# Patient Record
Sex: FEMALE | Race: WHITE | Employment: PART TIME | URBAN - METROPOLITAN AREA
[De-identification: names, ages, dates, MRNs, and addresses within clinical notes are randomized per-mention and may not be internally consistent; named-entity substitution may affect disease eponyms.]

---
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
## 2018-01-03 ENCOUNTER — OFFICE VISIT (OUTPATIENT)
Dept: PERINATAL CARE | Facility: HOSPITAL | Age: 18
End: 2018-01-03
Attending: OBSTETRICS & GYNECOLOGY
Payer: COMMERCIAL

## 2018-01-03 VITALS
WEIGHT: 287 LBS | SYSTOLIC BLOOD PRESSURE: 105 MMHG | HEART RATE: 79 BPM | BODY MASS INDEX: 42 KG/M2 | DIASTOLIC BLOOD PRESSURE: 76 MMHG

## 2018-01-03 DIAGNOSIS — O99.119 THROMBOCYTOPENIA AFFECTING PREGNANCY (HCC): ICD-10-CM

## 2018-01-03 DIAGNOSIS — D69.6 THROMBOCYTOPENIA AFFECTING PREGNANCY (HCC): ICD-10-CM

## 2018-01-03 DIAGNOSIS — Z86.14 HISTORY OF MRSA INFECTION: ICD-10-CM

## 2018-01-03 DIAGNOSIS — E66.9 BMI (BODY MASS INDEX), PEDIATRIC 95-99% FOR AGE, OBESE CHILD STRUCTURED WEIGHT MANAGEMENT/MULTIDISCIPLINARY INTERVENTION CATEGORY: ICD-10-CM

## 2018-01-03 DIAGNOSIS — O09.891 MEDICATION EXPOSURE DURING FIRST TRIMESTER OF PREGNANCY: ICD-10-CM

## 2018-01-03 PROCEDURE — 99214 OFFICE O/P EST MOD 30 MIN: CPT | Performed by: OBSTETRICS & GYNECOLOGY

## 2018-01-03 PROCEDURE — 76805 OB US >/= 14 WKS SNGL FETUS: CPT | Performed by: OBSTETRICS & GYNECOLOGY

## 2018-01-03 NOTE — PROGRESS NOTES
Indication: follow-up for fetal growth.   ____________________________________________________________________________  History: Age: 21 years. : 1 Para: 0.  Last menstrual period: 2017.  ______________________________________________________ range of 150,000 to 450,000/microL in women during normal pregnancies. Idiopathic thrombocytopenia(ITP) is the more likely diagnosis if thrombocytopenia occurs early during pregnancy or if the platelet count is very low (ie, <50,000/microL).      Thromboc single daily dose. Most adults with ITP respond to prednisone treatment within two weeks, with the majority responding within the first week. The duration of initial prednisone treatment is determined by the platelet count response.  If the platelet count r series suggests that there is an approximately 10 and 5 percent risk that the  infant will have a platelet count of <52,890 or <20,000/microL, respectively.      It is important to emphasize that platelet counts of infants born to mothers with ITP ma

## 2018-01-29 NOTE — PROGRESS NOTES
Car Ortega    Dear Dr. Isi Lovell    Thank you for requesting ultrasound evaluation and maternal fetal medicine consultation on your patient Angella Cochran.   As you are aware she is a 21year old female  with a Anatomy:  Visualized with normal appearance: head, chest, kidneys, bladder. Spine: appears normal but suboptimal  Heart: Visualized and normal appearance: four-chamber view. Gastrointestinal Tract: stomach visible.     Summary of Ultrasound Findings:  I

## 2018-01-30 ENCOUNTER — OFFICE VISIT (OUTPATIENT)
Dept: PERINATAL CARE | Facility: HOSPITAL | Age: 18
End: 2018-01-30
Attending: OBSTETRICS & GYNECOLOGY
Payer: COMMERCIAL

## 2018-01-30 VITALS
HEART RATE: 94 BPM | WEIGHT: 281 LBS | BODY MASS INDEX: 42 KG/M2 | SYSTOLIC BLOOD PRESSURE: 116 MMHG | DIASTOLIC BLOOD PRESSURE: 66 MMHG

## 2018-01-30 DIAGNOSIS — O99.213 OBESITY AFFECTING PREGNANCY IN THIRD TRIMESTER: ICD-10-CM

## 2018-01-30 DIAGNOSIS — O99.119 THROMBOCYTOPENIA AFFECTING PREGNANCY (HCC): ICD-10-CM

## 2018-01-30 DIAGNOSIS — D69.6 THROMBOCYTOPENIA AFFECTING PREGNANCY (HCC): ICD-10-CM

## 2018-01-30 PROCEDURE — 76819 FETAL BIOPHYS PROFIL W/O NST: CPT | Performed by: OBSTETRICS & GYNECOLOGY

## 2018-01-30 PROCEDURE — 99214 OFFICE O/P EST MOD 30 MIN: CPT | Performed by: OBSTETRICS & GYNECOLOGY

## 2018-01-30 PROCEDURE — 76805 OB US >/= 14 WKS SNGL FETUS: CPT | Performed by: OBSTETRICS & GYNECOLOGY

## 2018-02-05 ENCOUNTER — HOSPITAL ENCOUNTER (OUTPATIENT)
Facility: HOSPITAL | Age: 18
Setting detail: OBSERVATION
Discharge: HOME | End: 2018-02-05
Attending: OBSTETRICS & GYNECOLOGY | Admitting: OBSTETRICS & GYNECOLOGY
Payer: COMMERCIAL

## 2018-02-05 VITALS
HEART RATE: 88 BPM | TEMPERATURE: 98 F | RESPIRATION RATE: 20 BRPM | DIASTOLIC BLOOD PRESSURE: 77 MMHG | SYSTOLIC BLOOD PRESSURE: 136 MMHG

## 2018-02-05 LAB
BLOOD URINE: NEGATIVE
CONTROL RUN WITHIN 24 HOURS?: YES
GLUCOSE URINE: NEGATIVE
NITRITE URINE: NEGATIVE
PH URINE: 7 (ref 5–8)
PROTEIN URINE: 100
SPEC GRAVITY: 1.03 (ref 1–1.03)
URINE CLARITY: CLEAR
UROBILINOGEN URINE: 8

## 2018-02-05 PROCEDURE — 59025 FETAL NON-STRESS TEST: CPT

## 2018-02-05 PROCEDURE — 81002 URINALYSIS NONAUTO W/O SCOPE: CPT

## 2018-02-05 PROCEDURE — 84112 EVAL AMNIOTIC FLUID PROTEIN: CPT

## 2018-02-05 NOTE — PROGRESS NOTES
Pt is a 21year old female admitted to [facility]. Patient presents with:  Rupture Of Membranes     Pt is  35w0d intra-uterine pregnancy. History obtained, consents signed. Oriented to room, staff, and plan of care.     Pt states that approx 0930

## 2018-02-06 NOTE — NST
Nonstress Test   Patient: Clotilde Ribeiro    Gestation: 35w0d    NST:       Variability: Moderate           Accelerations: Yes           Decelerations: None            Baseline: 140 BPM           Uterine Irritability: No           Contractions: Not pr

## 2018-02-14 PROCEDURE — 87653 STREP B DNA AMP PROBE: CPT | Performed by: OBSTETRICS & GYNECOLOGY

## 2018-02-14 PROCEDURE — 87081 CULTURE SCREEN ONLY: CPT | Performed by: OBSTETRICS & GYNECOLOGY

## 2018-02-27 ENCOUNTER — HOSPITAL ENCOUNTER (INPATIENT)
Facility: HOSPITAL | Age: 18
LOS: 4 days | Discharge: HOME | End: 2018-03-03
Attending: OBSTETRICS & GYNECOLOGY | Admitting: OBSTETRICS & GYNECOLOGY
Payer: COMMERCIAL

## 2018-02-27 LAB
ANTIBODY SCREEN: NEGATIVE
BILIRUBIN URINE: NEGATIVE
BLOOD URINE: NEGATIVE
CONTROL RUN WITHIN 24 HOURS?: YES
ERYTHROCYTE [DISTWIDTH] IN BLOOD BY AUTOMATED COUNT: 12.8 % (ref 11.5–16)
GLUCOSE URINE: NEGATIVE
HCT VFR BLD AUTO: 37.8 % (ref 34–50)
HGB BLD-MCNC: 13.1 G/DL (ref 12–16)
LEUKOCYTE ESTERASE URINE: NEGATIVE
MCH RBC QN AUTO: 31.8 PG (ref 27–33.2)
MCHC RBC AUTO-ENTMCNC: 34.7 G/DL (ref 31–37)
MCV RBC AUTO: 91.7 FL (ref 81–100)
NITRITE URINE: NEGATIVE
PH URINE: 7 (ref 5–8)
PLATELET # BLD AUTO: 119 10(3)UL (ref 150–450)
PROTEIN URINE: NEGATIVE
RBC # BLD AUTO: 4.12 X10(6)UL (ref 3.8–5.1)
RED CELL DISTRIBUTION WIDTH-SD: 42.2 FL (ref 35.1–46.3)
RH BLOOD TYPE: POSITIVE
SPEC GRAVITY: 1.02 (ref 1–1.03)
T PALLIDUM AB SER QL IA: NONREACTIVE
URINE CLARITY: CLEAR
URINE COLOR: YELLOW
UROBILINOGEN URINE: 0.2
WBC # BLD AUTO: 9.5 X10(3) UL (ref 4–13)

## 2018-02-27 PROCEDURE — 84112 EVAL AMNIOTIC FLUID PROTEIN: CPT

## 2018-02-27 PROCEDURE — 86901 BLOOD TYPING SEROLOGIC RH(D): CPT | Performed by: OBSTETRICS & GYNECOLOGY

## 2018-02-27 PROCEDURE — 86850 RBC ANTIBODY SCREEN: CPT | Performed by: OBSTETRICS & GYNECOLOGY

## 2018-02-27 PROCEDURE — 86780 TREPONEMA PALLIDUM: CPT | Performed by: OBSTETRICS & GYNECOLOGY

## 2018-02-27 PROCEDURE — 81002 URINALYSIS NONAUTO W/O SCOPE: CPT

## 2018-02-27 PROCEDURE — 85027 COMPLETE CBC AUTOMATED: CPT | Performed by: OBSTETRICS & GYNECOLOGY

## 2018-02-27 PROCEDURE — 86900 BLOOD TYPING SEROLOGIC ABO: CPT | Performed by: OBSTETRICS & GYNECOLOGY

## 2018-02-27 RX ORDER — DEXTROSE, SODIUM CHLORIDE, SODIUM LACTATE, POTASSIUM CHLORIDE, AND CALCIUM CHLORIDE 5; .6; .31; .03; .02 G/100ML; G/100ML; G/100ML; G/100ML; G/100ML
INJECTION, SOLUTION INTRAVENOUS AS NEEDED
Status: DISCONTINUED | OUTPATIENT
Start: 2018-02-27 | End: 2018-02-28 | Stop reason: HOSPADM

## 2018-02-27 RX ORDER — IBUPROFEN 600 MG/1
600 TABLET ORAL ONCE AS NEEDED
Status: DISCONTINUED | OUTPATIENT
Start: 2018-02-27 | End: 2018-02-28 | Stop reason: HOSPADM

## 2018-02-27 RX ORDER — TRISODIUM CITRATE DIHYDRATE AND CITRIC ACID MONOHYDRATE 500; 334 MG/5ML; MG/5ML
30 SOLUTION ORAL AS NEEDED
Status: COMPLETED | OUTPATIENT
Start: 2018-02-27 | End: 2018-02-28

## 2018-02-27 RX ORDER — TERBUTALINE SULFATE 1 MG/ML
0.25 INJECTION, SOLUTION SUBCUTANEOUS AS NEEDED
Status: DISCONTINUED | OUTPATIENT
Start: 2018-02-27 | End: 2018-02-28 | Stop reason: HOSPADM

## 2018-02-27 RX ORDER — SODIUM CHLORIDE, SODIUM LACTATE, POTASSIUM CHLORIDE, CALCIUM CHLORIDE 600; 310; 30; 20 MG/100ML; MG/100ML; MG/100ML; MG/100ML
INJECTION, SOLUTION INTRAVENOUS CONTINUOUS
Status: DISCONTINUED | OUTPATIENT
Start: 2018-02-27 | End: 2018-02-28 | Stop reason: HOSPADM

## 2018-02-28 ENCOUNTER — ANESTHESIA (OUTPATIENT)
Dept: OBGYN UNIT | Facility: HOSPITAL | Age: 18
End: 2018-02-28
Payer: COMMERCIAL

## 2018-02-28 ENCOUNTER — ANESTHESIA EVENT (OUTPATIENT)
Dept: OBGYN UNIT | Facility: HOSPITAL | Age: 18
End: 2018-02-28
Payer: COMMERCIAL

## 2018-02-28 ENCOUNTER — SURGERY (OUTPATIENT)
Age: 18
End: 2018-02-28

## 2018-02-28 RX ORDER — METHYLERGONOVINE MALEATE 0.2 MG/ML
INJECTION INTRAVENOUS
Status: DISCONTINUED
Start: 2018-02-28 | End: 2018-02-28 | Stop reason: WASHOUT

## 2018-02-28 RX ORDER — CARBOPROST TROMETHAMINE 250 UG/ML
INJECTION, SOLUTION INTRAMUSCULAR
Status: DISCONTINUED
Start: 2018-02-28 | End: 2018-02-28 | Stop reason: WASHOUT

## 2018-02-28 RX ORDER — ONDANSETRON 2 MG/ML
4 INJECTION INTRAMUSCULAR; INTRAVENOUS EVERY 6 HOURS PRN
Status: DISCONTINUED | OUTPATIENT
Start: 2018-02-28 | End: 2018-03-01 | Stop reason: HOSPADM

## 2018-02-28 RX ORDER — EPHEDRINE SULFATE 50 MG/ML
5 INJECTION, SOLUTION INTRAVENOUS AS NEEDED
Status: DISCONTINUED | OUTPATIENT
Start: 2018-02-28 | End: 2018-03-01 | Stop reason: HOSPADM

## 2018-02-28 RX ORDER — DIPHENHYDRAMINE HYDROCHLORIDE 50 MG/ML
25 INJECTION INTRAMUSCULAR; INTRAVENOUS ONCE AS NEEDED
Status: DISCONTINUED | OUTPATIENT
Start: 2018-02-28 | End: 2018-02-28 | Stop reason: HOSPADM

## 2018-02-28 RX ORDER — NALBUPHINE HCL 10 MG/ML
2.5 AMPUL (ML) INJECTION
Status: DISCONTINUED | OUTPATIENT
Start: 2018-02-28 | End: 2018-02-28 | Stop reason: HOSPADM

## 2018-02-28 RX ORDER — NALBUPHINE HCL 10 MG/ML
2.5 AMPUL (ML) INJECTION
Status: DISCONTINUED | OUTPATIENT
Start: 2018-02-28 | End: 2018-03-03

## 2018-02-28 RX ORDER — KETOROLAC TROMETHAMINE 30 MG/ML
30 INJECTION, SOLUTION INTRAMUSCULAR; INTRAVENOUS ONCE AS NEEDED
Status: COMPLETED | OUTPATIENT
Start: 2018-02-28 | End: 2018-02-28

## 2018-02-28 RX ORDER — ONDANSETRON 2 MG/ML
4 INJECTION INTRAMUSCULAR; INTRAVENOUS ONCE AS NEEDED
Status: DISCONTINUED | OUTPATIENT
Start: 2018-02-28 | End: 2018-02-28 | Stop reason: HOSPADM

## 2018-02-28 RX ORDER — KETOROLAC TROMETHAMINE 30 MG/ML
INJECTION, SOLUTION INTRAMUSCULAR; INTRAVENOUS
Status: DISPENSED
Start: 2018-02-28 | End: 2018-03-01

## 2018-02-28 NOTE — PROGRESS NOTES
S: pt states she is feeling contractions but not severe  O:  Temp:  [97.6 °F (36.4 °C)-98.9 °F (37.2 °C)] 97.8 °F (36.6 °C)  Pulse:  [] 77  Resp:  [18-22] 18  BP: (101-119)/(56-84) 116/84   SVE- 3/80%/-2 IUPC placed  FHT-140, mod aby, +accells, no de

## 2018-02-28 NOTE — PROGRESS NOTES
Report on pt received from [address] [place] [address], pt care taken over at this time.

## 2018-02-28 NOTE — PLAN OF CARE
Problem: Patient/Family Goals  Goal: Patient/Family Long Term Goal  Patient's Long Term Goal: UNCOMPLICATED VAGINAL DELIVERY    Interventions:  -   - See additional Care Plan goals for specific interventions   Outcome: Progressing    Goal: Patient/Family S

## 2018-02-28 NOTE — PROGRESS NOTES
[facility]    Labor Progress Note    Naheed Figueroa Patient Status:  Inpatient    2000 MRN [id number]   Location [facility] Attending Maranda Good MD   Hosp Day # 1 PCP Rohit Massey MD       Subjective

## 2018-02-28 NOTE — H&P
[address] and Delivery Prenatal History and Physical Interval Addendum  Please see full Prenatal Record for this pregnancy      SUBJECTIVE:    Interval History:      This is a pregnancy at 38 weeks admitted for SROM at 2:30 PM today  Pregnancy

## 2018-02-28 NOTE — PROGRESS NOTES
WITH EDC 2018 ( 38 1/7 WEEKS) PRESENTS TO L&D WITH C/O SROM  PM. PT. DENIES PROBLEMS WITH PREGNANCY, GROSS VAGINAL BLEEDING OR REGULAR CTX'S. EFM AND TOCO PLACED. FHT'S REACTIVE. CTX'S IRREGULAR.  WILL UPDATE DR. Libby Rojo OF THE ABOVE ASSESSM

## 2018-03-01 LAB
BASOPHILS # BLD AUTO: 0.01 X10(3) UL (ref 0–0.1)
BASOPHILS NFR BLD AUTO: 0.1 %
EOSINOPHIL # BLD AUTO: 0.01 X10(3) UL (ref 0–0.3)
EOSINOPHIL NFR BLD AUTO: 0.1 %
ERYTHROCYTE [DISTWIDTH] IN BLOOD BY AUTOMATED COUNT: 12.8 % (ref 11.5–16)
HCT VFR BLD AUTO: 29.3 % (ref 34–50)
HGB BLD-MCNC: 10 G/DL (ref 12–16)
IMMATURE GRANULOCYTE COUNT: 0.05 X10(3) UL (ref 0–1)
IMMATURE GRANULOCYTE RATIO %: 0.4 %
LYMPHOCYTES # BLD AUTO: 1.34 X10(3) UL (ref 0.9–4)
LYMPHOCYTES NFR BLD AUTO: 11.7 %
MCH RBC QN AUTO: 31.3 PG (ref 27–33.2)
MCHC RBC AUTO-ENTMCNC: 34.1 G/DL (ref 31–37)
MCV RBC AUTO: 91.6 FL (ref 81–100)
MONOCYTES # BLD AUTO: 1.07 X10(3) UL (ref 0.1–1)
MONOCYTES NFR BLD AUTO: 9.4 %
NEUTROPHIL ABS PRELIM: 8.93 X10 (3) UL (ref 1.3–6.7)
NEUTROPHILS # BLD AUTO: 8.93 X10(3) UL (ref 1.3–6.7)
NEUTROPHILS NFR BLD AUTO: 78.3 %
PLATELET # BLD AUTO: 103 10(3)UL (ref 150–450)
RBC # BLD AUTO: 3.2 X10(6)UL (ref 3.8–5.1)
RED CELL DISTRIBUTION WIDTH-SD: 42.2 FL (ref 35.1–46.3)
WBC # BLD AUTO: 11.4 X10(3) UL (ref 4–13)

## 2018-03-01 PROCEDURE — 85025 COMPLETE CBC W/AUTO DIFF WBC: CPT | Performed by: OBSTETRICS & GYNECOLOGY

## 2018-03-01 RX ORDER — DEXTROSE, SODIUM CHLORIDE, SODIUM LACTATE, POTASSIUM CHLORIDE, AND CALCIUM CHLORIDE 5; .6; .31; .03; .02 G/100ML; G/100ML; G/100ML; G/100ML; G/100ML
INJECTION, SOLUTION INTRAVENOUS CONTINUOUS
Status: DISCONTINUED | OUTPATIENT
Start: 2018-03-01 | End: 2018-03-03

## 2018-03-01 RX ORDER — DOCUSATE SODIUM 100 MG/1
100 CAPSULE, LIQUID FILLED ORAL
Status: DISCONTINUED | OUTPATIENT
Start: 2018-03-01 | End: 2018-03-03

## 2018-03-01 RX ORDER — BISACODYL 10 MG
10 SUPPOSITORY, RECTAL RECTAL
Status: DISCONTINUED | OUTPATIENT
Start: 2018-03-01 | End: 2018-03-03

## 2018-03-01 RX ORDER — ONDANSETRON 2 MG/ML
4 INJECTION INTRAMUSCULAR; INTRAVENOUS EVERY 6 HOURS PRN
Status: DISCONTINUED | OUTPATIENT
Start: 2018-03-01 | End: 2018-03-03

## 2018-03-01 RX ORDER — SIMETHICONE 80 MG
80 TABLET,CHEWABLE ORAL 3 TIMES DAILY PRN
Status: DISCONTINUED | OUTPATIENT
Start: 2018-03-01 | End: 2018-03-03

## 2018-03-01 RX ORDER — KETOROLAC TROMETHAMINE 30 MG/ML
30 INJECTION, SOLUTION INTRAMUSCULAR; INTRAVENOUS EVERY 6 HOURS PRN
Status: DISPENSED | OUTPATIENT
Start: 2018-03-01 | End: 2018-03-03

## 2018-03-01 RX ORDER — IBUPROFEN 600 MG/1
600 TABLET ORAL EVERY 6 HOURS SCHEDULED
Status: DISCONTINUED | OUTPATIENT
Start: 2018-03-01 | End: 2018-03-03

## 2018-03-01 RX ORDER — DIPHENHYDRAMINE HYDROCHLORIDE 50 MG/ML
12.5 INJECTION INTRAMUSCULAR; INTRAVENOUS EVERY 4 HOURS PRN
Status: DISCONTINUED | OUTPATIENT
Start: 2018-03-01 | End: 2018-03-03

## 2018-03-01 RX ORDER — METOCLOPRAMIDE HYDROCHLORIDE 5 MG/ML
10 INJECTION INTRAMUSCULAR; INTRAVENOUS EVERY 6 HOURS PRN
Status: DISCONTINUED | OUTPATIENT
Start: 2018-03-01 | End: 2018-03-03

## 2018-03-01 RX ORDER — DIPHENHYDRAMINE HCL 25 MG
25 CAPSULE ORAL EVERY 4 HOURS PRN
Status: DISCONTINUED | OUTPATIENT
Start: 2018-03-01 | End: 2018-03-03

## 2018-03-01 RX ORDER — HYDROCODONE BITARTRATE AND ACETAMINOPHEN 10; 325 MG/1; MG/1
1 TABLET ORAL EVERY 4 HOURS PRN
Status: DISCONTINUED | OUTPATIENT
Start: 2018-03-01 | End: 2018-03-03

## 2018-03-01 RX ORDER — SODIUM CHLORIDE, SODIUM LACTATE, POTASSIUM CHLORIDE, CALCIUM CHLORIDE 600; 310; 30; 20 MG/100ML; MG/100ML; MG/100ML; MG/100ML
INJECTION, SOLUTION INTRAVENOUS CONTINUOUS
Status: DISCONTINUED | OUTPATIENT
Start: 2018-03-01 | End: 2018-03-03

## 2018-03-01 RX ORDER — HYDROCODONE BITARTRATE AND ACETAMINOPHEN 5; 325 MG/1; MG/1
1 TABLET ORAL EVERY 4 HOURS PRN
Status: DISCONTINUED | OUTPATIENT
Start: 2018-03-01 | End: 2018-03-03

## 2018-03-01 RX ORDER — NALOXONE HYDROCHLORIDE 0.4 MG/ML
0.08 INJECTION, SOLUTION INTRAMUSCULAR; INTRAVENOUS; SUBCUTANEOUS
Status: ACTIVE | OUTPATIENT
Start: 2018-03-01 | End: 2018-03-02

## 2018-03-01 RX ORDER — NALBUPHINE HCL 10 MG/ML
2.5 AMPUL (ML) INJECTION EVERY 4 HOURS PRN
Status: DISCONTINUED | OUTPATIENT
Start: 2018-03-01 | End: 2018-03-03

## 2018-03-01 RX ORDER — ZOLPIDEM TARTRATE 5 MG/1
5 TABLET ORAL NIGHTLY PRN
Status: DISCONTINUED | OUTPATIENT
Start: 2018-03-01 | End: 2018-03-03

## 2018-03-01 NOTE — BRIEF OP NOTE
Pre-Operative Diagnosis: term pregnancy, arrest of dilation     Post-Operative Diagnosis:same + persistent occiput posterior     Procedure Performed: primary low transverse  section  Procedure(s):      Surgeon(s) and Role:     Rajani Villarreal,

## 2018-03-01 NOTE — PROGRESS NOTES
Patient transferred to mother/baby room 2208 per cart in stable condition with baby and personal belongings. Accompanied by family member and staff. Report given to mother/baby RN.

## 2018-03-01 NOTE — ANESTHESIA POSTPROCEDURE EVALUATION
[address]  Patient Status:  Inpatient   Age/Gender 21year old female MRN [id number]   Location [facility] Attending Herrera Clark MD   Hosp Day # 1 PCP Unknown MD Harjinder       Anesthesia Post-op No

## 2018-03-01 NOTE — CM/SW NOTE
CM received order that 21 yr old pt would like infant added to medicaid. CM called [facility] and ask that they follow up with pt to do medicaid for infant. CM will follow up with pt to seeif she has [facility] resources and PCP for infant.

## 2018-03-01 NOTE — CM/SW NOTE
CM met with pt and reviewed insurance and PCP for infant. [facility] met with pt and infant will be added to medicaid. PCP will be Dr. Anton Ambrose. Pt has [facility] and already has breast pump. CM asked if pt had any other needs? Pt stated no.  CM left name and

## 2018-03-01 NOTE — ANESTHESIA PREPROCEDURE EVALUATION
PRE-OP EVALUATION    Patient Name: Katty Adams    Pre-op Diagnosis: * No pre-op diagnosis entered *    Procedure(s):      Surgeon(s) and Role:     * Simeon Reid, MD - Primary    Pre-op vitals reviewed.   Temp: 98.5 °F (36.9 °C)  Pulse: 74  Resp: Neuro/Psych        (+) anxiety                            Past Surgical History:  No date: OTHER SURGICAL HISTORY      Comment: myringtomy tubes  No date: OTHER SURGICAL HISTORY      Comment: flat feet  No date: OTHER SURGICAL HISTORY

## 2018-03-01 NOTE — PROGRESS NOTES
Postop Day 1    Pt without complaints. Pain well-controlled. Passing flatus. No NV.     Temp:  [97.8 °F (36.6 °C)-98.6 °F (37 °C)] 98.1 °F (36.7 °C)  Pulse:  [] 82  Resp:  [11-36] 20  BP: ()/() 135/74    Intake/Output Summary (Last 24 hour

## 2018-03-01 NOTE — PROGRESS NOTES
[facility]    Patients Name: Yuval Collado  Attending Physician: Kassidy Borges MD  CSN: [id number]    Location:  5728/1701-J  MRN: [id number]    YOB: 2000  Admission Date: 2/27/2018     Obstetric Anesthesia Pain Management Progrbaudilio

## 2018-03-01 NOTE — PROGRESS NOTES
SUBJECTIVE:   pt without complaints. Comfortable s/p epidural    OBJECTIVE:  VS:  height is 5' 9\" (1.753 m) and weight is 280 lb (127 kg). Her oral temperature is 98.5 °F (36.9 °C). Her blood pressure is 114/72 and her pulse is 74.  Her respiration is 20

## 2018-03-01 NOTE — OPERATIVE REPORT
[facility]    PATIENT'S NAME: Gregory Saucedo   ATTENDING PHYSICIAN: Brady Velasco M.D. OPERATING PHYSICIAN: Jennifer Powell M.D.    PATIENT ACCOUNT#:   [de-identified]    LOCATION:  [address] [place] [address]  MEDICAL RECORD #:   [id number]       DATE OF BIRTH: Metzenbaum scissors and extended bilaterally to create the bladder flap. The uterus was partway incised in the midline with a scalpel and entered fully with the tips of a Shabana clamp and extended bilaterally with cephalocaudal manual traction.   The infant

## 2018-03-02 NOTE — RESPIRATORY THERAPY NOTE
Talked with patient about prior use of CPAP. Patient stated she has not used it in a few years due to \"turning a lot\" while sleeping. Patient advised that hospital will provide CPAP unit while staying here should she change her mind.  Will monitor overnig

## 2018-03-02 NOTE — PROGRESS NOTES
Postop Day 2    Pt without complaints. Has history of sleep apnea but not using CPAP per pt preference. Pt has declined sleep apnea workup because she prefers to not be bothered with using the CPAP.   Currently on telemetry to monitor O2sat, which has bee

## 2018-03-03 VITALS
RESPIRATION RATE: 18 BRPM | DIASTOLIC BLOOD PRESSURE: 83 MMHG | BODY MASS INDEX: 41.47 KG/M2 | SYSTOLIC BLOOD PRESSURE: 102 MMHG | HEART RATE: 99 BPM | HEIGHT: 69 IN | TEMPERATURE: 98 F | WEIGHT: 280 LBS | OXYGEN SATURATION: 99 %

## 2018-03-03 RX ORDER — HYDROCODONE BITARTRATE AND ACETAMINOPHEN 5; 325 MG/1; MG/1
1 TABLET ORAL EVERY 4 HOURS PRN
Qty: 15 TABLET | Refills: 0 | Status: SHIPPED | OUTPATIENT
Start: 2018-03-03 | End: 2018-03-19 | Stop reason: ALTCHOICE

## 2018-03-03 NOTE — PROGRESS NOTES
Postop Day 3    Pt without complaints.      Temp:  [98.1 °F (36.7 °C)] 98.1 °F (36.7 °C)  Pulse:  [] 99  Resp:  [18] 18  BP: (100-105)/(62-83) 102/83  abd  soft, NT, ND, fundus firm below umbilicus, incision C/D/I  extr  trace edema, no calf tendernes

## 2018-03-03 NOTE — DISCHARGE SUMMARY
Date of admission:  2018  4:50 PM  Date of discharge:  3/3/2018  Admit diagnosis:  38w2d     SROM      Arrest of dilation  Discharge diagnosis: Same     Status post  section  Hospital course:     Marquez Easton is a 21year old  a

## 2018-03-03 NOTE — PROGRESS NOTES
Instructions completed with no further questions. ID number on mom/baby tag checked for match. Discharged with baby in [facility].

## 2018-03-06 ENCOUNTER — APPOINTMENT (OUTPATIENT)
Dept: ULTRASOUND IMAGING | Facility: HOSPITAL | Age: 18
End: 2018-03-06
Payer: COMMERCIAL

## 2018-03-06 ENCOUNTER — HOSPITAL ENCOUNTER (EMERGENCY)
Facility: HOSPITAL | Age: 18
Discharge: HOME | End: 2018-03-07
Payer: COMMERCIAL

## 2018-03-06 ENCOUNTER — TELEPHONE (OUTPATIENT)
Dept: OBGYN UNIT | Facility: HOSPITAL | Age: 18
End: 2018-03-06

## 2018-03-06 DIAGNOSIS — R60.9 DEPENDENT EDEMA: Primary | ICD-10-CM

## 2018-03-06 PROCEDURE — 99284 EMERGENCY DEPT VISIT MOD MDM: CPT

## 2018-03-06 PROCEDURE — 93971 EXTREMITY STUDY: CPT

## 2018-03-06 PROCEDURE — 36415 COLL VENOUS BLD VENIPUNCTURE: CPT

## 2018-03-06 NOTE — PROGRESS NOTES
Reviewed self and infant care w / mom, she verbalizes understanding of instructions reviewed. Encourage to follow up w/ MDs as directed and w/ questions/concerns. Lives w her mom for support, FOB not involved. Enc to go to ct and call lac prn.

## 2018-03-07 VITALS
HEART RATE: 92 BPM | DIASTOLIC BLOOD PRESSURE: 70 MMHG | OXYGEN SATURATION: 98 % | BODY MASS INDEX: 39.99 KG/M2 | SYSTOLIC BLOOD PRESSURE: 109 MMHG | WEIGHT: 270 LBS | TEMPERATURE: 98 F | HEIGHT: 69 IN | RESPIRATION RATE: 20 BRPM

## 2018-03-07 NOTE — ED INITIAL ASSESSMENT (HPI)
Pt to ED c/o bilateral lower leg swelling - right leg more than left leg, noted since 1730 today. Pt denies pain to both legs, only tingling sensation. Pt denies shortness of breath. Pt had  on 2018.
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
## 2018-03-07 NOTE — ED PROVIDER NOTES
Patient Seen in: [facility] Emergency Department    History   Patient presents with:  Postop/Procedure Problem  Swelling Edema (cardiovascular, metabolic)    Stated Complaint: had , had numbness to legs and now having swelling to RLE    HPI Never Smoker                                                              Smokeless tobacco: Never Used                      Alcohol use:  No                Review of Systems    Positive for stated complaint: had , had numbness to legs and now havi 3/6/2018  PROCEDURE:  US VENOUS DOPPLER LEG RIGHT - DIAG IMG (CPT=93971)  COMPARISON:  None. INDICATIONS:  eval for DVT  TECHNIQUE:  Real time, grey scale, and duplex ultrasound was used to evaluate the lower extremity venous system.  B-mode two-dimensiona return to the emergency department. Reasonable over the counter and prescription treatment options and Physician follow up plan was discussed. The patient is discharged home in good condition.              Disposition and Plan     Clinical Impression:

## 2018-03-09 PROBLEM — O21.0 HYPEREMESIS GRAVIDARUM: Status: RESOLVED | Noted: 2017-08-17 | Resolved: 2018-03-09

## 2018-03-09 PROBLEM — O99.119 THROMBOCYTOPENIA AFFECTING PREGNANCY (HCC): Status: RESOLVED | Noted: 2017-12-19 | Resolved: 2018-03-09

## 2018-03-09 PROBLEM — D69.6 THROMBOCYTOPENIA AFFECTING PREGNANCY (HCC): Status: RESOLVED | Noted: 2017-12-19 | Resolved: 2018-03-09

## 2018-03-09 PROBLEM — O21.0 HYPEREMESIS GRAVIDARUM (HCC): Status: RESOLVED | Noted: 2017-08-17 | Resolved: 2018-03-09

## 2018-03-14 ENCOUNTER — NURSE ONLY (OUTPATIENT)
Dept: LACTATION | Facility: HOSPITAL | Age: 18
End: 2018-03-14
Attending: OBSTETRICS & GYNECOLOGY
Payer: COMMERCIAL

## 2018-03-14 VITALS — TEMPERATURE: 98 F

## 2018-03-14 DIAGNOSIS — O92.5 SUPPRESSED LACTATION, POSTPARTUM CONDITION OR COMPLICATION: Primary | ICD-10-CM

## 2018-03-14 PROCEDURE — 99213 OFFICE O/P EST LOW 20 MIN: CPT

## 2018-03-14 NOTE — PROGRESS NOTES
LACTATION NOTE - MOTHER      Evaluation Type: Outpatient Initial    Problems identified  Problems identified: Knowledge deficit; Flat nipple(s); Unable to acheive sustained latch;Milk supply not WNL  Problems Identified Other:  (Baby is refusing breast, moth

## 2018-03-29 ENCOUNTER — APPOINTMENT (OUTPATIENT)
Dept: LACTATION | Facility: HOSPITAL | Age: 18
End: 2018-03-29
Attending: OBSTETRICS & GYNECOLOGY
Payer: COMMERCIAL

## 2018-06-14 PROBLEM — Z51.81 ENCOUNTER FOR THERAPEUTIC DRUG MONITORING: Status: ACTIVE | Noted: 2018-06-14

## 2018-06-14 PROBLEM — E66.01 MORBID OBESITY WITH BMI OF 40.0-44.9, ADULT (HCC): Status: ACTIVE | Noted: 2018-06-14

## 2018-07-17 PROCEDURE — 82397 CHEMILUMINESCENT ASSAY: CPT | Performed by: NURSE PRACTITIONER

## 2018-07-17 PROCEDURE — 82607 VITAMIN B-12: CPT | Performed by: NURSE PRACTITIONER

## 2018-11-27 ENCOUNTER — HOSPITAL ENCOUNTER (EMERGENCY)
Age: 18
Discharge: HOME | End: 2018-11-27
Attending: EMERGENCY MEDICINE
Payer: OTHER MISCELLANEOUS

## 2018-11-27 VITALS
RESPIRATION RATE: 14 BRPM | SYSTOLIC BLOOD PRESSURE: 119 MMHG | TEMPERATURE: 98 F | WEIGHT: 220 LBS | DIASTOLIC BLOOD PRESSURE: 87 MMHG | HEIGHT: 69 IN | OXYGEN SATURATION: 97 % | BODY MASS INDEX: 32.58 KG/M2 | HEART RATE: 92 BPM

## 2018-11-27 DIAGNOSIS — S91.312A LACERATION OF LEFT FOOT, INITIAL ENCOUNTER: Primary | ICD-10-CM

## 2018-11-27 PROCEDURE — 99283 EMERGENCY DEPT VISIT LOW MDM: CPT | Performed by: EMERGENCY MEDICINE

## 2018-11-27 PROCEDURE — 90471 IMMUNIZATION ADMIN: CPT | Performed by: EMERGENCY MEDICINE

## 2018-11-28 NOTE — ED PROVIDER NOTES
Patient Seen in: THE [facility] Emergency Department In [place]    History   Patient presents with:  Laceration Abrasion (integumentary)    Stated Complaint: Lt foot laceration    HPI    Patient presents to the emergency department with a laceration to the he and family members are at bedside with her son.   Her HEENT exam is within normal limits her focus physical exam centers on her left foot were on the heel of her left foot she has a well adhered semicircular laceration it is not actively bleeding it is sienna

## 2019-05-02 ENCOUNTER — HOSPITAL ENCOUNTER (EMERGENCY)
Age: 19
Discharge: STILL ADMITTED OR ADMINISTRATIVE | End: 2019-05-02
Payer: COMMERCIAL

## 2019-07-08 PROBLEM — Z34.90 PREGNANCY: Status: RESOLVED | Noted: 2017-10-25 | Resolved: 2019-07-08

## 2019-07-08 PROBLEM — O09.891 MEDICATION EXPOSURE DURING FIRST TRIMESTER OF PREGNANCY: Status: RESOLVED | Noted: 2017-08-20 | Resolved: 2019-07-08

## 2019-07-08 PROBLEM — O99.213 OBESITY AFFECTING PREGNANCY IN THIRD TRIMESTER (HCC): Status: RESOLVED | Noted: 2018-01-30 | Resolved: 2019-07-08

## 2019-07-08 PROBLEM — O99.213 OBESITY AFFECTING PREGNANCY IN THIRD TRIMESTER: Status: RESOLVED | Noted: 2018-01-30 | Resolved: 2019-07-08

## 2019-07-08 PROBLEM — O09.891 MEDICATION EXPOSURE DURING FIRST TRIMESTER OF PREGNANCY (HCC): Status: RESOLVED | Noted: 2017-08-20 | Resolved: 2019-07-08

## 2019-07-08 PROBLEM — Z34.90 PREGNANCY (HCC): Status: RESOLVED | Noted: 2017-10-25 | Resolved: 2019-07-08

## 2019-07-19 PROCEDURE — 87389 HIV-1 AG W/HIV-1&-2 AB AG IA: CPT | Performed by: OBSTETRICS & GYNECOLOGY

## 2019-07-19 PROCEDURE — 86803 HEPATITIS C AB TEST: CPT | Performed by: OBSTETRICS & GYNECOLOGY

## 2020-10-04 ENCOUNTER — APPOINTMENT (OUTPATIENT)
Dept: GENERAL RADIOLOGY | Age: 20
End: 2020-10-04
Attending: EMERGENCY MEDICINE
Payer: COMMERCIAL

## 2020-10-04 ENCOUNTER — APPOINTMENT (OUTPATIENT)
Dept: CT IMAGING | Age: 20
End: 2020-10-04
Attending: EMERGENCY MEDICINE
Payer: COMMERCIAL

## 2020-10-04 ENCOUNTER — HOSPITAL ENCOUNTER (EMERGENCY)
Age: 20
Discharge: HOME | End: 2020-10-04
Attending: EMERGENCY MEDICINE
Payer: COMMERCIAL

## 2020-10-04 VITALS
DIASTOLIC BLOOD PRESSURE: 55 MMHG | SYSTOLIC BLOOD PRESSURE: 94 MMHG | WEIGHT: 220 LBS | RESPIRATION RATE: 16 BRPM | BODY MASS INDEX: 32 KG/M2 | OXYGEN SATURATION: 99 % | HEART RATE: 60 BPM

## 2020-10-04 DIAGNOSIS — R07.89 CHEST WALL PAIN: Primary | ICD-10-CM

## 2020-10-04 PROCEDURE — 71045 X-RAY EXAM CHEST 1 VIEW: CPT | Performed by: EMERGENCY MEDICINE

## 2020-10-04 PROCEDURE — 84484 ASSAY OF TROPONIN QUANT: CPT | Performed by: EMERGENCY MEDICINE

## 2020-10-04 PROCEDURE — 80053 COMPREHEN METABOLIC PANEL: CPT | Performed by: EMERGENCY MEDICINE

## 2020-10-04 PROCEDURE — 93005 ELECTROCARDIOGRAM TRACING: CPT

## 2020-10-04 PROCEDURE — 71260 CT THORAX DX C+: CPT | Performed by: EMERGENCY MEDICINE

## 2020-10-04 PROCEDURE — 96374 THER/PROPH/DIAG INJ IV PUSH: CPT

## 2020-10-04 PROCEDURE — 99285 EMERGENCY DEPT VISIT HI MDM: CPT

## 2020-10-04 PROCEDURE — 85025 COMPLETE CBC W/AUTO DIFF WBC: CPT | Performed by: EMERGENCY MEDICINE

## 2020-10-04 PROCEDURE — 93010 ELECTROCARDIOGRAM REPORT: CPT

## 2020-10-04 PROCEDURE — 85379 FIBRIN DEGRADATION QUANT: CPT | Performed by: EMERGENCY MEDICINE

## 2020-10-04 RX ORDER — KETOROLAC TROMETHAMINE 30 MG/ML
15 INJECTION, SOLUTION INTRAMUSCULAR; INTRAVENOUS ONCE
Status: COMPLETED | OUTPATIENT
Start: 2020-10-04 | End: 2020-10-04

## 2020-11-01 NOTE — ED PROVIDER NOTES
Called [facility] for Dr Dominguez, on the phone with them at this time.      Gio Hernandez  11/01/20 1825     Patient Seen in: [facility] Emergency Department In [place]      History   Patient presents with:  Chest Pain Angina    Stated Complaint: Chest pain x 4 days     HPI    25-year-old female comes to the hospital complaint of having difficulty with chest dis trachea midline, No LAD  Heart: S1S2 normal. No murmurs, regular rate and rhythm, reproducible chest wall tenderness is noted  Lungs: Clear to auscultation bilaterally  Abdomen: Soft nontender nondistended normal active bowel sounds without rebound, guardi Technologist)  Patient states middle chest pain that radiates to back since 10/1/2020. FINDINGS: Cardiac silhouette and pulmonary vasculature are unremarkable. No consolidation, pleural effusion or pneumothorax.  IMPRESSION: Unremarkable portable chest r Finalized by (CST): Brittany Olson MD on 10/04/2020 at 4:42 PM         Medications   ketorolac tromethamine (TORADOL) 30 MG/ML injection 15 mg (15 mg Intravenous Given 10/4/20 1527)     The patient was her department.   Is feeling markedly better after cou

## 2022-11-25 ENCOUNTER — HOSPITAL ENCOUNTER (EMERGENCY)
Age: 22
Discharge: HOME | End: 2022-11-25
Attending: EMERGENCY MEDICINE
Payer: COMMERCIAL

## 2022-11-25 VITALS
TEMPERATURE: 97 F | OXYGEN SATURATION: 97 % | RESPIRATION RATE: 16 BRPM | BODY MASS INDEX: 38.51 KG/M2 | DIASTOLIC BLOOD PRESSURE: 53 MMHG | SYSTOLIC BLOOD PRESSURE: 104 MMHG | HEIGHT: 69 IN | HEART RATE: 56 BPM | WEIGHT: 260 LBS

## 2022-11-25 DIAGNOSIS — R11.2 NAUSEA AND VOMITING IN ADULT: ICD-10-CM

## 2022-11-25 DIAGNOSIS — B34.9 VIRAL SYNDROME: Primary | ICD-10-CM

## 2022-11-25 LAB
ALBUMIN SERPL-MCNC: 3.7 G/DL (ref 3.4–5)
ALBUMIN/GLOB SERPL: 1.1 {RATIO} (ref 1–2)
ALP LIVER SERPL-CCNC: 68 U/L
ALT SERPL-CCNC: 24 U/L
ANION GAP SERPL CALC-SCNC: 7 MMOL/L (ref 0–18)
AST SERPL-CCNC: 17 U/L (ref 15–37)
BASOPHILS # BLD AUTO: 0.01 X10(3) UL (ref 0–0.2)
BASOPHILS NFR BLD AUTO: 0.3 %
BILIRUB SERPL-MCNC: 0.4 MG/DL (ref 0.1–2)
BILIRUB UR QL CFM: NEGATIVE
BUN BLD-MCNC: 6 MG/DL (ref 7–18)
CALCIUM BLD-MCNC: 9 MG/DL (ref 8.5–10.1)
CHLORIDE SERPL-SCNC: 104 MMOL/L (ref 98–112)
CO2 SERPL-SCNC: 29 MMOL/L (ref 21–32)
COLOR UR AUTO: YELLOW
CREAT BLD-MCNC: 0.48 MG/DL
EOSINOPHIL # BLD AUTO: 0.02 X10(3) UL (ref 0–0.7)
EOSINOPHIL NFR BLD AUTO: 0.6 %
ERYTHROCYTE [DISTWIDTH] IN BLOOD BY AUTOMATED COUNT: 12.9 %
GFR SERPLBLD BASED ON 1.73 SQ M-ARVRAT: 136 ML/MIN/1.73M2 (ref 60–?)
GLOBULIN PLAS-MCNC: 3.4 G/DL (ref 2.8–4.4)
GLUCOSE BLD-MCNC: 108 MG/DL (ref 70–99)
GLUCOSE UR STRIP.AUTO-MCNC: NEGATIVE MG/DL
HCT VFR BLD AUTO: 43.6 %
HGB BLD-MCNC: 15.1 G/DL
IMM GRANULOCYTES # BLD AUTO: 0.01 X10(3) UL (ref 0–1)
IMM GRANULOCYTES NFR BLD: 0.3 %
KETONES UR STRIP.AUTO-MCNC: >=160 MG/DL
LYMPHOCYTES # BLD AUTO: 1.21 X10(3) UL (ref 1–4)
LYMPHOCYTES NFR BLD AUTO: 33.7 %
MCH RBC QN AUTO: 30.8 PG (ref 26–34)
MCHC RBC AUTO-ENTMCNC: 34.6 G/DL (ref 31–37)
MCV RBC AUTO: 88.8 FL
MONOCYTES # BLD AUTO: 0.53 X10(3) UL (ref 0.1–1)
MONOCYTES NFR BLD AUTO: 14.8 %
NEUTROPHILS # BLD AUTO: 1.81 X10 (3) UL (ref 1.5–7.7)
NEUTROPHILS # BLD AUTO: 1.81 X10(3) UL (ref 1.5–7.7)
NEUTROPHILS NFR BLD AUTO: 50.3 %
NITRITE UR QL STRIP.AUTO: NEGATIVE
OSMOLALITY SERPL CALC.SUM OF ELEC: 288 MOSM/KG (ref 275–295)
PH UR STRIP.AUTO: 7.5 [PH] (ref 5–8)
PLATELET # BLD AUTO: 87 10(3)UL (ref 150–450)
POTASSIUM SERPL-SCNC: 3.8 MMOL/L (ref 3.5–5.1)
PROT SERPL-MCNC: 7.1 G/DL (ref 6.4–8.2)
RBC # BLD AUTO: 4.91 X10(6)UL
RBC UR QL AUTO: NEGATIVE
SODIUM SERPL-SCNC: 140 MMOL/L (ref 136–145)
SP GR UR STRIP.AUTO: 1.02 (ref 1–1.03)
UROBILINOGEN UR STRIP.AUTO-MCNC: >=8 MG/DL
WBC # BLD AUTO: 3.6 X10(3) UL (ref 4–11)

## 2022-11-25 PROCEDURE — 99284 EMERGENCY DEPT VISIT MOD MDM: CPT

## 2022-11-25 PROCEDURE — 87086 URINE CULTURE/COLONY COUNT: CPT | Performed by: NURSE PRACTITIONER

## 2022-11-25 PROCEDURE — 87186 SC STD MICRODIL/AGAR DIL: CPT | Performed by: NURSE PRACTITIONER

## 2022-11-25 PROCEDURE — 80053 COMPREHEN METABOLIC PANEL: CPT | Performed by: NURSE PRACTITIONER

## 2022-11-25 PROCEDURE — 96375 TX/PRO/DX INJ NEW DRUG ADDON: CPT

## 2022-11-25 PROCEDURE — 87077 CULTURE AEROBIC IDENTIFY: CPT | Performed by: NURSE PRACTITIONER

## 2022-11-25 PROCEDURE — 81001 URINALYSIS AUTO W/SCOPE: CPT | Performed by: NURSE PRACTITIONER

## 2022-11-25 PROCEDURE — 96374 THER/PROPH/DIAG INJ IV PUSH: CPT

## 2022-11-25 PROCEDURE — 85025 COMPLETE CBC W/AUTO DIFF WBC: CPT | Performed by: NURSE PRACTITIONER

## 2022-11-25 PROCEDURE — 81015 MICROSCOPIC EXAM OF URINE: CPT | Performed by: NURSE PRACTITIONER

## 2022-11-25 PROCEDURE — 96361 HYDRATE IV INFUSION ADD-ON: CPT

## 2022-11-25 RX ORDER — METOCLOPRAMIDE HYDROCHLORIDE 5 MG/ML
5 INJECTION INTRAMUSCULAR; INTRAVENOUS ONCE
Status: COMPLETED | OUTPATIENT
Start: 2022-11-25 | End: 2022-11-25

## 2022-11-25 RX ORDER — DIPHENHYDRAMINE HYDROCHLORIDE 50 MG/ML
50 INJECTION INTRAMUSCULAR; INTRAVENOUS ONCE
Status: COMPLETED | OUTPATIENT
Start: 2022-11-25 | End: 2022-11-25

## 2022-11-25 RX ORDER — METOCLOPRAMIDE 10 MG/1
10 TABLET ORAL 3 TIMES DAILY PRN
Qty: 20 TABLET | Refills: 0 | Status: SHIPPED | OUTPATIENT
Start: 2022-11-25 | End: 2022-12-25

## 2022-11-25 NOTE — ED PROVIDER NOTES
Patient diagnosed with influenza yesterday at urgent care. She was having stuffy nose, congestion, fever, chills, body ache, and cough for about 6 days now. She began having nausea and vomiting this last Sunday. No diarrhea. On examination, this alert young woman   eye sclera white  Lungs are clear to auscultation  Abdomen is soft, nondistended, completely nontender, even with fairly deep palpation. Patient treated with IV fluid, Reglan, and Benadryl. Metabolic panel shows normal electrolytes and creatinine. Glucose normal.  LFTs normal  CBC shows mildly low white count likely related to viral illness. Thrombocytopenia also likely related to viral illness and requires close follow-up. Hemoglobin normal.    I recommend rest, fluids, Tylenol for fever/chills/body ache, Reglan around-the-clock for the first day, then as needed, light bland diet, and close follow-up with her primary care provider. I provided a substantive portion of care for this patient. I personally performed the medical decision making for this encounter.

## 2022-11-25 NOTE — ED INITIAL ASSESSMENT (HPI)
Vomiting since Sunday - influenza A+ - states she is 5 weeks preg and cant keep food or liquids down

## 2022-11-25 NOTE — DISCHARGE INSTRUCTIONS
Clear liquid diet for the next 24 hours. Thereafter bland diet until symptoms resolve. My suspicion is that your nausea vomiting is due to the flu. Take the Reglan as needed for nausea. Call your gynecologist as soon as possible for a follow-up visit.   Return for new or worsening symptoms

## 2022-11-27 RX ORDER — CEPHALEXIN 500 MG/1
500 CAPSULE ORAL 2 TIMES DAILY
Qty: 20 CAPSULE | Refills: 0 | Status: SHIPPED | OUTPATIENT
Start: 2022-11-27 | End: 2022-12-07

## 2022-11-27 NOTE — ED NOTES
Left message to call back re culture results and treatment plan - rx needs to be called into patient preferred pharmacy

## 2023-01-03 RX ORDER — PROGESTERONE 200 MG/1
CAPSULE ORAL
COMMUNITY
Start: 2022-11-14 | End: 2023-01-06

## 2023-01-04 ENCOUNTER — LAB ENCOUNTER (OUTPATIENT)
Dept: LAB | Age: 23
End: 2023-01-04
Attending: OBSTETRICS & GYNECOLOGY
Payer: COMMERCIAL

## 2023-01-04 DIAGNOSIS — Z01.818 PRE-OP TESTING: ICD-10-CM

## 2023-01-04 LAB
ANTIBODY SCREEN: NEGATIVE
ERYTHROCYTE [DISTWIDTH] IN BLOOD BY AUTOMATED COUNT: 13.5 %
HCT VFR BLD AUTO: 37.5 %
HGB BLD-MCNC: 12.7 G/DL
MCH RBC QN AUTO: 31.1 PG (ref 26–34)
MCHC RBC AUTO-ENTMCNC: 33.9 G/DL (ref 31–37)
MCV RBC AUTO: 91.7 FL
PLATELET # BLD AUTO: 133 10(3)UL (ref 150–450)
RBC # BLD AUTO: 4.09 X10(6)UL
RH BLOOD TYPE: POSITIVE
WBC # BLD AUTO: 5.9 X10(3) UL (ref 4–11)

## 2023-01-04 PROCEDURE — 86901 BLOOD TYPING SEROLOGIC RH(D): CPT

## 2023-01-04 PROCEDURE — 85027 COMPLETE CBC AUTOMATED: CPT

## 2023-01-04 PROCEDURE — 36415 COLL VENOUS BLD VENIPUNCTURE: CPT

## 2023-01-04 PROCEDURE — 86900 BLOOD TYPING SEROLOGIC ABO: CPT

## 2023-01-04 PROCEDURE — 86850 RBC ANTIBODY SCREEN: CPT

## 2023-01-05 ENCOUNTER — ANESTHESIA EVENT (OUTPATIENT)
Dept: SURGERY | Facility: HOSPITAL | Age: 23
End: 2023-01-05
Payer: COMMERCIAL

## 2023-01-05 LAB — SARS-COV-2 RNA RESP QL NAA+PROBE: NOT DETECTED

## 2023-01-06 ENCOUNTER — HOSPITAL ENCOUNTER (OUTPATIENT)
Facility: HOSPITAL | Age: 23
Setting detail: HOSPITAL OUTPATIENT SURGERY
Discharge: HOME | End: 2023-01-06
Attending: OBSTETRICS & GYNECOLOGY | Admitting: OBSTETRICS & GYNECOLOGY
Payer: COMMERCIAL

## 2023-01-06 ENCOUNTER — ANESTHESIA (OUTPATIENT)
Dept: SURGERY | Facility: HOSPITAL | Age: 23
End: 2023-01-06
Payer: COMMERCIAL

## 2023-01-06 VITALS
TEMPERATURE: 98 F | DIASTOLIC BLOOD PRESSURE: 75 MMHG | RESPIRATION RATE: 16 BRPM | HEART RATE: 53 BPM | OXYGEN SATURATION: 99 % | WEIGHT: 260 LBS | SYSTOLIC BLOOD PRESSURE: 118 MMHG | BODY MASS INDEX: 38.51 KG/M2 | HEIGHT: 69 IN

## 2023-01-06 DIAGNOSIS — Z01.818 PRE-OP TESTING: Primary | ICD-10-CM

## 2023-01-06 PROCEDURE — 10D17ZZ EXTRACTION OF PRODUCTS OF CONCEPTION, RETAINED, VIA NATURAL OR ARTIFICIAL OPENING: ICD-10-PCS | Performed by: OBSTETRICS & GYNECOLOGY

## 2023-01-06 PROCEDURE — 88305 TISSUE EXAM BY PATHOLOGIST: CPT | Performed by: OBSTETRICS & GYNECOLOGY

## 2023-01-06 RX ORDER — ACETAMINOPHEN 500 MG
1000 TABLET ORAL ONCE
Status: DISCONTINUED | OUTPATIENT
Start: 2023-01-06 | End: 2023-01-06 | Stop reason: HOSPADM

## 2023-01-06 RX ORDER — ONDANSETRON 2 MG/ML
4 INJECTION INTRAMUSCULAR; INTRAVENOUS EVERY 6 HOURS PRN
Status: DISCONTINUED | OUTPATIENT
Start: 2023-01-06 | End: 2023-01-06

## 2023-01-06 RX ORDER — HYDROMORPHONE HYDROCHLORIDE 1 MG/ML
0.2 INJECTION, SOLUTION INTRAMUSCULAR; INTRAVENOUS; SUBCUTANEOUS EVERY 5 MIN PRN
Status: DISCONTINUED | OUTPATIENT
Start: 2023-01-06 | End: 2023-01-06

## 2023-01-06 RX ORDER — ONDANSETRON 2 MG/ML
INJECTION INTRAMUSCULAR; INTRAVENOUS AS NEEDED
Status: DISCONTINUED | OUTPATIENT
Start: 2023-01-06 | End: 2023-01-06 | Stop reason: SURG

## 2023-01-06 RX ORDER — NALOXONE HYDROCHLORIDE 0.4 MG/ML
80 INJECTION, SOLUTION INTRAMUSCULAR; INTRAVENOUS; SUBCUTANEOUS AS NEEDED
Status: DISCONTINUED | OUTPATIENT
Start: 2023-01-06 | End: 2023-01-06

## 2023-01-06 RX ORDER — SODIUM CHLORIDE, SODIUM LACTATE, POTASSIUM CHLORIDE, CALCIUM CHLORIDE 600; 310; 30; 20 MG/100ML; MG/100ML; MG/100ML; MG/100ML
INJECTION, SOLUTION INTRAVENOUS CONTINUOUS
Status: DISCONTINUED | OUTPATIENT
Start: 2023-01-06 | End: 2023-01-06

## 2023-01-06 RX ORDER — ACETAMINOPHEN AND CODEINE PHOSPHATE 300; 30 MG/1; MG/1
2 TABLET ORAL ONCE AS NEEDED
Status: DISCONTINUED | OUTPATIENT
Start: 2023-01-06 | End: 2023-01-06

## 2023-01-06 RX ORDER — LIDOCAINE HYDROCHLORIDE 10 MG/ML
INJECTION, SOLUTION EPIDURAL; INFILTRATION; INTRACAUDAL; PERINEURAL AS NEEDED
Status: DISCONTINUED | OUTPATIENT
Start: 2023-01-06 | End: 2023-01-06 | Stop reason: SURG

## 2023-01-06 RX ORDER — MIDAZOLAM HYDROCHLORIDE 1 MG/ML
INJECTION INTRAMUSCULAR; INTRAVENOUS AS NEEDED
Status: DISCONTINUED | OUTPATIENT
Start: 2023-01-06 | End: 2023-01-06 | Stop reason: SURG

## 2023-01-06 RX ORDER — ACETAMINOPHEN 500 MG
1000 TABLET ORAL ONCE AS NEEDED
Status: DISCONTINUED | OUTPATIENT
Start: 2023-01-06 | End: 2023-01-06

## 2023-01-06 RX ORDER — PROCHLORPERAZINE EDISYLATE 5 MG/ML
5 INJECTION INTRAMUSCULAR; INTRAVENOUS EVERY 8 HOURS PRN
Status: DISCONTINUED | OUTPATIENT
Start: 2023-01-06 | End: 2023-01-06

## 2023-01-06 RX ORDER — HYDROMORPHONE HYDROCHLORIDE 1 MG/ML
0.4 INJECTION, SOLUTION INTRAMUSCULAR; INTRAVENOUS; SUBCUTANEOUS EVERY 5 MIN PRN
Status: DISCONTINUED | OUTPATIENT
Start: 2023-01-06 | End: 2023-01-06

## 2023-01-06 RX ORDER — ACETAMINOPHEN AND CODEINE PHOSPHATE 300; 30 MG/1; MG/1
1 TABLET ORAL ONCE AS NEEDED
Status: DISCONTINUED | OUTPATIENT
Start: 2023-01-06 | End: 2023-01-06

## 2023-01-06 RX ORDER — HYDROMORPHONE HYDROCHLORIDE 1 MG/ML
0.6 INJECTION, SOLUTION INTRAMUSCULAR; INTRAVENOUS; SUBCUTANEOUS EVERY 5 MIN PRN
Status: DISCONTINUED | OUTPATIENT
Start: 2023-01-06 | End: 2023-01-06

## 2023-01-06 RX ORDER — METOCLOPRAMIDE HYDROCHLORIDE 5 MG/ML
INJECTION INTRAMUSCULAR; INTRAVENOUS AS NEEDED
Status: DISCONTINUED | OUTPATIENT
Start: 2023-01-06 | End: 2023-01-06 | Stop reason: SURG

## 2023-01-06 RX ORDER — DEXAMETHASONE SODIUM PHOSPHATE 4 MG/ML
VIAL (ML) INJECTION AS NEEDED
Status: DISCONTINUED | OUTPATIENT
Start: 2023-01-06 | End: 2023-01-06 | Stop reason: SURG

## 2023-01-06 RX ORDER — KETAMINE HYDROCHLORIDE 50 MG/ML
INJECTION, SOLUTION, CONCENTRATE INTRAMUSCULAR; INTRAVENOUS AS NEEDED
Status: DISCONTINUED | OUTPATIENT
Start: 2023-01-06 | End: 2023-01-06 | Stop reason: SURG

## 2023-01-06 RX ORDER — SCOLOPAMINE TRANSDERMAL SYSTEM 1 MG/1
1 PATCH, EXTENDED RELEASE TRANSDERMAL ONCE
Status: DISCONTINUED | OUTPATIENT
Start: 2023-01-06 | End: 2023-01-06 | Stop reason: HOSPADM

## 2023-01-06 RX ADMIN — METOCLOPRAMIDE HYDROCHLORIDE 10 MG: 5 INJECTION INTRAMUSCULAR; INTRAVENOUS at 08:17:00

## 2023-01-06 RX ADMIN — LIDOCAINE HYDROCHLORIDE 50 MG: 10 INJECTION, SOLUTION EPIDURAL; INFILTRATION; INTRACAUDAL; PERINEURAL at 08:10:00

## 2023-01-06 RX ADMIN — MIDAZOLAM HYDROCHLORIDE 2 MG: 1 INJECTION INTRAMUSCULAR; INTRAVENOUS at 08:10:00

## 2023-01-06 RX ADMIN — SODIUM CHLORIDE, SODIUM LACTATE, POTASSIUM CHLORIDE, CALCIUM CHLORIDE: 600; 310; 30; 20 INJECTION, SOLUTION INTRAVENOUS at 09:01:00

## 2023-01-06 RX ADMIN — ONDANSETRON 4 MG: 2 INJECTION INTRAMUSCULAR; INTRAVENOUS at 08:17:00

## 2023-01-06 RX ADMIN — SODIUM CHLORIDE, SODIUM LACTATE, POTASSIUM CHLORIDE, CALCIUM CHLORIDE: 600; 310; 30; 20 INJECTION, SOLUTION INTRAVENOUS at 08:10:00

## 2023-01-06 RX ADMIN — DEXAMETHASONE SODIUM PHOSPHATE 4 MG: 4 MG/ML VIAL (ML) INJECTION at 08:17:00

## 2023-01-06 RX ADMIN — KETAMINE HYDROCHLORIDE 15 MG: 50 INJECTION, SOLUTION, CONCENTRATE INTRAMUSCULAR; INTRAVENOUS at 08:25:00

## 2023-01-06 NOTE — PROGRESS NOTES
Operative Note    Preop diagnosis: Missed   Postop diagnosis: Same  Procedure:  Suction D&C  Surgeon:  Ana Flores  Anesthesia:  mac  Findings:  Uterus AV, cervix closed, adnexa WNL  Specimens:  moderate amount of products of conception  EBL:   899 cc  Complications:  None  Disposition:  Recovery room in stable condition. Procedure:   After assuring informed consent, the patient was taken to the operating room where anesthesia was administered and found to be adequate. She was placed in the dorsal lithotomy position with Keith stirrups. The perineum and vagina were prepped and draped in the usual sterile fashion. The bladder was emptied with a straight catheter. A sterile weighted speculum was placed in the vagina and the cervix was visualized. The anterior lip of the cervix was grasped with a single tooth tenaculum. The cervix was gently dilated to 8 mm. The 7 mm curved suction curette was advanced to the uterine fundus. Suction curettage was performed with a moderate amount of products of conception retrieved. A gentle sharp curettage followed with additional tissue retrieved. All instruments were removed from the vagina. The uterus was noted to be firm on exam. Good hemostasis was confirmed at the tenaculum site. The patient tolerated the procedure well and was taken to the recovery room in stable condition.    Patient blood type: O pos

## 2023-01-06 NOTE — DISCHARGE INSTRUCTIONS
Hysteroscopy and Dilatation and Curettage  Endometrial Ablation    Nicholas Farias, Michelle Simmons, Cameron, Polanco, and Weeks      After surgery you may have some light vaginal bleeding. This may last up to a week and is not a concern unless it is heavier than a menstrual period. You should avoid tampons for the first week after surgery. Also no intercourse for one week. You may experience cramping the day/evening of surgery. This is usually relieved with over the counter Acetaminophen (Tylenol), Ibuprofen (Motrin or Advil) or Naprosyn (Aleve). A prescription pain medicine may be ordered by your physician. You should rest the day of surgery. No driving for 24 hours after general anesthesia or sedation or if you are taking prescription pain medications. You may resume normal activities the next day. Showering is fine the day after surgery. Exercise is fine the next day if you are comfortable doing it. You may resume your normal diet once your appetite returns after surgery. Some patients will experience nausea from anesthesia or narcotics: we recommend you start with a light diet. Do not drink alcohol or use other sedating medications (sleep aids, sedating cold medications) if taking prescription pain medications. Resume your normal medications as instructed. Please call our office if you experience any of the following symptoms:  Temperature over 100.5 degrees  Vaginal bleeding heavier than a moderate period  Severe abdominal/pelvic pain  Shortness of breath or chest pain  New onset of leg pain and/or swelling    If a specimen was sent to pathology, you can expect a call from your doctor within 10 days with the report. If your doctor requested a post op appointment, please call to schedule an appointment for 2 weeks post op. Please call with any other questions or concerns.     Office Number: [phone]

## 2023-04-29 ENCOUNTER — HOSPITAL ENCOUNTER (EMERGENCY)
Facility: HOSPITAL | Age: 23
Discharge: HOME | End: 2023-04-29
Attending: EMERGENCY MEDICINE
Payer: COMMERCIAL

## 2023-04-29 ENCOUNTER — APPOINTMENT (OUTPATIENT)
Dept: CT IMAGING | Facility: HOSPITAL | Age: 23
End: 2023-04-29
Attending: EMERGENCY MEDICINE
Payer: COMMERCIAL

## 2023-04-29 VITALS
OXYGEN SATURATION: 100 % | SYSTOLIC BLOOD PRESSURE: 94 MMHG | RESPIRATION RATE: 16 BRPM | DIASTOLIC BLOOD PRESSURE: 63 MMHG | HEIGHT: 69 IN | HEART RATE: 50 BPM | WEIGHT: 230 LBS | BODY MASS INDEX: 34.07 KG/M2 | TEMPERATURE: 98 F

## 2023-04-29 DIAGNOSIS — I88.0 MESENTERIC ADENITIS: ICD-10-CM

## 2023-04-29 DIAGNOSIS — R10.9 ABDOMINAL PAIN OF UNKNOWN ETIOLOGY: Primary | ICD-10-CM

## 2023-04-29 PROBLEM — R17 JAUNDICE: Status: ACTIVE | Noted: 2023-04-29

## 2023-04-29 PROBLEM — D69.6 THROMBOCYTOPENIA (HCC): Status: ACTIVE | Noted: 2023-04-29

## 2023-04-29 PROBLEM — D69.6 THROMBOCYTOPENIA: Status: ACTIVE | Noted: 2023-04-29

## 2023-04-29 LAB
ALBUMIN SERPL-MCNC: 3.3 G/DL (ref 3.4–5)
ALBUMIN/GLOB SERPL: 1.1 {RATIO} (ref 1–2)
ALP LIVER SERPL-CCNC: 84 U/L
ALT SERPL-CCNC: 29 U/L
ANION GAP SERPL CALC-SCNC: 4 MMOL/L (ref 0–18)
AST SERPL-CCNC: 26 U/L (ref 15–37)
B-HCG UR QL: NEGATIVE
BASOPHILS # BLD AUTO: 0.02 X10(3) UL (ref 0–0.2)
BASOPHILS NFR BLD AUTO: 0.3 %
BILIRUB SERPL-MCNC: 0.2 MG/DL (ref 0.1–2)
BILIRUB UR QL STRIP.AUTO: NEGATIVE
BUN BLD-MCNC: 6 MG/DL (ref 7–18)
CALCIUM BLD-MCNC: 8.4 MG/DL (ref 8.5–10.1)
CHLORIDE SERPL-SCNC: 107 MMOL/L (ref 98–112)
CO2 SERPL-SCNC: 31 MMOL/L (ref 21–32)
COLOR UR AUTO: YELLOW
CREAT BLD-MCNC: 0.69 MG/DL
EOSINOPHIL # BLD AUTO: 0.1 X10(3) UL (ref 0–0.7)
EOSINOPHIL NFR BLD AUTO: 1.7 %
ERYTHROCYTE [DISTWIDTH] IN BLOOD BY AUTOMATED COUNT: 12.2 %
GFR SERPLBLD BASED ON 1.73 SQ M-ARVRAT: 124 ML/MIN/1.73M2 (ref 60–?)
GLOBULIN PLAS-MCNC: 3.1 G/DL (ref 2.8–4.4)
GLUCOSE BLD-MCNC: 76 MG/DL (ref 70–99)
GLUCOSE UR STRIP.AUTO-MCNC: NEGATIVE MG/DL
HCT VFR BLD AUTO: 40.2 %
HGB BLD-MCNC: 13.1 G/DL
IMM GRANULOCYTES # BLD AUTO: 0.02 X10(3) UL (ref 0–1)
IMM GRANULOCYTES NFR BLD: 0.3 %
KETONES UR STRIP.AUTO-MCNC: NEGATIVE MG/DL
LEUKOCYTE ESTERASE UR QL STRIP.AUTO: NEGATIVE
LYMPHOCYTES # BLD AUTO: 2.04 X10(3) UL (ref 1–4)
LYMPHOCYTES NFR BLD AUTO: 35.4 %
MCH RBC QN AUTO: 29.8 PG (ref 26–34)
MCHC RBC AUTO-ENTMCNC: 32.6 G/DL (ref 31–37)
MCV RBC AUTO: 91.4 FL
MONOCYTES # BLD AUTO: 0.62 X10(3) UL (ref 0.1–1)
MONOCYTES NFR BLD AUTO: 10.7 %
NEUTROPHILS # BLD AUTO: 2.97 X10 (3) UL (ref 1.5–7.7)
NEUTROPHILS # BLD AUTO: 2.97 X10(3) UL (ref 1.5–7.7)
NEUTROPHILS NFR BLD AUTO: 51.6 %
NITRITE UR QL STRIP.AUTO: NEGATIVE
OSMOLALITY SERPL CALC.SUM OF ELEC: 290 MOSM/KG (ref 275–295)
PH UR STRIP.AUTO: 7 [PH] (ref 5–8)
PLATELET # BLD AUTO: 142 10(3)UL (ref 150–450)
POTASSIUM SERPL-SCNC: 3.6 MMOL/L (ref 3.5–5.1)
PROT SERPL-MCNC: 6.4 G/DL (ref 6.4–8.2)
PROT UR STRIP.AUTO-MCNC: NEGATIVE MG/DL
RBC # BLD AUTO: 4.4 X10(6)UL
SODIUM SERPL-SCNC: 142 MMOL/L (ref 136–145)
SP GR UR STRIP.AUTO: 1.01 (ref 1–1.03)
UROBILINOGEN UR STRIP.AUTO-MCNC: 2 MG/DL
WBC # BLD AUTO: 5.8 X10(3) UL (ref 4–11)

## 2023-04-29 PROCEDURE — 96374 THER/PROPH/DIAG INJ IV PUSH: CPT

## 2023-04-29 PROCEDURE — 96361 HYDRATE IV INFUSION ADD-ON: CPT

## 2023-04-29 PROCEDURE — 85025 COMPLETE CBC W/AUTO DIFF WBC: CPT | Performed by: EMERGENCY MEDICINE

## 2023-04-29 PROCEDURE — 74177 CT ABD & PELVIS W/CONTRAST: CPT | Performed by: EMERGENCY MEDICINE

## 2023-04-29 PROCEDURE — 81025 URINE PREGNANCY TEST: CPT

## 2023-04-29 PROCEDURE — 99284 EMERGENCY DEPT VISIT MOD MDM: CPT

## 2023-04-29 PROCEDURE — 80053 COMPREHEN METABOLIC PANEL: CPT | Performed by: EMERGENCY MEDICINE

## 2023-04-29 PROCEDURE — 81001 URINALYSIS AUTO W/SCOPE: CPT | Performed by: EMERGENCY MEDICINE

## 2023-04-29 RX ORDER — KETOROLAC TROMETHAMINE 15 MG/ML
15 INJECTION, SOLUTION INTRAMUSCULAR; INTRAVENOUS ONCE
Status: COMPLETED | OUTPATIENT
Start: 2023-04-29 | End: 2023-04-29

## 2023-04-29 NOTE — ED INITIAL ASSESSMENT (HPI)
Pt presents with LLQ pain. Was seen at [facility] Wednesday, diagnosed with UTI, and prescribed Macrobid. Per pt, UTI symptoms still present.

## 2023-04-30 NOTE — ED QUICK NOTES
Report received from [facility] [place]. Pt resting on cart, fluid infusing. Waiting for dispo.

## 2023-11-02 ENCOUNTER — APPOINTMENT (OUTPATIENT)
Dept: ULTRASOUND IMAGING | Age: 23
End: 2023-11-02
Attending: EMERGENCY MEDICINE
Payer: COMMERCIAL

## 2023-11-02 ENCOUNTER — HOSPITAL ENCOUNTER (EMERGENCY)
Age: 23
Discharge: HOME | End: 2023-11-02
Attending: EMERGENCY MEDICINE
Payer: COMMERCIAL

## 2023-11-02 VITALS
OXYGEN SATURATION: 99 % | TEMPERATURE: 98 F | HEIGHT: 69 IN | HEART RATE: 62 BPM | DIASTOLIC BLOOD PRESSURE: 53 MMHG | RESPIRATION RATE: 16 BRPM | BODY MASS INDEX: 34.07 KG/M2 | SYSTOLIC BLOOD PRESSURE: 94 MMHG | WEIGHT: 230 LBS

## 2023-11-02 DIAGNOSIS — N92.1 MENORRHAGIA WITH IRREGULAR CYCLE: Primary | ICD-10-CM

## 2023-11-02 DIAGNOSIS — R10.9 ABDOMINAL CRAMPING: ICD-10-CM

## 2023-11-02 DIAGNOSIS — N39.0 URINARY TRACT INFECTION WITHOUT HEMATURIA, SITE UNSPECIFIED: ICD-10-CM

## 2023-11-02 LAB
ALBUMIN SERPL-MCNC: 3.4 G/DL (ref 3.4–5)
ALBUMIN/GLOB SERPL: 1 {RATIO} (ref 1–2)
ALP LIVER SERPL-CCNC: 64 U/L
ALT SERPL-CCNC: 20 U/L
ANION GAP SERPL CALC-SCNC: 4 MMOL/L (ref 0–18)
AST SERPL-CCNC: 15 U/L (ref 15–37)
B-HCG UR QL: NEGATIVE
BASOPHILS # BLD AUTO: 0.02 X10(3) UL (ref 0–0.2)
BASOPHILS NFR BLD AUTO: 0.3 %
BILIRUB SERPL-MCNC: 0.3 MG/DL (ref 0.1–2)
BILIRUB UR QL STRIP.AUTO: NEGATIVE
BUN BLD-MCNC: 7 MG/DL (ref 9–23)
CALCIUM BLD-MCNC: 8.3 MG/DL (ref 8.5–10.1)
CHLORIDE SERPL-SCNC: 107 MMOL/L (ref 98–112)
CO2 SERPL-SCNC: 29 MMOL/L (ref 21–32)
COLOR UR AUTO: YELLOW
CREAT BLD-MCNC: 0.59 MG/DL
EGFRCR SERPLBLD CKD-EPI 2021: 128 ML/MIN/1.73M2 (ref 60–?)
EOSINOPHIL # BLD AUTO: 0.11 X10(3) UL (ref 0–0.7)
EOSINOPHIL NFR BLD AUTO: 1.9 %
ERYTHROCYTE [DISTWIDTH] IN BLOOD BY AUTOMATED COUNT: 12.7 %
GLOBULIN PLAS-MCNC: 3.3 G/DL (ref 2.8–4.4)
GLUCOSE BLD-MCNC: 98 MG/DL (ref 70–99)
GLUCOSE UR STRIP.AUTO-MCNC: NEGATIVE MG/DL
HCT VFR BLD AUTO: 37.3 %
HGB BLD-MCNC: 12.7 G/DL
IMM GRANULOCYTES # BLD AUTO: 0.01 X10(3) UL (ref 0–1)
IMM GRANULOCYTES NFR BLD: 0.2 %
LEUKOCYTE ESTERASE UR QL STRIP.AUTO: NEGATIVE
LYMPHOCYTES # BLD AUTO: 1.84 X10(3) UL (ref 1–4)
LYMPHOCYTES NFR BLD AUTO: 31.9 %
MCH RBC QN AUTO: 31.2 PG (ref 26–34)
MCHC RBC AUTO-ENTMCNC: 34 G/DL (ref 31–37)
MCV RBC AUTO: 91.6 FL
MONOCYTES # BLD AUTO: 0.79 X10(3) UL (ref 0.1–1)
MONOCYTES NFR BLD AUTO: 13.7 %
NEUTROPHILS # BLD AUTO: 3 X10 (3) UL (ref 1.5–7.7)
NEUTROPHILS # BLD AUTO: 3 X10(3) UL (ref 1.5–7.7)
NEUTROPHILS NFR BLD AUTO: 52 %
NITRITE UR QL STRIP.AUTO: POSITIVE
OSMOLALITY SERPL CALC.SUM OF ELEC: 288 MOSM/KG (ref 275–295)
PH UR STRIP.AUTO: 6 [PH] (ref 5–8)
PLATELET # BLD AUTO: 131 10(3)UL (ref 150–450)
POTASSIUM SERPL-SCNC: 3.4 MMOL/L (ref 3.5–5.1)
PROT SERPL-MCNC: 6.7 G/DL (ref 6.4–8.2)
RBC # BLD AUTO: 4.07 X10(6)UL
RBC #/AREA URNS AUTO: >10 /HPF
SODIUM SERPL-SCNC: 140 MMOL/L (ref 136–145)
SP GR UR STRIP.AUTO: 1.02 (ref 1–1.03)
UROBILINOGEN UR STRIP.AUTO-MCNC: 2 MG/DL
WBC # BLD AUTO: 5.8 X10(3) UL (ref 4–11)

## 2023-11-02 PROCEDURE — 85025 COMPLETE CBC W/AUTO DIFF WBC: CPT

## 2023-11-02 PROCEDURE — 99284 EMERGENCY DEPT VISIT MOD MDM: CPT

## 2023-11-02 PROCEDURE — 85025 COMPLETE CBC W/AUTO DIFF WBC: CPT | Performed by: EMERGENCY MEDICINE

## 2023-11-02 PROCEDURE — 80053 COMPREHEN METABOLIC PANEL: CPT | Performed by: EMERGENCY MEDICINE

## 2023-11-02 PROCEDURE — 96360 HYDRATION IV INFUSION INIT: CPT

## 2023-11-02 PROCEDURE — 80053 COMPREHEN METABOLIC PANEL: CPT

## 2023-11-02 PROCEDURE — 81015 MICROSCOPIC EXAM OF URINE: CPT

## 2023-11-02 PROCEDURE — 81001 URINALYSIS AUTO W/SCOPE: CPT | Performed by: EMERGENCY MEDICINE

## 2023-11-02 PROCEDURE — 81001 URINALYSIS AUTO W/SCOPE: CPT

## 2023-11-02 PROCEDURE — 81025 URINE PREGNANCY TEST: CPT

## 2023-11-02 RX ORDER — SULFAMETHOXAZOLE AND TRIMETHOPRIM 800; 160 MG/1; MG/1
1 TABLET ORAL ONCE
Status: COMPLETED | OUTPATIENT
Start: 2023-11-02 | End: 2023-11-02

## 2023-11-02 RX ORDER — SULFAMETHOXAZOLE AND TRIMETHOPRIM 800; 160 MG/1; MG/1
1 TABLET ORAL 2 TIMES DAILY
Qty: 14 TABLET | Refills: 0 | Status: SHIPPED | OUTPATIENT
Start: 2023-11-02 | End: 2023-11-09

## 2023-11-03 NOTE — DISCHARGE INSTRUCTIONS
Please call [phone] talk to central scheduling to schedule your vaginal ultrasound for your heavy bleeding. Call your ob/gyn 1. Discuss being started on birth control. 2. Discuss getting outpatient Ultrasound for further evaluation of your bleeding with your Gyne's office. DRINK Lots of fluids. Take motrin or tylenol for cramping. Monitor your bleeding, if your soaking through more than 2 pads an hour return to the ER for further evaluation. Start your antibiotics tomorrow, first dose was given here. Finish the course.

## 2023-11-03 NOTE — ED INITIAL ASSESSMENT (HPI)
Vaginal bleeding started this morning. Accompanied by headache, and dizziness, and abdominal cramping. Sent over by . Patient states she has gone through approximately 6 pads in 12 hours.   Denies N/V/D

## 2023-12-26 DIAGNOSIS — G89.18 POST-OP PAIN: Primary | ICD-10-CM

## 2023-12-26 RX ORDER — DIAZEPAM 5 MG/1
5 TABLET ORAL EVERY 8 HOURS PRN
Qty: 20 TABLET | Refills: 0 | Status: ON HOLD | OUTPATIENT
Start: 2023-12-26

## 2023-12-26 RX ORDER — OXYCODONE HYDROCHLORIDE 5 MG/1
5 TABLET ORAL EVERY 6 HOURS PRN
Qty: 20 TABLET | Refills: 0 | Status: ON HOLD | OUTPATIENT
Start: 2023-12-26

## 2023-12-27 ENCOUNTER — HOSPITAL ENCOUNTER (INPATIENT)
Age: 23
Discharge: HOME | DRG: 862 | End: 2023-12-27
Attending: STUDENT IN AN ORGANIZED HEALTH CARE EDUCATION/TRAINING PROGRAM | Admitting: INTERNAL MEDICINE

## 2023-12-27 ENCOUNTER — APPOINTMENT (OUTPATIENT)
Dept: GENERAL RADIOLOGY | Age: 23
DRG: 862 | End: 2023-12-27
Attending: STUDENT IN AN ORGANIZED HEALTH CARE EDUCATION/TRAINING PROGRAM

## 2023-12-27 DIAGNOSIS — G89.18 POSTOPERATIVE PAIN: ICD-10-CM

## 2023-12-27 DIAGNOSIS — L76.82 OTHER POSTOPERATIVE COMPLICATION OF SKIN: Primary | ICD-10-CM

## 2023-12-27 LAB
ALBUMIN SERPL-MCNC: 2.6 G/DL (ref 3.6–5.1)
ALBUMIN/GLOB SERPL: 0.7 {RATIO} (ref 1–2.4)
ALP SERPL-CCNC: 113 UNITS/L (ref 45–117)
ALT SERPL-CCNC: 64 UNITS/L
ANION GAP SERPL CALC-SCNC: 10 MMOL/L (ref 7–19)
AST SERPL-CCNC: 58 UNITS/L
BASOPHILS # BLD: 0 K/MCL (ref 0–0.3)
BASOPHILS NFR BLD: 1 %
BILIRUB SERPL-MCNC: 0.4 MG/DL (ref 0.2–1)
BUN SERPL-MCNC: 10 MG/DL (ref 6–20)
BUN/CREAT SERPL: 14 (ref 7–25)
CALCIUM SERPL-MCNC: 8.2 MG/DL (ref 8.4–10.2)
CHLORIDE SERPL-SCNC: 102 MMOL/L (ref 97–110)
CO2 SERPL-SCNC: 27 MMOL/L (ref 21–32)
CREAT SERPL-MCNC: 0.72 MG/DL (ref 0.51–0.95)
DEPRECATED RDW RBC: 45.6 FL (ref 39–50)
EGFRCR SERPLBLD CKD-EPI 2021: >90 ML/MIN/{1.73_M2}
EOSINOPHIL # BLD: 0 K/MCL (ref 0–0.5)
EOSINOPHIL NFR BLD: 0 %
ERYTHROCYTE [DISTWIDTH] IN BLOOD: 13.4 % (ref 11–15)
FASTING DURATION TIME PATIENT: ABNORMAL H
GLOBULIN SER-MCNC: 3.8 G/DL (ref 2–4)
GLUCOSE SERPL-MCNC: 123 MG/DL (ref 70–99)
HCT VFR BLD CALC: 30.5 % (ref 36–46.5)
HGB BLD-MCNC: 9.8 G/DL (ref 12–15.5)
IMM GRANULOCYTES # BLD AUTO: 0 K/MCL (ref 0–0.2)
IMM GRANULOCYTES # BLD: 0 %
LACTATE BLDV-SCNC: 1.9 MMOL/L (ref 0–2)
LIPASE SERPL-CCNC: 21 UNITS/L (ref 15–77)
LYMPHOCYTES # BLD: 1.9 K/MCL (ref 1–4.8)
LYMPHOCYTES NFR BLD: 28 %
MCH RBC QN AUTO: 29.6 PG (ref 26–34)
MCHC RBC AUTO-ENTMCNC: 32.1 G/DL (ref 32–36.5)
MCV RBC AUTO: 92.1 FL (ref 78–100)
MONOCYTES # BLD: 0.7 K/MCL (ref 0.3–0.9)
MONOCYTES NFR BLD: 10 %
NEUTROPHILS # BLD: 4 K/MCL (ref 1.8–7.7)
NEUTROPHILS NFR BLD: 61 %
NRBC BLD MANUAL-RTO: 0 /100 WBC
PLATELET # BLD AUTO: 184 K/MCL (ref 140–450)
POTASSIUM SERPL-SCNC: 3.4 MMOL/L (ref 3.4–5.1)
PROCALCITONIN SERPL IA-MCNC: 0.16 NG/ML
PROT SERPL-MCNC: 6.4 G/DL (ref 6.4–8.2)
RBC # BLD: 3.31 MIL/MCL (ref 4–5.2)
SODIUM SERPL-SCNC: 136 MMOL/L (ref 135–145)
WBC # BLD: 6.7 K/MCL (ref 4.2–11)

## 2023-12-27 PROCEDURE — 83690 ASSAY OF LIPASE: CPT | Performed by: STUDENT IN AN ORGANIZED HEALTH CARE EDUCATION/TRAINING PROGRAM

## 2023-12-27 PROCEDURE — 85025 COMPLETE CBC W/AUTO DIFF WBC: CPT | Performed by: STUDENT IN AN ORGANIZED HEALTH CARE EDUCATION/TRAINING PROGRAM

## 2023-12-27 PROCEDURE — 93005 ELECTROCARDIOGRAM TRACING: CPT | Performed by: STUDENT IN AN ORGANIZED HEALTH CARE EDUCATION/TRAINING PROGRAM

## 2023-12-27 PROCEDURE — 71045 X-RAY EXAM CHEST 1 VIEW: CPT

## 2023-12-27 PROCEDURE — 10002800 HB RX 250 W HCPCS: Performed by: STUDENT IN AN ORGANIZED HEALTH CARE EDUCATION/TRAINING PROGRAM

## 2023-12-27 PROCEDURE — 99284 EMERGENCY DEPT VISIT MOD MDM: CPT

## 2023-12-27 PROCEDURE — 87040 BLOOD CULTURE FOR BACTERIA: CPT | Performed by: STUDENT IN AN ORGANIZED HEALTH CARE EDUCATION/TRAINING PROGRAM

## 2023-12-27 PROCEDURE — 80053 COMPREHEN METABOLIC PANEL: CPT | Performed by: STUDENT IN AN ORGANIZED HEALTH CARE EDUCATION/TRAINING PROGRAM

## 2023-12-27 PROCEDURE — C9803 HOPD COVID-19 SPEC COLLECT: HCPCS

## 2023-12-27 PROCEDURE — 83605 ASSAY OF LACTIC ACID: CPT | Performed by: STUDENT IN AN ORGANIZED HEALTH CARE EDUCATION/TRAINING PROGRAM

## 2023-12-27 PROCEDURE — 0241U COVID/FLU/RSV PANEL: CPT | Performed by: STUDENT IN AN ORGANIZED HEALTH CARE EDUCATION/TRAINING PROGRAM

## 2023-12-27 PROCEDURE — 96375 TX/PRO/DX INJ NEW DRUG ADDON: CPT

## 2023-12-27 PROCEDURE — 96365 THER/PROPH/DIAG IV INF INIT: CPT

## 2023-12-27 PROCEDURE — 96367 TX/PROPH/DG ADDL SEQ IV INF: CPT

## 2023-12-27 PROCEDURE — 84145 PROCALCITONIN (PCT): CPT | Performed by: STUDENT IN AN ORGANIZED HEALTH CARE EDUCATION/TRAINING PROGRAM

## 2023-12-27 PROCEDURE — 10002807 HB RX 258: Performed by: STUDENT IN AN ORGANIZED HEALTH CARE EDUCATION/TRAINING PROGRAM

## 2023-12-27 RX ORDER — CLINDAMYCIN HYDROCHLORIDE 300 MG/1
300 CAPSULE ORAL 3 TIMES DAILY
Qty: 21 CAPSULE | Refills: 0 | Status: ON HOLD | OUTPATIENT
Start: 2023-12-27 | End: 2024-01-03

## 2023-12-27 RX ADMIN — SODIUM CHLORIDE 1000 ML: 9 INJECTION, SOLUTION INTRAVENOUS at 23:01

## 2023-12-27 RX ADMIN — PIPERACILLIN SODIUM AND TAZOBACTAM SODIUM 4.5 G: 4; .5 INJECTION, POWDER, FOR SOLUTION INTRAVENOUS at 23:05

## 2023-12-28 ENCOUNTER — APPOINTMENT (OUTPATIENT)
Dept: ULTRASOUND IMAGING | Age: 23
DRG: 862 | End: 2023-12-28
Attending: INTERNAL MEDICINE

## 2023-12-28 ENCOUNTER — APPOINTMENT (OUTPATIENT)
Dept: CT IMAGING | Age: 23
DRG: 862 | End: 2023-12-28
Attending: INTERNAL MEDICINE

## 2023-12-28 PROBLEM — L76.82: Status: ACTIVE | Noted: 2023-12-28

## 2023-12-28 LAB
ABO + RH BLD: NORMAL
ANION GAP SERPL CALC-SCNC: 9 MMOL/L (ref 7–19)
APPEARANCE UR: CLEAR
APTT PPP: 28 SEC (ref 22–32)
ATRIAL RATE (BPM): 117
B PARAPERT DNA SPEC QL NAA+PROBE: NOT DETECTED
B PERT.PT PRMT NPH QL NAA+NON-PROBE: NOT DETECTED
BILIRUB UR QL STRIP: NEGATIVE
BLD GP AB SCN SERPL QL GEL: NEGATIVE
BUN SERPL-MCNC: 10 MG/DL (ref 6–20)
BUN/CREAT SERPL: 16 (ref 7–25)
C PNEUM DNA NPH QL NAA+NON-PROBE: NOT DETECTED
CALCIUM SERPL-MCNC: 7 MG/DL (ref 8.4–10.2)
CHLORIDE SERPL-SCNC: 110 MMOL/L (ref 97–110)
CO2 SERPL-SCNC: 22 MMOL/L (ref 21–32)
COLOR UR: YELLOW
CREAT SERPL-MCNC: 0.62 MG/DL (ref 0.51–0.95)
DEPRECATED RDW RBC: 46.4 FL (ref 39–50)
EGFRCR SERPLBLD CKD-EPI 2021: >90 ML/MIN/{1.73_M2}
ERYTHROCYTE [DISTWIDTH] IN BLOOD: 13.6 % (ref 11–15)
FASTING DURATION TIME PATIENT: ABNORMAL H
FLUAV RNA NPH QL NAA+NON-PROBE: NOT DETECTED
FLUAV RNA RESP QL NAA+PROBE: NOT DETECTED
FLUBV RNA NPH QL NAA+NON-PROBE: NOT DETECTED
FLUBV RNA RESP QL NAA+PROBE: NOT DETECTED
GLUCOSE SERPL-MCNC: 109 MG/DL (ref 70–99)
GLUCOSE UR STRIP-MCNC: NEGATIVE MG/DL
HADV DNA NPH QL NAA+NON-PROBE: NOT DETECTED
HCOV 229E RNA NPH QL NAA+NON-PROBE: NOT DETECTED
HCOV HKU1 RNA NPH QL NAA+NON-PROBE: NOT DETECTED
HCOV NL63 RNA NPH QL NAA+NON-PROBE: NOT DETECTED
HCOV OC43 RNA NPH QL NAA+NON-PROBE: NOT DETECTED
HCT VFR BLD CALC: 24.8 % (ref 36–46.5)
HGB BLD-MCNC: 7.9 G/DL (ref 12–15.5)
HGB UR QL STRIP: NEGATIVE
HMPV RNA NPH QL NAA+NON-PROBE: NOT DETECTED
HPIV1 RNA NPH QL NAA+NON-PROBE: NOT DETECTED
HPIV2 RNA NPH QL NAA+NON-PROBE: NOT DETECTED
HPIV3 RNA NPH QL NAA+NON-PROBE: NOT DETECTED
HPIV4 RNA NPH QL NAA+NON-PROBE: NOT DETECTED
INR PPP: 1.1
KETONES UR STRIP-MCNC: NEGATIVE MG/DL
LACTATE BLDV-SCNC: 1.5 MMOL/L (ref 0–2)
LEUKOCYTE ESTERASE UR QL STRIP: NEGATIVE
M PNEUMO DNA NPH QL NAA+NON-PROBE: NOT DETECTED
MAGNESIUM SERPL-MCNC: 1.7 MG/DL (ref 1.7–2.4)
MCH RBC QN AUTO: 29.6 PG (ref 26–34)
MCHC RBC AUTO-ENTMCNC: 31.9 G/DL (ref 32–36.5)
MCV RBC AUTO: 92.9 FL (ref 78–100)
MRSA DNA SPEC QL NAA+PROBE: NOT DETECTED
NITRITE UR QL STRIP: NEGATIVE
NRBC BLD MANUAL-RTO: 0 /100 WBC
NT-PROBNP SERPL-MCNC: 109 PG/ML
P AXIS (DEGREES): 50
PH UR STRIP: 6.5 [PH] (ref 5–7)
PLATELET # BLD AUTO: 138 K/MCL (ref 140–450)
POTASSIUM SERPL-SCNC: 3.4 MMOL/L (ref 3.4–5.1)
POTASSIUM SERPL-SCNC: 4.1 MMOL/L (ref 3.4–5.1)
PR-INTERVAL (MSEC): 156
PROT UR STRIP-MCNC: ABNORMAL MG/DL
PROTHROMBIN TIME: 11.7 SEC (ref 9.7–11.8)
QRS-INTERVAL (MSEC): 74
QT-INTERVAL (MSEC): 282
QTC: 393
R AXIS (DEGREES): 33
RAINBOW EXTRA TUBES HOLD SPECIMEN: NORMAL
RAINBOW EXTRA TUBES HOLD SPECIMEN: NORMAL
RBC # BLD: 2.67 MIL/MCL (ref 4–5.2)
REPORT TEXT: NORMAL
RSV AG NPH QL IA.RAPID: NOT DETECTED
RSV RNA NPH QL NAA+NON-PROBE: NOT DETECTED
RV+EV RNA NPH QL NAA+NON-PROBE: NOT DETECTED
SARS-COV-2 RNA RESP QL NAA+PROBE: NOT DETECTED
SARS-COV-2 RNA RESP QL NAA+PROBE: NOT DETECTED
SERVICE CMNT-IMP: NORMAL
SERVICE CMNT-IMP: NORMAL
SODIUM SERPL-SCNC: 138 MMOL/L (ref 135–145)
SP GR UR STRIP: >1.03 (ref 1–1.03)
T AXIS (DEGREES): -27
TROPONIN I SERPL DL<=0.01 NG/ML-MCNC: 7 NG/L
TYPE AND SCREEN EXPIRATION DATE: NORMAL
UROBILINOGEN UR STRIP-MCNC: 4 MG/DL
VENTRICULAR RATE EKG/MIN (BPM): 117
WBC # BLD: 6.6 K/MCL (ref 4.2–11)

## 2023-12-28 PROCEDURE — 10006031 HB ROOM CHARGE TELEMETRY

## 2023-12-28 PROCEDURE — 10002800 HB RX 250 W HCPCS: Performed by: STUDENT IN AN ORGANIZED HEALTH CARE EDUCATION/TRAINING PROGRAM

## 2023-12-28 PROCEDURE — 71275 CT ANGIOGRAPHY CHEST: CPT

## 2023-12-28 PROCEDURE — 10002807 HB RX 258: Performed by: SPECIALIST

## 2023-12-28 PROCEDURE — 10002807 HB RX 258: Performed by: INTERNAL MEDICINE

## 2023-12-28 PROCEDURE — 83605 ASSAY OF LACTIC ACID: CPT | Performed by: INTERNAL MEDICINE

## 2023-12-28 PROCEDURE — 83735 ASSAY OF MAGNESIUM: CPT | Performed by: INTERNAL MEDICINE

## 2023-12-28 PROCEDURE — 99223 1ST HOSP IP/OBS HIGH 75: CPT | Performed by: INTERNAL MEDICINE

## 2023-12-28 PROCEDURE — 83880 ASSAY OF NATRIURETIC PEPTIDE: CPT | Performed by: INTERNAL MEDICINE

## 2023-12-28 PROCEDURE — 36415 COLL VENOUS BLD VENIPUNCTURE: CPT | Performed by: INTERNAL MEDICINE

## 2023-12-28 PROCEDURE — 82728 ASSAY OF FERRITIN: CPT | Performed by: INTERNAL MEDICINE

## 2023-12-28 PROCEDURE — 84484 ASSAY OF TROPONIN QUANT: CPT | Performed by: INTERNAL MEDICINE

## 2023-12-28 PROCEDURE — 0202U NFCT DS 22 TRGT SARS-COV-2: CPT | Performed by: INTERNAL MEDICINE

## 2023-12-28 PROCEDURE — 83540 ASSAY OF IRON: CPT | Performed by: INTERNAL MEDICINE

## 2023-12-28 PROCEDURE — 10002801 HB RX 250 W/O HCPCS: Performed by: STUDENT IN AN ORGANIZED HEALTH CARE EDUCATION/TRAINING PROGRAM

## 2023-12-28 PROCEDURE — 86850 RBC ANTIBODY SCREEN: CPT | Performed by: INTERNAL MEDICINE

## 2023-12-28 PROCEDURE — 96372 THER/PROPH/DIAG INJ SC/IM: CPT | Performed by: INTERNAL MEDICINE

## 2023-12-28 PROCEDURE — 85730 THROMBOPLASTIN TIME PARTIAL: CPT | Performed by: INTERNAL MEDICINE

## 2023-12-28 PROCEDURE — 93005 ELECTROCARDIOGRAM TRACING: CPT | Performed by: INTERNAL MEDICINE

## 2023-12-28 PROCEDURE — 81003 URINALYSIS AUTO W/O SCOPE: CPT | Performed by: STUDENT IN AN ORGANIZED HEALTH CARE EDUCATION/TRAINING PROGRAM

## 2023-12-28 PROCEDURE — 10002807 HB RX 258: Performed by: STUDENT IN AN ORGANIZED HEALTH CARE EDUCATION/TRAINING PROGRAM

## 2023-12-28 PROCEDURE — 84132 ASSAY OF SERUM POTASSIUM: CPT | Performed by: INTERNAL MEDICINE

## 2023-12-28 PROCEDURE — 10002803 HB RX 637: Performed by: SPECIALIST

## 2023-12-28 PROCEDURE — 10002800 HB RX 250 W HCPCS: Performed by: INTERNAL MEDICINE

## 2023-12-28 PROCEDURE — 87641 MR-STAPH DNA AMP PROBE: CPT | Performed by: INTERNAL MEDICINE

## 2023-12-28 PROCEDURE — 85610 PROTHROMBIN TIME: CPT | Performed by: INTERNAL MEDICINE

## 2023-12-28 PROCEDURE — 85027 COMPLETE CBC AUTOMATED: CPT | Performed by: INTERNAL MEDICINE

## 2023-12-28 PROCEDURE — 10002801 HB RX 250 W/O HCPCS: Performed by: INTERNAL MEDICINE

## 2023-12-28 PROCEDURE — 10004651 HB RX, NO CHARGE ITEM: Performed by: STUDENT IN AN ORGANIZED HEALTH CARE EDUCATION/TRAINING PROGRAM

## 2023-12-28 PROCEDURE — 10002803 HB RX 637: Performed by: INTERNAL MEDICINE

## 2023-12-28 PROCEDURE — 93970 EXTREMITY STUDY: CPT

## 2023-12-28 PROCEDURE — 10002805 HB CONTRAST AGENT: Performed by: INTERNAL MEDICINE

## 2023-12-28 PROCEDURE — 10004651 HB RX, NO CHARGE ITEM: Performed by: INTERNAL MEDICINE

## 2023-12-28 PROCEDURE — 80048 BASIC METABOLIC PNL TOTAL CA: CPT | Performed by: INTERNAL MEDICINE

## 2023-12-28 RX ORDER — ACETAMINOPHEN 650 MG/1
650 SUPPOSITORY RECTAL EVERY 4 HOURS PRN
Status: DISCONTINUED | OUTPATIENT
Start: 2023-12-28 | End: 2024-01-02 | Stop reason: HOSPADM

## 2023-12-28 RX ORDER — LANOLIN ALCOHOL/MO/W.PET/CERES
400 CREAM (GRAM) TOPICAL ONCE
Status: COMPLETED | OUTPATIENT
Start: 2023-12-28 | End: 2023-12-28

## 2023-12-28 RX ORDER — HEPARIN SODIUM 1000 [USP'U]/ML
5000 INJECTION, SOLUTION INTRAVENOUS; SUBCUTANEOUS PRN
Status: DISCONTINUED | OUTPATIENT
Start: 2023-12-28 | End: 2023-12-29

## 2023-12-28 RX ORDER — AMOXICILLIN 250 MG
2 CAPSULE ORAL 2 TIMES DAILY PRN
Status: DISCONTINUED | OUTPATIENT
Start: 2023-12-28 | End: 2024-01-02 | Stop reason: HOSPADM

## 2023-12-28 RX ORDER — POTASSIUM CHLORIDE 20 MEQ/1
40 TABLET, EXTENDED RELEASE ORAL ONCE
Status: COMPLETED | OUTPATIENT
Start: 2023-12-28 | End: 2023-12-28

## 2023-12-28 RX ORDER — BISACODYL 10 MG
10 SUPPOSITORY, RECTAL RECTAL DAILY PRN
Status: DISCONTINUED | OUTPATIENT
Start: 2023-12-28 | End: 2024-01-02 | Stop reason: HOSPADM

## 2023-12-28 RX ORDER — POLYETHYLENE GLYCOL 3350 17 G/17G
17 POWDER, FOR SOLUTION ORAL DAILY PRN
Status: DISCONTINUED | OUTPATIENT
Start: 2023-12-28 | End: 2024-01-02 | Stop reason: HOSPADM

## 2023-12-28 RX ORDER — ENOXAPARIN SODIUM 100 MG/ML
40 INJECTION SUBCUTANEOUS DAILY
Status: DISCONTINUED | OUTPATIENT
Start: 2023-12-28 | End: 2023-12-28 | Stop reason: DRUGHIGH

## 2023-12-28 RX ORDER — DIAZEPAM 5 MG/1
5 TABLET ORAL EVERY 8 HOURS PRN
Status: DISCONTINUED | OUTPATIENT
Start: 2023-12-28 | End: 2024-01-02 | Stop reason: HOSPADM

## 2023-12-28 RX ORDER — HEPARIN SODIUM 1000 [USP'U]/ML
10000 INJECTION, SOLUTION INTRAVENOUS; SUBCUTANEOUS PRN
Status: DISCONTINUED | OUTPATIENT
Start: 2023-12-28 | End: 2023-12-29

## 2023-12-28 RX ORDER — PROCHLORPERAZINE EDISYLATE 5 MG/ML
5 INJECTION INTRAMUSCULAR; INTRAVENOUS EVERY 4 HOURS PRN
Status: DISCONTINUED | OUTPATIENT
Start: 2023-12-28 | End: 2024-01-02 | Stop reason: HOSPADM

## 2023-12-28 RX ORDER — ACETAMINOPHEN 325 MG/1
650 TABLET ORAL EVERY 4 HOURS PRN
Status: DISCONTINUED | OUTPATIENT
Start: 2023-12-28 | End: 2024-01-02 | Stop reason: HOSPADM

## 2023-12-28 RX ORDER — LANOLIN ALCOHOL/MO/W.PET/CERES
3 CREAM (GRAM) TOPICAL NIGHTLY PRN
Status: DISCONTINUED | OUTPATIENT
Start: 2023-12-28 | End: 2024-01-02 | Stop reason: HOSPADM

## 2023-12-28 RX ORDER — PROCHLORPERAZINE MALEATE 5 MG/1
5 TABLET ORAL EVERY 4 HOURS PRN
Status: DISCONTINUED | OUTPATIENT
Start: 2023-12-28 | End: 2024-01-02 | Stop reason: HOSPADM

## 2023-12-28 RX ORDER — HEPARIN SODIUM 10000 [USP'U]/100ML
1-30 INJECTION, SOLUTION INTRAVENOUS CONTINUOUS
Status: DISCONTINUED | OUTPATIENT
Start: 2023-12-28 | End: 2023-12-28

## 2023-12-28 RX ORDER — GABAPENTIN 300 MG/1
300 CAPSULE ORAL 3 TIMES DAILY PRN
Status: DISCONTINUED | OUTPATIENT
Start: 2023-12-28 | End: 2024-01-02 | Stop reason: HOSPADM

## 2023-12-28 RX ORDER — OXYCODONE HYDROCHLORIDE 5 MG/1
5 TABLET ORAL EVERY 6 HOURS PRN
Status: DISCONTINUED | OUTPATIENT
Start: 2023-12-28 | End: 2024-01-02 | Stop reason: HOSPADM

## 2023-12-28 RX ORDER — ACETAMINOPHEN 500 MG
1000 TABLET ORAL ONCE
Status: COMPLETED | OUTPATIENT
Start: 2023-12-28 | End: 2023-12-28

## 2023-12-28 RX ORDER — SODIUM CHLORIDE, SODIUM LACTATE, POTASSIUM CHLORIDE, CALCIUM CHLORIDE 600; 310; 30; 20 MG/100ML; MG/100ML; MG/100ML; MG/100ML
INJECTION, SOLUTION INTRAVENOUS CONTINUOUS
Status: DISCONTINUED | OUTPATIENT
Start: 2023-12-28 | End: 2024-01-01

## 2023-12-28 RX ORDER — 0.9 % SODIUM CHLORIDE 0.9 %
2 VIAL (ML) INJECTION EVERY 12 HOURS SCHEDULED
Status: DISCONTINUED | OUTPATIENT
Start: 2023-12-28 | End: 2024-01-02 | Stop reason: HOSPADM

## 2023-12-28 RX ORDER — HEPARIN SODIUM 10000 [USP'U]/100ML
1-40 INJECTION, SOLUTION INTRAVENOUS CONTINUOUS
Status: DISCONTINUED | OUTPATIENT
Start: 2023-12-28 | End: 2023-12-29

## 2023-12-28 RX ORDER — HEPARIN SODIUM 1000 [USP'U]/ML
10000 INJECTION, SOLUTION INTRAVENOUS; SUBCUTANEOUS ONCE
Status: COMPLETED | OUTPATIENT
Start: 2023-12-28 | End: 2023-12-28

## 2023-12-28 RX ORDER — KETOROLAC TROMETHAMINE 15 MG/ML
15 INJECTION, SOLUTION INTRAMUSCULAR; INTRAVENOUS ONCE
Status: COMPLETED | OUTPATIENT
Start: 2023-12-28 | End: 2023-12-28

## 2023-12-28 RX ORDER — ENOXAPARIN SODIUM 100 MG/ML
40 INJECTION SUBCUTANEOUS EVERY 12 HOURS
Status: DISCONTINUED | OUTPATIENT
Start: 2023-12-28 | End: 2023-12-28

## 2023-12-28 RX ORDER — MAGNESIUM HYDROXIDE/ALUMINUM HYDROXICE/SIMETHICONE 120; 1200; 1200 MG/30ML; MG/30ML; MG/30ML
30 SUSPENSION ORAL EVERY 4 HOURS PRN
Status: DISCONTINUED | OUTPATIENT
Start: 2023-12-28 | End: 2024-01-02 | Stop reason: HOSPADM

## 2023-12-28 RX ORDER — GABAPENTIN 300 MG/1
300 CAPSULE ORAL 3 TIMES DAILY PRN
Status: ON HOLD | COMMUNITY
End: 2023-12-31 | Stop reason: HOSPADM

## 2023-12-28 RX ORDER — ONDANSETRON 4 MG/1
4 TABLET, FILM COATED ORAL EVERY 8 HOURS PRN
Status: ON HOLD | COMMUNITY
End: 2023-12-31 | Stop reason: HOSPADM

## 2023-12-28 RX ORDER — ONDANSETRON 2 MG/ML
4 INJECTION INTRAMUSCULAR; INTRAVENOUS EVERY 8 HOURS PRN
Status: DISCONTINUED | OUTPATIENT
Start: 2023-12-28 | End: 2024-01-02 | Stop reason: HOSPADM

## 2023-12-28 RX ADMIN — ACETAMINOPHEN 1000 MG: 500 TABLET ORAL at 00:37

## 2023-12-28 RX ADMIN — DIAZEPAM 5 MG: 5 TABLET ORAL at 21:41

## 2023-12-28 RX ADMIN — ENOXAPARIN SODIUM 40 MG: 100 INJECTION SUBCUTANEOUS at 09:00

## 2023-12-28 RX ADMIN — POTASSIUM CHLORIDE 40 MEQ: 1500 TABLET, EXTENDED RELEASE ORAL at 09:01

## 2023-12-28 RX ADMIN — MEROPENEM 500 MG: 500 INJECTION INTRAVENOUS at 23:17

## 2023-12-28 RX ADMIN — HEPARIN SODIUM AND DEXTROSE 8 UNITS/KG/HR: 10000; 5 INJECTION INTRAVENOUS at 20:22

## 2023-12-28 RX ADMIN — VANCOMYCIN HYDROCHLORIDE 2000 MG: 10 INJECTION, POWDER, LYOPHILIZED, FOR SOLUTION INTRAVENOUS at 00:01

## 2023-12-28 RX ADMIN — VANCOMYCIN HYDROCHLORIDE 1750 MG: 10 INJECTION, POWDER, LYOPHILIZED, FOR SOLUTION INTRAVENOUS at 12:21

## 2023-12-28 RX ADMIN — PROCHLORPERAZINE EDISYLATE 5 MG: 5 INJECTION INTRAMUSCULAR; INTRAVENOUS at 15:18

## 2023-12-28 RX ADMIN — SODIUM CHLORIDE, PRESERVATIVE FREE 2 ML: 5 INJECTION INTRAVENOUS at 20:27

## 2023-12-28 RX ADMIN — Medication 400 MG: at 12:27

## 2023-12-28 RX ADMIN — SODIUM CHLORIDE 1000 ML: 9 INJECTION, SOLUTION INTRAVENOUS at 01:34

## 2023-12-28 RX ADMIN — PIPERACILLIN SODIUM AND TAZOBACTAM SODIUM 3.38 G: 3; .375 INJECTION, POWDER, FOR SOLUTION INTRAVENOUS at 08:19

## 2023-12-28 RX ADMIN — SODIUM CHLORIDE 25 ML: 9 INJECTION, SOLUTION INTRAVENOUS at 08:18

## 2023-12-28 RX ADMIN — IOHEXOL 150 ML: 350 INJECTION, SOLUTION INTRAVENOUS at 21:13

## 2023-12-28 RX ADMIN — SODIUM CHLORIDE 1000 ML: 9 INJECTION, SOLUTION INTRAVENOUS at 00:34

## 2023-12-28 RX ADMIN — MEROPENEM 500 MG: 500 INJECTION INTRAVENOUS at 14:27

## 2023-12-28 RX ADMIN — SODIUM CHLORIDE, POTASSIUM CHLORIDE, SODIUM LACTATE AND CALCIUM CHLORIDE: 600; 310; 30; 20 INJECTION, SOLUTION INTRAVENOUS at 14:29

## 2023-12-28 RX ADMIN — KETOROLAC TROMETHAMINE 15 MG: 15 INJECTION, SOLUTION INTRAMUSCULAR; INTRAVENOUS at 01:22

## 2023-12-28 RX ADMIN — SILVER SULFADIAZINE: 10 CREAM TOPICAL at 20:25

## 2023-12-28 RX ADMIN — MEROPENEM 500 MG: 500 INJECTION INTRAVENOUS at 19:30

## 2023-12-28 RX ADMIN — HEPARIN SODIUM AND DEXTROSE 15 UNITS/KG/HR: 10000; 5 INJECTION INTRAVENOUS at 23:20

## 2023-12-28 RX ADMIN — SODIUM CHLORIDE, POTASSIUM CHLORIDE, SODIUM LACTATE AND CALCIUM CHLORIDE: 600; 310; 30; 20 INJECTION, SOLUTION INTRAVENOUS at 05:26

## 2023-12-28 RX ADMIN — VANCOMYCIN HYDROCHLORIDE 1750 MG: 10 INJECTION, POWDER, LYOPHILIZED, FOR SOLUTION INTRAVENOUS at 23:18

## 2023-12-28 RX ADMIN — Medication 400 MG: at 20:23

## 2023-12-28 RX ADMIN — OXYCODONE HYDROCHLORIDE 5 MG: 5 TABLET ORAL at 05:23

## 2023-12-28 RX ADMIN — SILVER SULFADIAZINE: 10 CREAM TOPICAL at 12:24

## 2023-12-28 RX ADMIN — HEPARIN SODIUM 10000 UNITS: 1000 INJECTION, SOLUTION INTRAVENOUS; SUBCUTANEOUS at 23:09

## 2023-12-28 SDOH — HEALTH STABILITY: GENERAL
BECAUSE OF A PHYSICAL, MENTAL, OR EMOTIONAL CONDITION, DO YOU HAVE SERIOUS DIFFICULTY CONCENTRATING, REMEMBERING OR MAKING DECISIONS?: NO

## 2023-12-28 SDOH — HEALTH STABILITY: PHYSICAL HEALTH: DO YOU HAVE DIFFICULTY DRESSING OR BATHING?: YES

## 2023-12-28 SDOH — HEALTH STABILITY: GENERAL: BECAUSE OF A PHYSICAL, MENTAL, OR EMOTIONAL CONDITION, DO YOU HAVE DIFFICULTY DOING ERRANDS ALONE?: NO

## 2023-12-28 SDOH — ECONOMIC STABILITY: FOOD INSECURITY: WITHIN THE PAST 12 MONTHS, THE FOOD YOU BOUGHT JUST DIDN'T LAST AND YOU DIDN'T HAVE MONEY TO GET MORE.: NEVER TRUE

## 2023-12-28 SDOH — ECONOMIC STABILITY: GENERAL

## 2023-12-28 SDOH — ECONOMIC STABILITY: HOUSING INSECURITY: WHAT IS YOUR LIVING SITUATION TODAY?: I HAVE A STEADY PLACE TO LIVE

## 2023-12-28 SDOH — ECONOMIC STABILITY: INCOME INSECURITY: IN THE PAST 12 MONTHS, HAS THE ELECTRIC, GAS, OIL, OR WATER COMPANY THREATENED TO SHUT OFF SERVICE IN YOUR HOME?: NO

## 2023-12-28 SDOH — SOCIAL STABILITY: SOCIAL INSECURITY: HOW OFTEN DOES ANYONE, INCLUDING FAMILY AND FRIENDS, THREATEN YOU WITH HARM?: NEVER

## 2023-12-28 SDOH — SOCIAL STABILITY: SOCIAL INSECURITY: HOW OFTEN DOES ANYONE, INCLUDING FAMILY AND FRIENDS, INSULT OR TALK DOWN TO YOU?: NEVER

## 2023-12-28 SDOH — SOCIAL STABILITY: SOCIAL INSECURITY: HOW OFTEN DOES ANYONE, INCLUDING FAMILY AND FRIENDS, PHYSICALLY HURT YOU?: NEVER

## 2023-12-28 SDOH — ECONOMIC STABILITY: HOUSING INSECURITY: WHAT IS YOUR LIVING SITUATION TODAY?: HOUSE

## 2023-12-28 SDOH — SOCIAL STABILITY: SOCIAL NETWORK
HOW OFTEN DO YOU SEE OR TALK TO PEOPLE THAT YOU CARE ABOUT AND FEEL CLOSE TO? (FOR EXAMPLE: TALKING TO FRIENDS ON THE PHONE, VISITING FRIENDS OR FAMILY, GOING TO CHURCH OR CLUB MEETINGS): 5 OR MORE TIMES A WEEK

## 2023-12-28 SDOH — ECONOMIC STABILITY: GENERAL: WOULD YOU LIKE HELP WITH ANY OF THE FOLLOWING NEEDS?: I DON'T WANT HELP WITH ANY OF THESE

## 2023-12-28 SDOH — ECONOMIC STABILITY: TRANSPORTATION INSECURITY
IN THE PAST 12 MONTHS, HAS LACK OF RELIABLE TRANSPORTATION KEPT YOU FROM MEDICAL APPOINTMENTS, MEETINGS, WORK OR FROM GETTING THINGS NEEDED FOR DAILY LIVING?: NO

## 2023-12-28 SDOH — SOCIAL STABILITY: SOCIAL INSECURITY: HOW OFTEN DOES ANYONE, INCLUDING FAMILY AND FRIENDS, SCREAM OR CURSE AT YOU?: NEVER

## 2023-12-28 SDOH — HEALTH STABILITY: PHYSICAL HEALTH: DO YOU HAVE SERIOUS DIFFICULTY WALKING OR CLIMBING STAIRS?: NO

## 2023-12-28 SDOH — ECONOMIC STABILITY: HOUSING INSECURITY: DO YOU HAVE PROBLEMS WITH ANY OF THE FOLLOWING?: NONE OF THE ABOVE

## 2023-12-28 SDOH — ECONOMIC STABILITY: HOUSING INSECURITY: WHAT IS YOUR LIVING SITUATION TODAY?: CHILDREN

## 2023-12-28 ASSESSMENT — PATIENT HEALTH QUESTIONNAIRE - PHQ9
SUM OF ALL RESPONSES TO PHQ9 QUESTIONS 1 AND 2: 0
CLINICAL INTERPRETATION OF PHQ2 SCORE: NO FURTHER SCREENING NEEDED
IS PATIENT ABLE TO COMPLETE PHQ2 OR PHQ9: YES
2. FEELING DOWN, DEPRESSED OR HOPELESS: NOT AT ALL
1. LITTLE INTEREST OR PLEASURE IN DOING THINGS: NOT AT ALL
SUM OF ALL RESPONSES TO PHQ9 QUESTIONS 1 AND 2: 0

## 2023-12-28 ASSESSMENT — COGNITIVE AND FUNCTIONAL STATUS - GENERAL
BECAUSE OF A PHYSICAL, MENTAL, OR EMOTIONAL CONDITION, DO YOU HAVE SERIOUS DIFFICULTY CONCENTRATING, REMEMBERING OR MAKING DECISIONS: NO
BECAUSE OF A PHYSICAL, MENTAL, OR EMOTIONAL CONDITION, DO YOU HAVE DIFFICULTY DOING ERRANDS ALONE: NO
DO YOU HAVE SERIOUS DIFFICULTY WALKING OR CLIMBING STAIRS: NO
DO YOU HAVE DIFFICULTY DRESSING OR BATHING: NO

## 2023-12-28 ASSESSMENT — LIFESTYLE VARIABLES
ALCOHOL_USE_STATUS: NO OR LOW RISK WITH VALIDATED TOOL
AUDIT-C TOTAL SCORE: 0
HOW MANY STANDARD DRINKS CONTAINING ALCOHOL DO YOU HAVE ON A TYPICAL DAY: 0,1 OR 2
HOW OFTEN DO YOU HAVE 6 OR MORE DRINKS ON ONE OCCASION: NEVER
HOW OFTEN DO YOU HAVE A DRINK CONTAINING ALCOHOL: NEVER

## 2023-12-28 ASSESSMENT — ACTIVITIES OF DAILY LIVING (ADL)
ADL_BEFORE_ADMISSION: INDEPENDENT
ADL_SCORE: 12
ADL_SHORT_OF_BREATH: YES
RECENT_DECLINE_ADL: YES, ACUTE ILLNESS WITHOUT THERAPY NEEDS

## 2023-12-28 ASSESSMENT — COLUMBIA-SUICIDE SEVERITY RATING SCALE - C-SSRS
6. HAVE YOU EVER DONE ANYTHING, STARTED TO DO ANYTHING, OR PREPARED TO DO ANYTHING TO END YOUR LIFE?: NO
1. WITHIN THE PAST MONTH, HAVE YOU WISHED YOU WERE DEAD OR WISHED YOU COULD GO TO SLEEP AND NOT WAKE UP?: NO
IS THE PATIENT ABLE TO COMPLETE C-SSRS: YES
2. HAVE YOU ACTUALLY HAD ANY THOUGHTS OF KILLING YOURSELF?: NO

## 2023-12-28 ASSESSMENT — PAIN SCALES - GENERAL
PAINLEVEL_OUTOF10: 3
PAINLEVEL_OUTOF10: 6
PAINLEVEL_OUTOF10: 3
PAINLEVEL_OUTOF10: 4
PAINLEVEL_OUTOF10: 0
PAINLEVEL_OUTOF10: 9
PAINLEVEL_OUTOF10: 7

## 2023-12-28 ASSESSMENT — ENCOUNTER SYMPTOMS
ABDOMINAL PAIN: 1
FEVER: 1

## 2023-12-29 ENCOUNTER — APPOINTMENT (OUTPATIENT)
Dept: CARDIOLOGY | Age: 23
DRG: 862 | End: 2023-12-29
Attending: INTERNAL MEDICINE

## 2023-12-29 LAB
AORTIC VALVE AREA (AVA): 1
APTT PPP: 49 SEC (ref 22–32)
APTT PPP: >200 SEC (ref 22–32)
APTT PPP: >200 SEC (ref 22–32)
ASCENDING AORTA (AAD): 3
ATRIAL RATE (BPM): 126
AV STENOSIS SEVERITY TEXT: NORMAL
AVI LVOT PEAK GRADIENT (LVOTMG): 0.8
BASOPHILS # BLD: 0 K/MCL (ref 0–0.3)
BASOPHILS NFR BLD: 0 %
DEPRECATED RDW RBC: 45.9 FL (ref 39–50)
DEPRECATED RDW RBC: 46.8 FL (ref 39–50)
EOSINOPHIL # BLD: 0.1 K/MCL (ref 0–0.5)
EOSINOPHIL NFR BLD: 3 %
ERYTHROCYTE [DISTWIDTH] IN BLOOD: 13.6 % (ref 11–15)
ERYTHROCYTE [DISTWIDTH] IN BLOOD: 13.9 % (ref 11–15)
FERRITIN SERPL-MCNC: 38 NG/ML (ref 8–252)
FIBRINOGEN PPP-MCNC: 435 MG/DL (ref 190–425)
GLUCOSE BLDC GLUCOMTR-MCNC: 119 MG/DL (ref 70–99)
HCT VFR BLD CALC: 23.7 % (ref 36–46.5)
HCT VFR BLD CALC: 24.9 % (ref 36–46.5)
HGB BLD-MCNC: 7.5 G/DL (ref 12–15.5)
HGB BLD-MCNC: 8 G/DL (ref 12–15.5)
HGB RETIC QN AUTO: 25 PG (ref 28.6–36.3)
IMM GRANULOCYTES # BLD AUTO: 0 K/MCL (ref 0–0.2)
IMM GRANULOCYTES # BLD: 1 %
IMM RETICS NFR: 37.6 % (ref 1.5–16)
INR PPP: 1.2
INTERVENTRICULAR SEPTUM IN END DIASTOLE (IVSD): 2.7
IRON SATN MFR SERPL: 6 % (ref 15–45)
IRON SERPL-MCNC: 15 MCG/DL (ref 50–170)
LEFT INTERNAL DIMENSION IN SYSTOLE (LVSD): 0.9
LEFT VENTRICULAR INTERNAL DIMENSION IN DIASTOLE (LVDD): 3.1
LEFT VENTRICULAR POSTERIOR WALL IN END DIASTOLE (LVPW): 5.3
LV EF: NORMAL %
LVOT 2D (LVOTD): 21.2
LVOT VTI (LVOTVTI): 1.1
LYMPHOCYTES # BLD: 1.5 K/MCL (ref 1–4.8)
LYMPHOCYTES NFR BLD: 39 %
MAGNESIUM SERPL-MCNC: 1.8 MG/DL (ref 1.7–2.4)
MCH RBC QN AUTO: 29 PG (ref 26–34)
MCH RBC QN AUTO: 29.9 PG (ref 26–34)
MCHC RBC AUTO-ENTMCNC: 31.6 G/DL (ref 32–36.5)
MCHC RBC AUTO-ENTMCNC: 32.1 G/DL (ref 32–36.5)
MCV RBC AUTO: 91.5 FL (ref 78–100)
MCV RBC AUTO: 92.9 FL (ref 78–100)
MONOCYTES # BLD: 0.3 K/MCL (ref 0.3–0.9)
MONOCYTES NFR BLD: 9 %
MV PEAK A VELOCITY (MVPAV): 149
MV PEAK E VELOCITY (MVPEV): 0.62
NEUTROPHILS # BLD: 1.9 K/MCL (ref 1.8–7.7)
NEUTROPHILS NFR BLD: 48 %
NRBC BLD MANUAL-RTO: 0 /100 WBC
NRBC BLD MANUAL-RTO: 0 /100 WBC
P AXIS (DEGREES): 22
PLATELET # BLD AUTO: 144 K/MCL (ref 140–450)
PLATELET # BLD AUTO: 155 K/MCL (ref 140–450)
PR-INTERVAL (MSEC): 160
PROTHROMBIN TIME: 13.1 SEC (ref 9.7–11.8)
QRS-INTERVAL (MSEC): 72
QT-INTERVAL (MSEC): 292
QTC: 423
R AXIS (DEGREES): 18
RAINBOW EXTRA TUBES HOLD SPECIMEN: NORMAL
RBC # BLD: 2.59 MIL/MCL (ref 4–5.2)
RBC # BLD: 2.68 MIL/MCL (ref 4–5.2)
REPORT TEXT: NORMAL
RETICS #: 62 K/MCL (ref 10–120)
RETICS/RBC NFR: 2.5 % (ref 0.3–2.5)
RV END SYSTOLIC LONGITUDINAL STRAIN FREE WALL (RVGS): 2.2
T AXIS (DEGREES): -8
TIBC SERPL-MCNC: 241 MCG/DL (ref 250–450)
TRICUSPID VALVE ANNULAR PEAK VELOCITY (TVAPV): 30
TRICUSPID VALVE PEAK REGURGITATION VELOCITY (TRPV): 3
VENTRICULAR RATE EKG/MIN (BPM): 126
WBC # BLD: 3.8 K/MCL (ref 4.2–11)
WBC # BLD: 4.9 K/MCL (ref 4.2–11)

## 2023-12-29 PROCEDURE — 10002800 HB RX 250 W HCPCS: Performed by: INTERNAL MEDICINE

## 2023-12-29 PROCEDURE — 10002807 HB RX 258: Performed by: INTERNAL MEDICINE

## 2023-12-29 PROCEDURE — 10004651 HB RX, NO CHARGE ITEM: Performed by: INTERNAL MEDICINE

## 2023-12-29 PROCEDURE — 85610 PROTHROMBIN TIME: CPT | Performed by: INTERNAL MEDICINE

## 2023-12-29 PROCEDURE — 93306 TTE W/DOPPLER COMPLETE: CPT | Performed by: INTERNAL MEDICINE

## 2023-12-29 PROCEDURE — 99255 IP/OBS CONSLTJ NEW/EST HI 80: CPT | Performed by: INTERNAL MEDICINE

## 2023-12-29 PROCEDURE — 93306 TTE W/DOPPLER COMPLETE: CPT

## 2023-12-29 PROCEDURE — 80048 BASIC METABOLIC PNL TOTAL CA: CPT | Performed by: INTERNAL MEDICINE

## 2023-12-29 PROCEDURE — 96372 THER/PROPH/DIAG INJ SC/IM: CPT | Performed by: INTERNAL MEDICINE

## 2023-12-29 PROCEDURE — 13003289 HB OXYGEN THERAPY DAILY

## 2023-12-29 PROCEDURE — 85730 THROMBOPLASTIN TIME PARTIAL: CPT | Performed by: INTERNAL MEDICINE

## 2023-12-29 PROCEDURE — 80202 ASSAY OF VANCOMYCIN: CPT | Performed by: INTERNAL MEDICINE

## 2023-12-29 PROCEDURE — 13003291 HB INS MIDLINE W/GUIDE INCL CATH

## 2023-12-29 PROCEDURE — 36415 COLL VENOUS BLD VENIPUNCTURE: CPT | Performed by: INTERNAL MEDICINE

## 2023-12-29 PROCEDURE — 10002803 HB RX 637: Performed by: INTERNAL MEDICINE

## 2023-12-29 PROCEDURE — 85046 RETICYTE/HGB CONCENTRATE: CPT | Performed by: INTERNAL MEDICINE

## 2023-12-29 PROCEDURE — 99233 SBSQ HOSP IP/OBS HIGH 50: CPT | Performed by: INTERNAL MEDICINE

## 2023-12-29 PROCEDURE — 10002805 HB CONTRAST AGENT: Performed by: INTERNAL MEDICINE

## 2023-12-29 PROCEDURE — 10002801 HB RX 250 W/O HCPCS: Performed by: INTERNAL MEDICINE

## 2023-12-29 PROCEDURE — 85025 COMPLETE CBC W/AUTO DIFF WBC: CPT | Performed by: INTERNAL MEDICINE

## 2023-12-29 PROCEDURE — 99291 CRITICAL CARE FIRST HOUR: CPT | Performed by: INTERNAL MEDICINE

## 2023-12-29 PROCEDURE — 85027 COMPLETE CBC AUTOMATED: CPT | Performed by: INTERNAL MEDICINE

## 2023-12-29 PROCEDURE — 10003585 HB ROOM CHARGE INTERMEDIATE CARE

## 2023-12-29 PROCEDURE — 83735 ASSAY OF MAGNESIUM: CPT | Performed by: INTERNAL MEDICINE

## 2023-12-29 PROCEDURE — 85384 FIBRINOGEN ACTIVITY: CPT | Performed by: INTERNAL MEDICINE

## 2023-12-29 PROCEDURE — 10004281 HB COUNTER-STAFF TIME PER 15 MIN

## 2023-12-29 RX ORDER — ENOXAPARIN SODIUM 150 MG/ML
1 INJECTION SUBCUTANEOUS EVERY 12 HOURS SCHEDULED
Status: DISCONTINUED | OUTPATIENT
Start: 2023-12-29 | End: 2024-01-02 | Stop reason: HOSPADM

## 2023-12-29 RX ORDER — 0.9 % SODIUM CHLORIDE 0.9 %
10 VIAL (ML) INJECTION EVERY 12 HOURS SCHEDULED
Status: DISCONTINUED | OUTPATIENT
Start: 2023-12-29 | End: 2024-01-02 | Stop reason: HOSPADM

## 2023-12-29 RX ORDER — 0.9 % SODIUM CHLORIDE 0.9 %
10 VIAL (ML) INJECTION PRN
Status: DISCONTINUED | OUTPATIENT
Start: 2023-12-29 | End: 2024-01-02 | Stop reason: HOSPADM

## 2023-12-29 RX ADMIN — SODIUM CHLORIDE 500 ML: 9 INJECTION, SOLUTION INTRAVENOUS at 17:28

## 2023-12-29 RX ADMIN — SODIUM CHLORIDE, PRESERVATIVE FREE 10 ML: 5 INJECTION INTRAVENOUS at 20:45

## 2023-12-29 RX ADMIN — DIAZEPAM 5 MG: 5 TABLET ORAL at 13:27

## 2023-12-29 RX ADMIN — OXYCODONE HYDROCHLORIDE 5 MG: 5 TABLET ORAL at 07:30

## 2023-12-29 RX ADMIN — SODIUM CHLORIDE, PRESERVATIVE FREE 2 ML: 5 INJECTION INTRAVENOUS at 20:46

## 2023-12-29 RX ADMIN — DIAZEPAM 5 MG: 5 TABLET ORAL at 21:17

## 2023-12-29 RX ADMIN — ACETAMINOPHEN 650 MG: 325 TABLET ORAL at 23:42

## 2023-12-29 RX ADMIN — ACETAMINOPHEN 650 MG: 325 TABLET ORAL at 00:20

## 2023-12-29 RX ADMIN — SILVER SULFADIAZINE: 10 CREAM TOPICAL at 08:38

## 2023-12-29 RX ADMIN — SILVER SULFADIAZINE: 10 CREAM TOPICAL at 20:46

## 2023-12-29 RX ADMIN — SODIUM CHLORIDE, PRESERVATIVE FREE 2 ML: 5 INJECTION INTRAVENOUS at 08:34

## 2023-12-29 RX ADMIN — MEROPENEM 500 MG: 500 INJECTION INTRAVENOUS at 11:11

## 2023-12-29 RX ADMIN — MEROPENEM 500 MG: 500 INJECTION INTRAVENOUS at 23:40

## 2023-12-29 RX ADMIN — VANCOMYCIN HYDROCHLORIDE 1750 MG: 10 INJECTION, POWDER, LYOPHILIZED, FOR SOLUTION INTRAVENOUS at 12:07

## 2023-12-29 RX ADMIN — OXYCODONE HYDROCHLORIDE 5 MG: 5 TABLET ORAL at 17:17

## 2023-12-29 RX ADMIN — PERFLUTREN 2 ML: 6.52 INJECTION, SUSPENSION INTRAVENOUS at 08:05

## 2023-12-29 RX ADMIN — MEROPENEM 500 MG: 500 INJECTION INTRAVENOUS at 17:32

## 2023-12-29 RX ADMIN — MEROPENEM 500 MG: 500 INJECTION INTRAVENOUS at 05:52

## 2023-12-29 RX ADMIN — OXYCODONE HYDROCHLORIDE 5 MG: 5 TABLET ORAL at 00:40

## 2023-12-29 RX ADMIN — ENOXAPARIN SODIUM 120 MG: 120 INJECTION SUBCUTANEOUS at 17:18

## 2023-12-29 ASSESSMENT — ENCOUNTER SYMPTOMS
NAUSEA: 0
ACTIVITY CHANGE: 0
APPETITE CHANGE: 0
ABDOMINAL PAIN: 0
TROUBLE SWALLOWING: 0
STRIDOR: 0
SORE THROAT: 0
FATIGUE: 0
COUGH: 1
DIARRHEA: 0
SHORTNESS OF BREATH: 0
CONSTIPATION: 0
EYE REDNESS: 0
NUMBNESS: 0
VOMITING: 0
CHEST TIGHTNESS: 0
CHILLS: 0
HEADACHES: 0
VOICE CHANGE: 0
ADENOPATHY: 0
BRUISES/BLEEDS EASILY: 0
FEVER: 1
WHEEZING: 0
EYE PAIN: 0
WEAKNESS: 1
ABDOMINAL DISTENTION: 0

## 2023-12-29 ASSESSMENT — PAIN SCALES - GENERAL
PAINLEVEL_OUTOF10: 3
PAINLEVEL_OUTOF10: 4
PAINLEVEL_OUTOF10: 6
PAINLEVEL_OUTOF10: 4
PAINLEVEL_OUTOF10: 6
PAINLEVEL_OUTOF10: 3
PAINLEVEL_OUTOF10: 7
PAINLEVEL_OUTOF10: 3
PAINLEVEL_OUTOF10: 3
PAINLEVEL_OUTOF10: 0

## 2023-12-30 VITALS
DIASTOLIC BLOOD PRESSURE: 66 MMHG | HEART RATE: 95 BPM | OXYGEN SATURATION: 90 % | WEIGHT: 277.78 LBS | RESPIRATION RATE: 18 BRPM | BODY MASS INDEX: 41.14 KG/M2 | HEIGHT: 69 IN | TEMPERATURE: 99.7 F | SYSTOLIC BLOOD PRESSURE: 100 MMHG

## 2023-12-30 LAB
ANION GAP SERPL CALC-SCNC: 8 MMOL/L (ref 7–19)
APTT PPP: 28 SEC (ref 22–32)
BACTERIA BLD CULT: NORMAL
BACTERIA BLD CULT: NORMAL
BUN SERPL-MCNC: 8 MG/DL (ref 6–20)
BUN/CREAT SERPL: 13 (ref 7–25)
CALCIUM SERPL-MCNC: 7.6 MG/DL (ref 8.4–10.2)
CHLORIDE SERPL-SCNC: 108 MMOL/L (ref 97–110)
CO2 SERPL-SCNC: 29 MMOL/L (ref 21–32)
CREAT SERPL-MCNC: 0.6 MG/DL (ref 0.51–0.95)
DEPRECATED RDW RBC: 46.2 FL (ref 39–50)
EGFRCR SERPLBLD CKD-EPI 2021: >90 ML/MIN/{1.73_M2}
ERYTHROCYTE [DISTWIDTH] IN BLOOD: 13.7 % (ref 11–15)
FASTING DURATION TIME PATIENT: ABNORMAL H
GLUCOSE SERPL-MCNC: 116 MG/DL (ref 70–99)
HCT VFR BLD CALC: 24.2 % (ref 36–46.5)
HGB BLD-MCNC: 7.7 G/DL (ref 12–15.5)
IGG SERPL-MCNC: 538 MG/DL (ref 700–1600)
MCH RBC QN AUTO: 29.4 PG (ref 26–34)
MCHC RBC AUTO-ENTMCNC: 31.8 G/DL (ref 32–36.5)
MCV RBC AUTO: 92.4 FL (ref 78–100)
NRBC BLD MANUAL-RTO: 0 /100 WBC
PLATELET # BLD AUTO: 151 K/MCL (ref 140–450)
POTASSIUM SERPL-SCNC: 4.1 MMOL/L (ref 3.4–5.1)
RBC # BLD: 2.62 MIL/MCL (ref 4–5.2)
SODIUM SERPL-SCNC: 141 MMOL/L (ref 135–145)
VANCOMYCIN TROUGH SERPL-MCNC: 10.3 MCG/ML (ref 10–20)
WBC # BLD: 3.9 K/MCL (ref 4.2–11)

## 2023-12-30 PROCEDURE — 82784 ASSAY IGA/IGD/IGG/IGM EACH: CPT | Performed by: INTERNAL MEDICINE

## 2023-12-30 PROCEDURE — 10002807 HB RX 258: Performed by: INTERNAL MEDICINE

## 2023-12-30 PROCEDURE — 99233 SBSQ HOSP IP/OBS HIGH 50: CPT | Performed by: INTERNAL MEDICINE

## 2023-12-30 PROCEDURE — 10004651 HB RX, NO CHARGE ITEM: Performed by: INTERNAL MEDICINE

## 2023-12-30 PROCEDURE — 10002800 HB RX 250 W HCPCS: Performed by: INTERNAL MEDICINE

## 2023-12-30 PROCEDURE — 10002800 HB RX 250 W HCPCS

## 2023-12-30 PROCEDURE — 13003289 HB OXYGEN THERAPY DAILY

## 2023-12-30 PROCEDURE — 96372 THER/PROPH/DIAG INJ SC/IM: CPT | Performed by: SPECIALIST

## 2023-12-30 PROCEDURE — 85730 THROMBOPLASTIN TIME PARTIAL: CPT | Performed by: INTERNAL MEDICINE

## 2023-12-30 PROCEDURE — 10002801 HB RX 250 W/O HCPCS: Performed by: INTERNAL MEDICINE

## 2023-12-30 PROCEDURE — 10002800 HB RX 250 W HCPCS: Performed by: SPECIALIST

## 2023-12-30 PROCEDURE — 10006031 HB ROOM CHARGE TELEMETRY

## 2023-12-30 PROCEDURE — 10002803 HB RX 637: Performed by: INTERNAL MEDICINE

## 2023-12-30 PROCEDURE — 85027 COMPLETE CBC AUTOMATED: CPT | Performed by: INTERNAL MEDICINE

## 2023-12-30 RX ORDER — CEFAZOLIN SODIUM/WATER 2 G/20 ML
2000 SYRINGE (ML) INTRAVENOUS DAILY
Status: DISCONTINUED | OUTPATIENT
Start: 2023-12-30 | End: 2024-01-02 | Stop reason: HOSPADM

## 2023-12-30 RX ADMIN — DIAZEPAM 5 MG: 5 TABLET ORAL at 17:49

## 2023-12-30 RX ADMIN — ENOXAPARIN SODIUM 120 MG: 120 INJECTION SUBCUTANEOUS at 17:29

## 2023-12-30 RX ADMIN — VANCOMYCIN HYDROCHLORIDE 1750 MG: 10 INJECTION, POWDER, LYOPHILIZED, FOR SOLUTION INTRAVENOUS at 01:35

## 2023-12-30 RX ADMIN — OXYCODONE HYDROCHLORIDE 5 MG: 5 TABLET ORAL at 12:55

## 2023-12-30 RX ADMIN — VANCOMYCIN HYDROCHLORIDE 1750 MG: 10 INJECTION, POWDER, LYOPHILIZED, FOR SOLUTION INTRAVENOUS at 11:20

## 2023-12-30 RX ADMIN — SODIUM CHLORIDE, PRESERVATIVE FREE 10 ML: 5 INJECTION INTRAVENOUS at 20:55

## 2023-12-30 RX ADMIN — CEFTRIAXONE SODIUM 2000 MG: 10 INJECTION, POWDER, FOR SOLUTION INTRAVENOUS at 11:12

## 2023-12-30 RX ADMIN — OXYCODONE HYDROCHLORIDE 5 MG: 5 TABLET ORAL at 20:54

## 2023-12-30 RX ADMIN — ENOXAPARIN SODIUM 120 MG: 120 INJECTION SUBCUTANEOUS at 04:48

## 2023-12-30 RX ADMIN — VANCOMYCIN HYDROCHLORIDE 1750 MG: 10 INJECTION, POWDER, LYOPHILIZED, FOR SOLUTION INTRAVENOUS at 23:40

## 2023-12-30 RX ADMIN — SILVER SULFADIAZINE: 10 CREAM TOPICAL at 22:23

## 2023-12-30 RX ADMIN — OXYCODONE HYDROCHLORIDE 5 MG: 5 TABLET ORAL at 03:50

## 2023-12-30 RX ADMIN — SODIUM CHLORIDE, PRESERVATIVE FREE 10 ML: 5 INJECTION INTRAVENOUS at 09:41

## 2023-12-30 RX ADMIN — ONDANSETRON 4 MG: 2 INJECTION INTRAMUSCULAR; INTRAVENOUS at 22:39

## 2023-12-30 RX ADMIN — SODIUM CHLORIDE, PRESERVATIVE FREE 2 ML: 5 INJECTION INTRAVENOUS at 20:56

## 2023-12-30 RX ADMIN — SODIUM CHLORIDE, PRESERVATIVE FREE 2 ML: 5 INJECTION INTRAVENOUS at 09:41

## 2023-12-30 RX ADMIN — MEROPENEM 500 MG: 500 INJECTION INTRAVENOUS at 05:03

## 2023-12-30 RX ADMIN — ACETAMINOPHEN 650 MG: 325 TABLET ORAL at 09:40

## 2023-12-30 RX ADMIN — IRON SUCROSE 200 MG: 20 INJECTION, SOLUTION INTRAVENOUS at 11:14

## 2023-12-30 RX ADMIN — SILVER SULFADIAZINE: 10 CREAM TOPICAL at 09:41

## 2023-12-30 RX ADMIN — DIAZEPAM 5 MG: 5 TABLET ORAL at 09:40

## 2023-12-30 ASSESSMENT — PAIN SCALES - GENERAL
PAINLEVEL_OUTOF10: 0
PAINLEVEL_OUTOF10: 9
PAINLEVEL_OUTOF10: 3
PAINLEVEL_OUTOF10: 2
PAINLEVEL_OUTOF10: 3
PAINLEVEL_OUTOF10: 6

## 2023-12-31 ENCOUNTER — APPOINTMENT (OUTPATIENT)
Dept: CARDIOLOGY | Age: 23
DRG: 862 | End: 2023-12-31
Attending: INTERNAL MEDICINE

## 2023-12-31 LAB
ANION GAP SERPL CALC-SCNC: 7 MMOL/L (ref 7–19)
ASCENDING AORTA (AAD): 3
AV STENOSIS SEVERITY TEXT: NORMAL
AVI LVOT PEAK GRADIENT (LVOTMG): 0.9
BUN SERPL-MCNC: 4 MG/DL (ref 6–20)
BUN/CREAT SERPL: 8 (ref 7–25)
CALCIUM SERPL-MCNC: 8 MG/DL (ref 8.4–10.2)
CHLORIDE SERPL-SCNC: 108 MMOL/L (ref 97–110)
CO2 SERPL-SCNC: 28 MMOL/L (ref 21–32)
CREAT SERPL-MCNC: 0.53 MG/DL (ref 0.51–0.95)
DEPRECATED RDW RBC: 46.4 FL (ref 39–50)
EGFRCR SERPLBLD CKD-EPI 2021: >90 ML/MIN/{1.73_M2}
EOSINOPHIL # BLD: 0.1 K/MCL (ref 0–0.5)
EOSINOPHIL NFR BLD: 2 %
ERYTHROCYTE [DISTWIDTH] IN BLOOD: 13.6 % (ref 11–15)
FASTING DURATION TIME PATIENT: ABNORMAL H
GLUCOSE SERPL-MCNC: 102 MG/DL (ref 70–99)
HCT VFR BLD CALC: 24.1 % (ref 36–46.5)
HGB BLD-MCNC: 7.6 G/DL (ref 12–15.5)
LEFT INTERNAL DIMENSION IN SYSTOLE (LVSD): 1.1
LEFT VENTRICULAR INTERNAL DIMENSION IN DIASTOLE (LVDD): 2.7
LEFT VENTRICULAR POSTERIOR WALL IN END DIASTOLE (LVPW): 4.7
LV EF: NORMAL %
LYMPHOCYTES # BLD: 1.9 K/MCL (ref 1–4.8)
LYMPHOCYTES NFR BLD: 43 %
MCH RBC QN AUTO: 29.1 PG (ref 26–34)
MCHC RBC AUTO-ENTMCNC: 31.5 G/DL (ref 32–36.5)
MCV RBC AUTO: 92.3 FL (ref 78–100)
MONOCYTES # BLD: 0.5 K/MCL (ref 0.3–0.9)
MONOCYTES NFR BLD: 13 %
NEUTROPHILS # BLD: 1.7 K/MCL (ref 1.8–7.7)
NEUTS BAND NFR BLD: 1 % (ref 0–10)
NEUTS SEG NFR BLD: 39 %
NRBC BLD MANUAL-RTO: 0 /100 WBC
PLAT MORPH BLD: NORMAL
PLATELET # BLD AUTO: 154 K/MCL (ref 140–450)
POTASSIUM SERPL-SCNC: 3.7 MMOL/L (ref 3.4–5.1)
RBC # BLD: 2.61 MIL/MCL (ref 4–5.2)
RBC MORPH BLD: NORMAL
RV END SYSTOLIC LONGITUDINAL STRAIN FREE WALL (RVGS): 2.1
SODIUM SERPL-SCNC: 139 MMOL/L (ref 135–145)
VARIANT LYMPHS NFR BLD: 2 % (ref 0–5)
WBC # BLD: 4.2 K/MCL (ref 4.2–11)
WBC MORPH BLD: NORMAL

## 2023-12-31 PROCEDURE — 10006031 HB ROOM CHARGE TELEMETRY

## 2023-12-31 PROCEDURE — 93308 TTE F-UP OR LMTD: CPT | Performed by: INTERNAL MEDICINE

## 2023-12-31 PROCEDURE — 99233 SBSQ HOSP IP/OBS HIGH 50: CPT | Performed by: INTERNAL MEDICINE

## 2023-12-31 PROCEDURE — 10002800 HB RX 250 W HCPCS

## 2023-12-31 PROCEDURE — 10002800 HB RX 250 W HCPCS: Performed by: INTERNAL MEDICINE

## 2023-12-31 PROCEDURE — 93308 TTE F-UP OR LMTD: CPT

## 2023-12-31 PROCEDURE — 93321 DOPPLER ECHO F-UP/LMTD STD: CPT | Performed by: INTERNAL MEDICINE

## 2023-12-31 PROCEDURE — 80048 BASIC METABOLIC PNL TOTAL CA: CPT | Performed by: INTERNAL MEDICINE

## 2023-12-31 PROCEDURE — 10004651 HB RX, NO CHARGE ITEM: Performed by: INTERNAL MEDICINE

## 2023-12-31 PROCEDURE — 10002807 HB RX 258: Performed by: INTERNAL MEDICINE

## 2023-12-31 PROCEDURE — 10002803 HB RX 637: Performed by: INTERNAL MEDICINE

## 2023-12-31 PROCEDURE — 10002803 HB RX 637: Performed by: SPECIALIST

## 2023-12-31 PROCEDURE — 99233 SBSQ HOSP IP/OBS HIGH 50: CPT | Performed by: NURSE PRACTITIONER

## 2023-12-31 PROCEDURE — 96372 THER/PROPH/DIAG INJ SC/IM: CPT | Performed by: SPECIALIST

## 2023-12-31 PROCEDURE — 85027 COMPLETE CBC AUTOMATED: CPT | Performed by: INTERNAL MEDICINE

## 2023-12-31 PROCEDURE — 93325 DOPPLER ECHO COLOR FLOW MAPG: CPT | Performed by: INTERNAL MEDICINE

## 2023-12-31 PROCEDURE — 10002801 HB RX 250 W/O HCPCS: Performed by: INTERNAL MEDICINE

## 2023-12-31 PROCEDURE — 10002800 HB RX 250 W HCPCS: Performed by: SPECIALIST

## 2023-12-31 RX ORDER — ONDANSETRON 4 MG/1
4 TABLET, FILM COATED ORAL EVERY 8 HOURS PRN
Qty: 10 TABLET | Refills: 1 | Status: SHIPPED | OUTPATIENT
Start: 2023-12-31

## 2023-12-31 RX ORDER — POLYETHYLENE GLYCOL 3350 17 G/17G
17 POWDER, FOR SOLUTION ORAL DAILY PRN
Qty: 20 EACH | Refills: 0 | Status: SHIPPED | OUTPATIENT
Start: 2023-12-31

## 2023-12-31 RX ORDER — DIAZEPAM 5 MG/1
5 TABLET ORAL EVERY 6 HOURS PRN
Qty: 20 TABLET | Refills: 0 | Status: SHIPPED | OUTPATIENT
Start: 2023-12-31

## 2023-12-31 RX ORDER — GABAPENTIN 300 MG/1
300 CAPSULE ORAL 3 TIMES DAILY
Qty: 30 CAPSULE | Refills: 0 | Status: SHIPPED | OUTPATIENT
Start: 2023-12-31 | End: 2024-01-10

## 2023-12-31 RX ORDER — OXYCODONE HYDROCHLORIDE 5 MG/1
5 TABLET ORAL EVERY 6 HOURS PRN
Qty: 24 TABLET | Refills: 0 | Status: SHIPPED | OUTPATIENT
Start: 2023-12-31

## 2023-12-31 RX ADMIN — IRON SUCROSE 200 MG: 20 INJECTION, SOLUTION INTRAVENOUS at 13:05

## 2023-12-31 RX ADMIN — VANCOMYCIN HYDROCHLORIDE 1750 MG: 10 INJECTION, POWDER, LYOPHILIZED, FOR SOLUTION INTRAVENOUS at 22:54

## 2023-12-31 RX ADMIN — ENOXAPARIN SODIUM 120 MG: 120 INJECTION SUBCUTANEOUS at 18:13

## 2023-12-31 RX ADMIN — SODIUM CHLORIDE, PRESERVATIVE FREE 10 ML: 5 INJECTION INTRAVENOUS at 08:44

## 2023-12-31 RX ADMIN — SODIUM CHLORIDE, PRESERVATIVE FREE 2 ML: 5 INJECTION INTRAVENOUS at 21:03

## 2023-12-31 RX ADMIN — ENOXAPARIN SODIUM 120 MG: 120 INJECTION SUBCUTANEOUS at 06:11

## 2023-12-31 RX ADMIN — ACETAMINOPHEN 650 MG: 325 TABLET ORAL at 15:21

## 2023-12-31 RX ADMIN — OXYCODONE HYDROCHLORIDE 5 MG: 5 TABLET ORAL at 11:06

## 2023-12-31 RX ADMIN — SODIUM CHLORIDE 25 ML: 9 INJECTION, SOLUTION INTRAVENOUS at 13:52

## 2023-12-31 RX ADMIN — CEFTRIAXONE SODIUM 2000 MG: 10 INJECTION, POWDER, FOR SOLUTION INTRAVENOUS at 09:58

## 2023-12-31 RX ADMIN — SILVER SULFADIAZINE: 10 CREAM TOPICAL at 10:00

## 2023-12-31 RX ADMIN — SODIUM CHLORIDE, PRESERVATIVE FREE 10 ML: 5 INJECTION INTRAVENOUS at 21:03

## 2023-12-31 RX ADMIN — SODIUM CHLORIDE, PRESERVATIVE FREE 2 ML: 5 INJECTION INTRAVENOUS at 08:44

## 2023-12-31 RX ADMIN — SILVER SULFADIAZINE: 10 CREAM TOPICAL at 22:43

## 2023-12-31 RX ADMIN — OXYCODONE HYDROCHLORIDE 5 MG: 5 TABLET ORAL at 21:02

## 2023-12-31 RX ADMIN — VANCOMYCIN HYDROCHLORIDE 1750 MG: 10 INJECTION, POWDER, LYOPHILIZED, FOR SOLUTION INTRAVENOUS at 12:56

## 2023-12-31 ASSESSMENT — PAIN SCALES - GENERAL
PAINLEVEL_OUTOF10: 4
PAINLEVEL_OUTOF10: 3
PAINLEVEL_OUTOF10: 5

## 2024-01-01 LAB
BACTERIA BLD CULT: NORMAL
BACTERIA BLD CULT: NORMAL
BUN SERPL-MCNC: 4 MG/DL (ref 6–20)
BUN/CREAT SERPL: 8 (ref 7–25)
CREAT SERPL-MCNC: 0.53 MG/DL (ref 0.51–0.95)
DEPRECATED RDW RBC: 46.9 FL (ref 39–50)
EGFRCR SERPLBLD CKD-EPI 2021: >90 ML/MIN/{1.73_M2}
EOSINOPHIL # BLD: 0 K/MCL (ref 0–0.5)
EOSINOPHIL NFR BLD: 1 %
ERYTHROCYTE [DISTWIDTH] IN BLOOD: 13.9 % (ref 11–15)
HCT VFR BLD CALC: 25.4 % (ref 36–46.5)
HGB BLD-MCNC: 8 G/DL (ref 12–15.5)
LYMPHOCYTES # BLD: 2.1 K/MCL (ref 1–4.8)
LYMPHOCYTES NFR BLD: 40 %
MCH RBC QN AUTO: 29.5 PG (ref 26–34)
MCHC RBC AUTO-ENTMCNC: 31.5 G/DL (ref 32–36.5)
MCV RBC AUTO: 93.7 FL (ref 78–100)
MONOCYTES # BLD: 0.6 K/MCL (ref 0.3–0.9)
MONOCYTES NFR BLD: 13 %
NEUTROPHILS # BLD: 2.2 K/MCL (ref 1.8–7.7)
NEUTS SEG NFR BLD: 44 %
NRBC BLD MANUAL-RTO: 0 /100 WBC
PLAT MORPH BLD: NORMAL
PLATELET # BLD AUTO: 155 K/MCL (ref 140–450)
POLYCHROMASIA BLD QL SMEAR: NORMAL
RBC # BLD: 2.71 MIL/MCL (ref 4–5.2)
SMEAR SPEC: NORMAL
VARIANT LYMPHS NFR BLD: 2 % (ref 0–5)
WBC # BLD: 4.9 K/MCL (ref 4.2–11)

## 2024-01-01 PROCEDURE — 82565 ASSAY OF CREATININE: CPT | Performed by: INTERNAL MEDICINE

## 2024-01-01 PROCEDURE — 99232 SBSQ HOSP IP/OBS MODERATE 35: CPT | Performed by: INTERNAL MEDICINE

## 2024-01-01 PROCEDURE — 10002800 HB RX 250 W HCPCS

## 2024-01-01 PROCEDURE — 10002800 HB RX 250 W HCPCS: Performed by: SPECIALIST

## 2024-01-01 PROCEDURE — 99233 SBSQ HOSP IP/OBS HIGH 50: CPT | Performed by: INTERNAL MEDICINE

## 2024-01-01 PROCEDURE — 10002800 HB RX 250 W HCPCS: Performed by: INTERNAL MEDICINE

## 2024-01-01 PROCEDURE — 10004651 HB RX, NO CHARGE ITEM: Performed by: INTERNAL MEDICINE

## 2024-01-01 PROCEDURE — 10006031 HB ROOM CHARGE TELEMETRY

## 2024-01-01 PROCEDURE — 85027 COMPLETE CBC AUTOMATED: CPT | Performed by: INTERNAL MEDICINE

## 2024-01-01 PROCEDURE — 96372 THER/PROPH/DIAG INJ SC/IM: CPT | Performed by: SPECIALIST

## 2024-01-01 PROCEDURE — 10002807 HB RX 258: Performed by: INTERNAL MEDICINE

## 2024-01-01 PROCEDURE — 10002803 HB RX 637: Performed by: INTERNAL MEDICINE

## 2024-01-01 PROCEDURE — 84520 ASSAY OF UREA NITROGEN: CPT | Performed by: INTERNAL MEDICINE

## 2024-01-01 PROCEDURE — 10002801 HB RX 250 W/O HCPCS: Performed by: INTERNAL MEDICINE

## 2024-01-01 RX ADMIN — ONDANSETRON 4 MG: 2 INJECTION INTRAMUSCULAR; INTRAVENOUS at 08:59

## 2024-01-01 RX ADMIN — OXYCODONE HYDROCHLORIDE 5 MG: 5 TABLET ORAL at 09:10

## 2024-01-01 RX ADMIN — SODIUM CHLORIDE, PRESERVATIVE FREE 10 ML: 5 INJECTION INTRAVENOUS at 09:00

## 2024-01-01 RX ADMIN — ENOXAPARIN SODIUM 120 MG: 120 INJECTION SUBCUTANEOUS at 07:14

## 2024-01-01 RX ADMIN — SODIUM CHLORIDE, PRESERVATIVE FREE 2 ML: 5 INJECTION INTRAVENOUS at 21:17

## 2024-01-01 RX ADMIN — VANCOMYCIN HYDROCHLORIDE 1750 MG: 10 INJECTION, POWDER, LYOPHILIZED, FOR SOLUTION INTRAVENOUS at 23:41

## 2024-01-01 RX ADMIN — OXYCODONE HYDROCHLORIDE 5 MG: 5 TABLET ORAL at 21:10

## 2024-01-01 RX ADMIN — ENOXAPARIN SODIUM 120 MG: 120 INJECTION SUBCUTANEOUS at 17:13

## 2024-01-01 RX ADMIN — SILVER SULFADIAZINE: 10 CREAM TOPICAL at 21:12

## 2024-01-01 RX ADMIN — SODIUM CHLORIDE, PRESERVATIVE FREE 10 ML: 5 INJECTION INTRAVENOUS at 21:14

## 2024-01-01 RX ADMIN — DIAZEPAM 5 MG: 5 TABLET ORAL at 21:10

## 2024-01-01 RX ADMIN — VANCOMYCIN HYDROCHLORIDE 1750 MG: 10 INJECTION, POWDER, LYOPHILIZED, FOR SOLUTION INTRAVENOUS at 11:39

## 2024-01-01 RX ADMIN — SILVER SULFADIAZINE: 10 CREAM TOPICAL at 09:00

## 2024-01-01 RX ADMIN — CEFTRIAXONE SODIUM 2000 MG: 10 INJECTION, POWDER, FOR SOLUTION INTRAVENOUS at 09:00

## 2024-01-01 ASSESSMENT — PAIN SCALES - GENERAL
PAINLEVEL_OUTOF10: 2
PAINLEVEL_OUTOF10: 9

## 2024-01-01 ASSESSMENT — PAIN SCALES - WONG BAKER: WONGBAKER_NUMERICALRESPONSE: 0

## 2024-01-02 VITALS
TEMPERATURE: 98.2 F | HEART RATE: 90 BPM | WEIGHT: 277.78 LBS | BODY MASS INDEX: 41.14 KG/M2 | RESPIRATION RATE: 16 BRPM | HEIGHT: 69 IN | OXYGEN SATURATION: 91 % | DIASTOLIC BLOOD PRESSURE: 66 MMHG | SYSTOLIC BLOOD PRESSURE: 95 MMHG

## 2024-01-02 LAB
ANION GAP SERPL CALC-SCNC: 8 MMOL/L (ref 7–19)
BUN SERPL-MCNC: 3 MG/DL (ref 6–20)
BUN/CREAT SERPL: 5 (ref 7–25)
CALCIUM SERPL-MCNC: 8.1 MG/DL (ref 8.4–10.2)
CHLORIDE SERPL-SCNC: 107 MMOL/L (ref 97–110)
CO2 SERPL-SCNC: 30 MMOL/L (ref 21–32)
CREAT SERPL-MCNC: 0.55 MG/DL (ref 0.51–0.95)
DEPRECATED RDW RBC: 48.2 FL (ref 39–50)
EGFRCR SERPLBLD CKD-EPI 2021: >90 ML/MIN/{1.73_M2}
EOSINOPHIL # BLD: 0.1 K/MCL (ref 0–0.5)
EOSINOPHIL NFR BLD: 1 %
ERYTHROCYTE [DISTWIDTH] IN BLOOD: 14.3 % (ref 11–15)
FASTING DURATION TIME PATIENT: ABNORMAL H
GLUCOSE SERPL-MCNC: 96 MG/DL (ref 70–99)
HCT VFR BLD CALC: 25.3 % (ref 36–46.5)
HGB BLD-MCNC: 7.8 G/DL (ref 12–15.5)
LYMPHOCYTES # BLD: 3.5 K/MCL (ref 1–4.8)
LYMPHOCYTES NFR BLD: 63 %
MCH RBC QN AUTO: 29.1 PG (ref 26–34)
MCHC RBC AUTO-ENTMCNC: 30.8 G/DL (ref 32–36.5)
MCV RBC AUTO: 94.4 FL (ref 78–100)
MONOCYTES # BLD: 0.4 K/MCL (ref 0.3–0.9)
MONOCYTES NFR BLD: 7 %
NEUTROPHILS # BLD: 1.6 K/MCL (ref 1.8–7.7)
NEUTS SEG NFR BLD: 29 %
NRBC BLD MANUAL-RTO: 0 /100 WBC
PLAT MORPH BLD: NORMAL
PLATELET # BLD AUTO: 143 K/MCL (ref 140–450)
POLYCHROMASIA BLD QL SMEAR: ABNORMAL
POTASSIUM SERPL-SCNC: 3.7 MMOL/L (ref 3.4–5.1)
RBC # BLD: 2.68 MIL/MCL (ref 4–5.2)
SMEAR SPEC: ABNORMAL
SODIUM SERPL-SCNC: 141 MMOL/L (ref 135–145)
WBC # BLD: 5.5 K/MCL (ref 4.2–11)

## 2024-01-02 PROCEDURE — 10002800 HB RX 250 W HCPCS: Performed by: INTERNAL MEDICINE

## 2024-01-02 PROCEDURE — 85027 COMPLETE CBC AUTOMATED: CPT | Performed by: INTERNAL MEDICINE

## 2024-01-02 PROCEDURE — 10002803 HB RX 637: Performed by: INTERNAL MEDICINE

## 2024-01-02 PROCEDURE — 10002801 HB RX 250 W/O HCPCS: Performed by: INTERNAL MEDICINE

## 2024-01-02 PROCEDURE — 10002807 HB RX 258: Performed by: INTERNAL MEDICINE

## 2024-01-02 PROCEDURE — 10002800 HB RX 250 W HCPCS

## 2024-01-02 PROCEDURE — 80048 BASIC METABOLIC PNL TOTAL CA: CPT | Performed by: INTERNAL MEDICINE

## 2024-01-02 PROCEDURE — 96372 THER/PROPH/DIAG INJ SC/IM: CPT | Performed by: SPECIALIST

## 2024-01-02 PROCEDURE — 10004651 HB RX, NO CHARGE ITEM: Performed by: INTERNAL MEDICINE

## 2024-01-02 PROCEDURE — 99233 SBSQ HOSP IP/OBS HIGH 50: CPT | Performed by: INTERNAL MEDICINE

## 2024-01-02 PROCEDURE — 99239 HOSP IP/OBS DSCHRG MGMT >30: CPT | Performed by: INTERNAL MEDICINE

## 2024-01-02 PROCEDURE — 10002800 HB RX 250 W HCPCS: Performed by: SPECIALIST

## 2024-01-02 RX ORDER — CEPHALEXIN 500 MG/1
1000 CAPSULE ORAL 3 TIMES DAILY
Qty: 42 CAPSULE | Refills: 0 | Status: SHIPPED | OUTPATIENT
Start: 2024-01-02 | End: 2024-01-10 | Stop reason: ALTCHOICE

## 2024-01-02 RX ORDER — FUROSEMIDE 10 MG/ML
40 INJECTION INTRAMUSCULAR; INTRAVENOUS ONCE
Status: DISCONTINUED | OUTPATIENT
Start: 2024-01-02 | End: 2024-01-02

## 2024-01-02 RX ORDER — FUROSEMIDE 10 MG/ML
20 INJECTION INTRAMUSCULAR; INTRAVENOUS ONCE
Status: COMPLETED | OUTPATIENT
Start: 2024-01-02 | End: 2024-01-02

## 2024-01-02 RX ADMIN — SODIUM CHLORIDE, PRESERVATIVE FREE 2 ML: 5 INJECTION INTRAVENOUS at 14:07

## 2024-01-02 RX ADMIN — ENOXAPARIN SODIUM 120 MG: 120 INJECTION SUBCUTANEOUS at 06:55

## 2024-01-02 RX ADMIN — SILVER SULFADIAZINE: 10 CREAM TOPICAL at 10:05

## 2024-01-02 RX ADMIN — VANCOMYCIN HYDROCHLORIDE 1750 MG: 10 INJECTION, POWDER, LYOPHILIZED, FOR SOLUTION INTRAVENOUS at 13:04

## 2024-01-02 RX ADMIN — OXYCODONE HYDROCHLORIDE 5 MG: 5 TABLET ORAL at 10:12

## 2024-01-02 RX ADMIN — FUROSEMIDE 20 MG: 10 INJECTION, SOLUTION INTRAMUSCULAR; INTRAVENOUS at 14:06

## 2024-01-02 RX ADMIN — SODIUM CHLORIDE, PRESERVATIVE FREE 10 ML: 5 INJECTION INTRAVENOUS at 14:07

## 2024-01-02 RX ADMIN — CEFTRIAXONE SODIUM 2000 MG: 10 INJECTION, POWDER, FOR SOLUTION INTRAVENOUS at 10:05

## 2024-01-02 ASSESSMENT — PAIN SCALES - GENERAL
PAINLEVEL_OUTOF10: 7
PAINLEVEL_OUTOF10: 1

## 2024-01-05 DIAGNOSIS — S31.109A OPEN WOUND OF ABDOMINAL WALL, INITIAL ENCOUNTER: Primary | ICD-10-CM

## 2024-01-09 ENCOUNTER — TELEPHONE (OUTPATIENT)
Dept: HEMATOLOGY/ONCOLOGY | Age: 24
End: 2024-01-09

## 2024-01-10 ENCOUNTER — MOBILE (OUTPATIENT)
Dept: SURGERY | Age: 24
End: 2024-01-10

## 2024-01-10 RX ORDER — ONDANSETRON 4 MG/1
4 TABLET, FILM COATED ORAL EVERY 8 HOURS PRN
Qty: 8 TABLET | Refills: 0 | Status: SHIPPED | OUTPATIENT
Start: 2024-01-10

## 2024-01-15 ENCOUNTER — OFFICE VISIT (OUTPATIENT)
Dept: CARDIOLOGY | Age: 24
End: 2024-01-15

## 2024-01-15 VITALS
HEIGHT: 69 IN | SYSTOLIC BLOOD PRESSURE: 84 MMHG | BODY MASS INDEX: 35.55 KG/M2 | WEIGHT: 240 LBS | DIASTOLIC BLOOD PRESSURE: 62 MMHG | OXYGEN SATURATION: 99 %

## 2024-01-15 DIAGNOSIS — R06.02 SOB (SHORTNESS OF BREATH) ON EXERTION: Primary | ICD-10-CM

## 2024-01-15 DIAGNOSIS — D69.6 THROMBOCYTOPENIA (CMD): ICD-10-CM

## 2024-01-15 DIAGNOSIS — I26.99 OTHER PULMONARY EMBOLISM WITHOUT ACUTE COR PULMONALE, UNSPECIFIED CHRONICITY (CMD): ICD-10-CM

## 2024-01-23 ENCOUNTER — HOSPITAL ENCOUNTER (INPATIENT)
Age: 24
Discharge: HOME HEALTH | DRG: 862 | End: 2024-01-23
Attending: EMERGENCY MEDICINE | Admitting: STUDENT IN AN ORGANIZED HEALTH CARE EDUCATION/TRAINING PROGRAM

## 2024-01-23 ENCOUNTER — APPOINTMENT (OUTPATIENT)
Dept: CT IMAGING | Age: 24
DRG: 862 | End: 2024-01-23
Attending: EMERGENCY MEDICINE

## 2024-01-23 DIAGNOSIS — Z86.711 PERSONAL HISTORY OF PE (PULMONARY EMBOLISM): ICD-10-CM

## 2024-01-23 DIAGNOSIS — Z99.81 SUPPLEMENTAL OXYGEN DEPENDENT: ICD-10-CM

## 2024-01-23 DIAGNOSIS — R50.9 ACUTE FEBRILE ILLNESS: ICD-10-CM

## 2024-01-23 DIAGNOSIS — T81.49XA POSTOPERATIVE WOUND INFECTION: ICD-10-CM

## 2024-01-23 DIAGNOSIS — R11.2 INTRACTABLE NAUSEA AND VOMITING: ICD-10-CM

## 2024-01-23 DIAGNOSIS — E66.9 OBESITY (BMI 30-39.9): ICD-10-CM

## 2024-01-23 DIAGNOSIS — A41.9 SEPSIS, DUE TO UNSPECIFIED ORGANISM, UNSPECIFIED WHETHER ACUTE ORGAN DYSFUNCTION PRESENT (CMD): Primary | ICD-10-CM

## 2024-01-23 DIAGNOSIS — G89.18 POSTOPERATIVE PAIN: ICD-10-CM

## 2024-01-23 PROBLEM — E66.01 MORBID OBESITY WITH BODY MASS INDEX OF 40.0-44.9 IN ADULT  (CMD): Status: ACTIVE | Noted: 2018-06-14

## 2024-01-23 PROBLEM — D69.6 THROMBOCYTOPENIA (CMD): Status: ACTIVE | Noted: 2022-12-29

## 2024-01-23 PROBLEM — G43.909 MIGRAINE: Status: ACTIVE | Noted: 2019-01-10

## 2024-01-23 PROBLEM — M25.559 HIP PAIN: Status: ACTIVE | Noted: 2017-05-09

## 2024-01-23 PROBLEM — R05.9 COUGH: Status: ACTIVE | Noted: 2023-11-21

## 2024-01-23 PROBLEM — B95.62 CELLULITIS OF RIGHT FOOT DUE TO METHICILLIN-RESISTANT STAPHYLOCOCCUS AUREUS: Status: ACTIVE | Noted: 2024-01-23

## 2024-01-23 PROBLEM — M25.579 ANKLE PAIN: Status: ACTIVE | Noted: 2024-01-23

## 2024-01-23 PROBLEM — K44.9 HIATAL HERNIA: Status: ACTIVE | Noted: 2019-01-10

## 2024-01-23 PROBLEM — I82.409 DEEP VEIN THROMBOSIS  (CMD): Status: ACTIVE | Noted: 2024-01-08

## 2024-01-23 PROBLEM — J45.909 ASTHMA: Status: ACTIVE | Noted: 2019-01-10

## 2024-01-23 PROBLEM — Z86.14 HISTORY OF MRSA INFECTION: Status: ACTIVE | Noted: 2017-08-20

## 2024-01-23 PROBLEM — M54.9 BACK PAIN: Status: ACTIVE | Noted: 2024-01-23

## 2024-01-23 PROBLEM — M25.562 ACUTE PAIN OF LEFT KNEE: Status: ACTIVE | Noted: 2017-01-04

## 2024-01-23 PROBLEM — L03.115 CELLULITIS OF RIGHT FOOT DUE TO METHICILLIN-RESISTANT STAPHYLOCOCCUS AUREUS: Status: ACTIVE | Noted: 2024-01-23

## 2024-01-23 PROBLEM — M79.642 HAND PAIN, LEFT: Status: ACTIVE | Noted: 2022-06-02

## 2024-01-23 PROBLEM — G47.33 OBSTRUCTIVE SLEEP APNEA SYNDROME: Status: ACTIVE | Noted: 2019-01-10

## 2024-01-23 PROBLEM — S80.02XA CONTUSION OF LEFT KNEE: Status: ACTIVE | Noted: 2017-01-04

## 2024-01-23 PROBLEM — E11.9 DIABETES MELLITUS (CMD): Status: ACTIVE | Noted: 2019-01-10

## 2024-01-23 PROBLEM — R17 JAUNDICE: Status: ACTIVE | Noted: 2023-04-29

## 2024-01-23 PROBLEM — I26.99 PULMONARY EMBOLISM (CMD): Status: ACTIVE | Noted: 2024-01-08

## 2024-01-23 PROBLEM — R11.10 VOMITING: Status: ACTIVE | Noted: 2019-05-24

## 2024-01-23 PROBLEM — D50.9 IRON DEFICIENCY ANEMIA: Status: ACTIVE | Noted: 2024-01-08

## 2024-01-23 PROBLEM — N92.6 IRREGULAR PERIODS: Status: ACTIVE | Noted: 2019-01-10

## 2024-01-23 PROBLEM — F32.A DEPRESSIVE DISORDER: Status: ACTIVE | Noted: 2019-01-10

## 2024-01-23 LAB
ALBUMIN SERPL-MCNC: 2.8 G/DL (ref 3.6–5.1)
ALBUMIN/GLOB SERPL: 0.7 {RATIO} (ref 1–2.4)
ALP SERPL-CCNC: 80 UNITS/L (ref 45–117)
ALT SERPL-CCNC: 19 UNITS/L
ANION GAP SERPL CALC-SCNC: 8 MMOL/L (ref 7–19)
APTT PPP: 36 SEC (ref 22–32)
AST SERPL-CCNC: 16 UNITS/L
BASOPHILS # BLD: 0 K/MCL (ref 0–0.3)
BASOPHILS NFR BLD: 0 %
BILIRUB SERPL-MCNC: 0.9 MG/DL (ref 0.2–1)
BUN SERPL-MCNC: 9 MG/DL (ref 6–20)
BUN/CREAT SERPL: 12 (ref 7–25)
CALCIUM SERPL-MCNC: 8.7 MG/DL (ref 8.4–10.2)
CHLORIDE SERPL-SCNC: 103 MMOL/L (ref 97–110)
CO2 SERPL-SCNC: 28 MMOL/L (ref 21–32)
CREAT SERPL-MCNC: 0.76 MG/DL (ref 0.51–0.95)
D DIMER PPP FEU-MCNC: 0.8 MG/L (FEU)
DEPRECATED RDW RBC: 48.8 FL (ref 39–50)
EGFRCR SERPLBLD CKD-EPI 2021: >90 ML/MIN/{1.73_M2}
EOSINOPHIL # BLD: 0 K/MCL (ref 0–0.5)
EOSINOPHIL NFR BLD: 0 %
ERYTHROCYTE [DISTWIDTH] IN BLOOD: 14.7 % (ref 11–15)
FASTING DURATION TIME PATIENT: ABNORMAL H
GLOBULIN SER-MCNC: 3.8 G/DL (ref 2–4)
GLUCOSE SERPL-MCNC: 110 MG/DL (ref 70–99)
HCG SERPL-ACNC: <2 MUNITS/ML
HCT VFR BLD CALC: 35 % (ref 36–46.5)
HGB BLD-MCNC: 11 G/DL (ref 12–15.5)
IMM GRANULOCYTES # BLD AUTO: 0.1 K/MCL (ref 0–0.2)
IMM GRANULOCYTES # BLD: 0 %
INR PPP: 1.2
LACTATE BLDV-SCNC: 1.2 MMOL/L (ref 0–2)
LYMPHOCYTES # BLD: 3.7 K/MCL (ref 1–4.8)
LYMPHOCYTES NFR BLD: 30 %
MCH RBC QN AUTO: 28.5 PG (ref 26–34)
MCHC RBC AUTO-ENTMCNC: 31.4 G/DL (ref 32–36.5)
MCV RBC AUTO: 90.7 FL (ref 78–100)
MONOCYTES # BLD: 1.3 K/MCL (ref 0.3–0.9)
MONOCYTES NFR BLD: 11 %
NEUTROPHILS # BLD: 7.2 K/MCL (ref 1.8–7.7)
NEUTROPHILS NFR BLD: 59 %
NRBC BLD MANUAL-RTO: 0 /100 WBC
PLATELET # BLD AUTO: 166 K/MCL (ref 140–450)
POTASSIUM SERPL-SCNC: 3.8 MMOL/L (ref 3.4–5.1)
PROCALCITONIN SERPL IA-MCNC: <0.05 NG/ML
PROT SERPL-MCNC: 6.6 G/DL (ref 6.4–8.2)
PROTHROMBIN TIME: 12.3 SEC (ref 9.7–11.8)
RBC # BLD: 3.86 MIL/MCL (ref 4–5.2)
SODIUM SERPL-SCNC: 135 MMOL/L (ref 135–145)
TROPONIN I SERPL DL<=0.01 NG/ML-MCNC: 5 NG/L
WBC # BLD: 12.3 K/MCL (ref 4.2–11)

## 2024-01-23 PROCEDURE — 84145 PROCALCITONIN (PCT): CPT | Performed by: EMERGENCY MEDICINE

## 2024-01-23 PROCEDURE — 87154 CUL TYP ID BLD PTHGN 6+ TRGT: CPT | Performed by: EMERGENCY MEDICINE

## 2024-01-23 PROCEDURE — 96372 THER/PROPH/DIAG INJ SC/IM: CPT | Performed by: EMERGENCY MEDICINE

## 2024-01-23 PROCEDURE — 93005 ELECTROCARDIOGRAM TRACING: CPT | Performed by: EMERGENCY MEDICINE

## 2024-01-23 PROCEDURE — 84702 CHORIONIC GONADOTROPIN TEST: CPT | Performed by: EMERGENCY MEDICINE

## 2024-01-23 PROCEDURE — 10004651 HB RX, NO CHARGE ITEM: Performed by: EMERGENCY MEDICINE

## 2024-01-23 PROCEDURE — 96374 THER/PROPH/DIAG INJ IV PUSH: CPT

## 2024-01-23 PROCEDURE — 80053 COMPREHEN METABOLIC PANEL: CPT | Performed by: EMERGENCY MEDICINE

## 2024-01-23 PROCEDURE — 85610 PROTHROMBIN TIME: CPT | Performed by: EMERGENCY MEDICINE

## 2024-01-23 PROCEDURE — 83880 ASSAY OF NATRIURETIC PEPTIDE: CPT | Performed by: EMERGENCY MEDICINE

## 2024-01-23 PROCEDURE — 85379 FIBRIN DEGRADATION QUANT: CPT | Performed by: EMERGENCY MEDICINE

## 2024-01-23 PROCEDURE — 85025 COMPLETE CBC W/AUTO DIFF WBC: CPT | Performed by: EMERGENCY MEDICINE

## 2024-01-23 PROCEDURE — 36415 COLL VENOUS BLD VENIPUNCTURE: CPT

## 2024-01-23 PROCEDURE — 87040 BLOOD CULTURE FOR BACTERIA: CPT | Performed by: EMERGENCY MEDICINE

## 2024-01-23 PROCEDURE — 10002807 HB RX 258: Performed by: EMERGENCY MEDICINE

## 2024-01-23 PROCEDURE — 71275 CT ANGIOGRAPHY CHEST: CPT

## 2024-01-23 PROCEDURE — 85730 THROMBOPLASTIN TIME PARTIAL: CPT | Performed by: EMERGENCY MEDICINE

## 2024-01-23 PROCEDURE — 99285 EMERGENCY DEPT VISIT HI MDM: CPT

## 2024-01-23 PROCEDURE — 10002800 HB RX 250 W HCPCS: Performed by: EMERGENCY MEDICINE

## 2024-01-23 PROCEDURE — 96361 HYDRATE IV INFUSION ADD-ON: CPT

## 2024-01-23 PROCEDURE — 83605 ASSAY OF LACTIC ACID: CPT | Performed by: EMERGENCY MEDICINE

## 2024-01-23 PROCEDURE — 74177 CT ABD & PELVIS W/CONTRAST: CPT

## 2024-01-23 PROCEDURE — 96375 TX/PRO/DX INJ NEW DRUG ADDON: CPT

## 2024-01-23 PROCEDURE — 84484 ASSAY OF TROPONIN QUANT: CPT | Performed by: EMERGENCY MEDICINE

## 2024-01-23 PROCEDURE — 10002805 HB CONTRAST AGENT: Performed by: EMERGENCY MEDICINE

## 2024-01-23 RX ORDER — ACETAMINOPHEN 500 MG
1000 TABLET ORAL ONCE
Status: COMPLETED | OUTPATIENT
Start: 2024-01-24 | End: 2024-01-23

## 2024-01-23 RX ORDER — DIPHENHYDRAMINE HYDROCHLORIDE 50 MG/ML
50 INJECTION INTRAMUSCULAR; INTRAVENOUS ONCE
Status: COMPLETED | OUTPATIENT
Start: 2024-01-23 | End: 2024-01-23

## 2024-01-23 RX ORDER — METOCLOPRAMIDE HYDROCHLORIDE 5 MG/ML
10 INJECTION INTRAMUSCULAR; INTRAVENOUS ONCE
Status: COMPLETED | OUTPATIENT
Start: 2024-01-23 | End: 2024-01-23

## 2024-01-23 RX ORDER — ONDANSETRON HYDROCHLORIDE 8 MG/1
8 TABLET, FILM COATED ORAL EVERY 8 HOURS PRN
Qty: 15 TABLET | Refills: 0 | Status: ON HOLD | OUTPATIENT
Start: 2024-01-23 | End: 2024-01-27 | Stop reason: HOSPADM

## 2024-01-23 RX ORDER — ENOXAPARIN SODIUM 150 MG/ML
1 INJECTION SUBCUTANEOUS ONCE
Status: COMPLETED | OUTPATIENT
Start: 2024-01-23 | End: 2024-01-23

## 2024-01-23 RX ORDER — CEFAZOLIN SODIUM/WATER 2 G/20 ML
2000 SYRINGE (ML) INTRAVENOUS ONCE
Status: COMPLETED | OUTPATIENT
Start: 2024-01-24 | End: 2024-01-24

## 2024-01-23 RX ADMIN — ENOXAPARIN SODIUM 120 MG: 150 INJECTION SUBCUTANEOUS at 22:11

## 2024-01-23 RX ADMIN — ACETAMINOPHEN 1000 MG: 500 TABLET ORAL at 23:58

## 2024-01-23 RX ADMIN — SODIUM CHLORIDE 1000 ML: 9 INJECTION, SOLUTION INTRAVENOUS at 21:40

## 2024-01-23 RX ADMIN — IOHEXOL 75 ML: 350 INJECTION, SOLUTION INTRAVENOUS at 23:20

## 2024-01-23 RX ADMIN — METOCLOPRAMIDE HYDROCHLORIDE 10 MG: 5 INJECTION INTRAMUSCULAR; INTRAVENOUS at 21:40

## 2024-01-23 RX ADMIN — DIPHENHYDRAMINE HYDROCHLORIDE 50 MG: 50 INJECTION, SOLUTION INTRAMUSCULAR; INTRAVENOUS at 21:40

## 2024-01-23 ASSESSMENT — PAIN SCALES - GENERAL
PAINLEVEL_OUTOF10: 6
PAINLEVEL_OUTOF10: 6

## 2024-01-24 ENCOUNTER — APPOINTMENT (OUTPATIENT)
Dept: INTERVENTIONAL RADIOLOGY/VASCULAR | Age: 24
DRG: 862 | End: 2024-01-24

## 2024-01-24 PROBLEM — A41.9 SEPSIS, DUE TO UNSPECIFIED ORGANISM, UNSPECIFIED WHETHER ACUTE ORGAN DYSFUNCTION PRESENT (CMD): Status: ACTIVE | Noted: 2024-01-24

## 2024-01-24 LAB
ALBUMIN SERPL-MCNC: 2.1 G/DL (ref 3.6–5.1)
ALBUMIN/GLOB SERPL: 0.6 {RATIO} (ref 1–2.4)
ALP SERPL-CCNC: 63 UNITS/L (ref 45–117)
ALT SERPL-CCNC: 15 UNITS/L
ANION GAP SERPL CALC-SCNC: 7 MMOL/L (ref 7–19)
APPEARANCE UR: CLEAR
AST SERPL-CCNC: 12 UNITS/L
ATRIAL RATE (BPM): 100
B PARAPERT DNA SPEC QL NAA+PROBE: NOT DETECTED
B PERT.PT PRMT NPH QL NAA+NON-PROBE: NOT DETECTED
BACTERIA #/AREA URNS HPF: ABNORMAL /HPF
BASOPHILS # BLD: 0 K/MCL (ref 0–0.3)
BASOPHILS NFR BLD: 0 %
BILIRUB SERPL-MCNC: 0.6 MG/DL (ref 0.2–1)
BILIRUB UR QL STRIP: NEGATIVE
BUN SERPL-MCNC: 8 MG/DL (ref 6–20)
BUN/CREAT SERPL: 14 (ref 7–25)
C PNEUM DNA NPH QL NAA+NON-PROBE: NOT DETECTED
CALCIUM SERPL-MCNC: 7.7 MG/DL (ref 8.4–10.2)
CHLORIDE SERPL-SCNC: 107 MMOL/L (ref 97–110)
CO2 SERPL-SCNC: 26 MMOL/L (ref 21–32)
COLOR UR: YELLOW
CREAT SERPL-MCNC: 0.58 MG/DL (ref 0.51–0.95)
DEPRECATED RDW RBC: 49.1 FL (ref 39–50)
EGFRCR SERPLBLD CKD-EPI 2021: >90 ML/MIN/{1.73_M2}
EOSINOPHIL # BLD: 0 K/MCL (ref 0–0.5)
EOSINOPHIL NFR BLD: 0 %
ERYTHROCYTE [DISTWIDTH] IN BLOOD: 14.6 % (ref 11–15)
FASTING DURATION TIME PATIENT: ABNORMAL H
FLUAV RNA NPH QL NAA+NON-PROBE: NOT DETECTED
FLUBV RNA NPH QL NAA+NON-PROBE: NOT DETECTED
GLOBULIN SER-MCNC: 3.3 G/DL (ref 2–4)
GLUCOSE BLDC GLUCOMTR-MCNC: 111 MG/DL (ref 70–99)
GLUCOSE SERPL-MCNC: 105 MG/DL (ref 70–99)
GLUCOSE UR STRIP-MCNC: NEGATIVE MG/DL
HADV DNA NPH QL NAA+NON-PROBE: NOT DETECTED
HCOV 229E RNA NPH QL NAA+NON-PROBE: NOT DETECTED
HCOV HKU1 RNA NPH QL NAA+NON-PROBE: NOT DETECTED
HCOV NL63 RNA NPH QL NAA+NON-PROBE: NOT DETECTED
HCOV OC43 RNA NPH QL NAA+NON-PROBE: NOT DETECTED
HCT VFR BLD CALC: 28.9 % (ref 36–46.5)
HGB BLD-MCNC: 9 G/DL (ref 12–15.5)
HGB UR QL STRIP: NEGATIVE
HMPV RNA NPH QL NAA+NON-PROBE: NOT DETECTED
HPIV1 RNA NPH QL NAA+NON-PROBE: NOT DETECTED
HPIV2 RNA NPH QL NAA+NON-PROBE: NOT DETECTED
HPIV3 RNA NPH QL NAA+NON-PROBE: NOT DETECTED
HPIV4 RNA NPH QL NAA+NON-PROBE: NOT DETECTED
HYALINE CASTS #/AREA URNS LPF: ABNORMAL /LPF
IMM GRANULOCYTES # BLD AUTO: 0.1 K/MCL (ref 0–0.2)
IMM GRANULOCYTES # BLD: 1 %
KETONES UR STRIP-MCNC: 15 MG/DL
LEUKOCYTE ESTERASE UR QL STRIP: NEGATIVE
LYMPHOCYTES # BLD: 3.5 K/MCL (ref 1–4.8)
LYMPHOCYTES NFR BLD: 27 %
M PNEUMO DNA NPH QL NAA+NON-PROBE: NOT DETECTED
MAGNESIUM SERPL-MCNC: 1.6 MG/DL (ref 1.7–2.4)
MCH RBC QN AUTO: 28.6 PG (ref 26–34)
MCHC RBC AUTO-ENTMCNC: 31.1 G/DL (ref 32–36.5)
MCV RBC AUTO: 91.7 FL (ref 78–100)
MONOCYTES # BLD: 1.7 K/MCL (ref 0.3–0.9)
MONOCYTES NFR BLD: 13 %
MUCOUS THREADS URNS QL MICRO: PRESENT
NEUTROPHILS # BLD: 7.5 K/MCL (ref 1.8–7.7)
NEUTROPHILS NFR BLD: 59 %
NITRITE UR QL STRIP: NEGATIVE
NRBC BLD MANUAL-RTO: 0 /100 WBC
NT-PROBNP SERPL-MCNC: 45 PG/ML
P AXIS (DEGREES): 15
PH UR STRIP: 6.5 [PH] (ref 5–7)
PLATELET # BLD AUTO: 142 K/MCL (ref 140–450)
POTASSIUM SERPL-SCNC: 3.5 MMOL/L (ref 3.4–5.1)
POTASSIUM SERPL-SCNC: 4.2 MMOL/L (ref 3.4–5.1)
PR-INTERVAL (MSEC): 172
PROT SERPL-MCNC: 5.4 G/DL (ref 6.4–8.2)
PROT UR STRIP-MCNC: 30 MG/DL
QRS-INTERVAL (MSEC): 74
QT-INTERVAL (MSEC): 324
QTC: 418
R AXIS (DEGREES): 23
RAINBOW EXTRA TUBES HOLD SPECIMEN: NORMAL
RAINBOW EXTRA TUBES HOLD SPECIMEN: NORMAL
RBC # BLD: 3.15 MIL/MCL (ref 4–5.2)
RBC #/AREA URNS HPF: ABNORMAL /HPF
REPORT TEXT: NORMAL
RSV RNA NPH QL NAA+NON-PROBE: NOT DETECTED
RV+EV RNA NPH QL NAA+NON-PROBE: NOT DETECTED
SARS-COV-2 RNA RESP QL NAA+PROBE: NOT DETECTED
SODIUM SERPL-SCNC: 136 MMOL/L (ref 135–145)
SP GR UR STRIP: >1.03 (ref 1–1.03)
SQUAMOUS #/AREA URNS HPF: ABNORMAL /HPF
T AXIS (DEGREES): -24
UROBILINOGEN UR STRIP-MCNC: 8 MG/DL
VENTRICULAR RATE EKG/MIN (BPM): 100
WBC # BLD: 12.8 K/MCL (ref 4.2–11)
WBC #/AREA URNS HPF: ABNORMAL /HPF

## 2024-01-24 PROCEDURE — 10006023 HB SUPPLY 272

## 2024-01-24 PROCEDURE — 10002801 HB RX 250 W/O HCPCS: Performed by: RADIOLOGY

## 2024-01-24 PROCEDURE — 96372 THER/PROPH/DIAG INJ SC/IM: CPT | Performed by: STUDENT IN AN ORGANIZED HEALTH CARE EDUCATION/TRAINING PROGRAM

## 2024-01-24 PROCEDURE — 87070 CULTURE OTHR SPECIMN AEROBIC: CPT

## 2024-01-24 PROCEDURE — 0J9830Z DRAINAGE OF ABDOMEN SUBCUTANEOUS TISSUE AND FASCIA WITH DRAINAGE DEVICE, PERCUTANEOUS APPROACH: ICD-10-PCS | Performed by: RADIOLOGY

## 2024-01-24 PROCEDURE — 10002803 HB RX 637: Performed by: STUDENT IN AN ORGANIZED HEALTH CARE EDUCATION/TRAINING PROGRAM

## 2024-01-24 PROCEDURE — 83735 ASSAY OF MAGNESIUM: CPT | Performed by: STUDENT IN AN ORGANIZED HEALTH CARE EDUCATION/TRAINING PROGRAM

## 2024-01-24 PROCEDURE — 80053 COMPREHEN METABOLIC PANEL: CPT | Performed by: STUDENT IN AN ORGANIZED HEALTH CARE EDUCATION/TRAINING PROGRAM

## 2024-01-24 PROCEDURE — 10002800 HB RX 250 W HCPCS: Performed by: EMERGENCY MEDICINE

## 2024-01-24 PROCEDURE — 10002800 HB RX 250 W HCPCS: Performed by: INTERNAL MEDICINE

## 2024-01-24 PROCEDURE — C1769 GUIDE WIRE: HCPCS

## 2024-01-24 PROCEDURE — C1729 CATH, DRAINAGE: HCPCS

## 2024-01-24 PROCEDURE — 84132 ASSAY OF SERUM POTASSIUM: CPT | Performed by: INTERNAL MEDICINE

## 2024-01-24 PROCEDURE — 97605 NEG PRS WND THER DME<=50SQCM: CPT

## 2024-01-24 PROCEDURE — 0J983ZX DRAINAGE OF ABDOMEN SUBCUTANEOUS TISSUE AND FASCIA, PERCUTANEOUS APPROACH, DIAGNOSTIC: ICD-10-PCS | Performed by: RADIOLOGY

## 2024-01-24 PROCEDURE — 10002801 HB RX 250 W/O HCPCS: Performed by: EMERGENCY MEDICINE

## 2024-01-24 PROCEDURE — 0202U NFCT DS 22 TRGT SARS-COV-2: CPT

## 2024-01-24 PROCEDURE — 10002801 HB RX 250 W/O HCPCS: Performed by: STUDENT IN AN ORGANIZED HEALTH CARE EDUCATION/TRAINING PROGRAM

## 2024-01-24 PROCEDURE — 10002807 HB RX 258: Performed by: STUDENT IN AN ORGANIZED HEALTH CARE EDUCATION/TRAINING PROGRAM

## 2024-01-24 PROCEDURE — 81001 URINALYSIS AUTO W/SCOPE: CPT | Performed by: EMERGENCY MEDICINE

## 2024-01-24 PROCEDURE — 10030 IMG GID FLU COLL DRG SFT TIS: CPT

## 2024-01-24 PROCEDURE — 85025 COMPLETE CBC W/AUTO DIFF WBC: CPT | Performed by: STUDENT IN AN ORGANIZED HEALTH CARE EDUCATION/TRAINING PROGRAM

## 2024-01-24 PROCEDURE — 10002807 HB RX 258: Performed by: EMERGENCY MEDICINE

## 2024-01-24 PROCEDURE — 10002800 HB RX 250 W HCPCS: Performed by: STUDENT IN AN ORGANIZED HEALTH CARE EDUCATION/TRAINING PROGRAM

## 2024-01-24 PROCEDURE — 10004651 HB RX, NO CHARGE ITEM: Performed by: STUDENT IN AN ORGANIZED HEALTH CARE EDUCATION/TRAINING PROGRAM

## 2024-01-24 PROCEDURE — 36415 COLL VENOUS BLD VENIPUNCTURE: CPT | Performed by: STUDENT IN AN ORGANIZED HEALTH CARE EDUCATION/TRAINING PROGRAM

## 2024-01-24 PROCEDURE — 10003585 HB ROOM CHARGE INTERMEDIATE CARE

## 2024-01-24 PROCEDURE — 99223 1ST HOSP IP/OBS HIGH 75: CPT | Performed by: INTERNAL MEDICINE

## 2024-01-24 PROCEDURE — 97161 PT EVAL LOW COMPLEX 20 MIN: CPT

## 2024-01-24 RX ORDER — LORAZEPAM 2 MG/ML
1 INJECTION INTRAMUSCULAR ONCE
Status: COMPLETED | OUTPATIENT
Start: 2024-01-24 | End: 2024-01-24

## 2024-01-24 RX ORDER — LIDOCAINE HYDROCHLORIDE 10 MG/ML
INJECTION, SOLUTION INFILTRATION; PERINEURAL PRN
Status: COMPLETED | OUTPATIENT
Start: 2024-01-24 | End: 2024-01-24

## 2024-01-24 RX ORDER — AMOXICILLIN 250 MG
2 CAPSULE ORAL DAILY PRN
Status: DISCONTINUED | OUTPATIENT
Start: 2024-01-24 | End: 2024-01-29 | Stop reason: HOSPADM

## 2024-01-24 RX ORDER — ACETAMINOPHEN 325 MG/1
650 TABLET ORAL EVERY 4 HOURS PRN
Status: DISCONTINUED | OUTPATIENT
Start: 2024-01-24 | End: 2024-01-29 | Stop reason: HOSPADM

## 2024-01-24 RX ORDER — ONDANSETRON 2 MG/ML
4 INJECTION INTRAMUSCULAR; INTRAVENOUS EVERY 6 HOURS PRN
Status: DISCONTINUED | OUTPATIENT
Start: 2024-01-24 | End: 2024-01-24

## 2024-01-24 RX ORDER — POTASSIUM CHLORIDE 20 MEQ/1
40 TABLET, EXTENDED RELEASE ORAL ONCE
Status: COMPLETED | OUTPATIENT
Start: 2024-01-24 | End: 2024-01-24

## 2024-01-24 RX ORDER — CEFAZOLIN SODIUM/WATER 2 G/20 ML
2 SYRINGE (ML) INTRAVENOUS NIGHTLY
Status: DISCONTINUED | OUTPATIENT
Start: 2024-01-24 | End: 2024-01-26

## 2024-01-24 RX ORDER — OXYCODONE HYDROCHLORIDE 5 MG/1
5 TABLET ORAL EVERY 6 HOURS PRN
Status: DISCONTINUED | OUTPATIENT
Start: 2024-01-24 | End: 2024-01-29 | Stop reason: HOSPADM

## 2024-01-24 RX ORDER — ACETAMINOPHEN 500 MG
1000 TABLET ORAL EVERY 6 HOURS PRN
Status: ON HOLD | COMMUNITY
End: 2024-01-27 | Stop reason: HOSPADM

## 2024-01-24 RX ORDER — 0.9 % SODIUM CHLORIDE 0.9 %
2 VIAL (ML) INJECTION EVERY 12 HOURS SCHEDULED
Status: DISCONTINUED | OUTPATIENT
Start: 2024-01-24 | End: 2024-01-29 | Stop reason: HOSPADM

## 2024-01-24 RX ORDER — LANOLIN ALCOHOL/MO/W.PET/CERES
400 CREAM (GRAM) TOPICAL ONCE
Status: COMPLETED | OUTPATIENT
Start: 2024-01-24 | End: 2024-01-24

## 2024-01-24 RX ORDER — ENOXAPARIN SODIUM 100 MG/ML
40 INJECTION SUBCUTANEOUS DAILY
Status: CANCELLED | OUTPATIENT
Start: 2024-01-24

## 2024-01-24 RX ORDER — ENOXAPARIN SODIUM 150 MG/ML
1 INJECTION SUBCUTANEOUS EVERY 12 HOURS
Status: DISCONTINUED | OUTPATIENT
Start: 2024-01-24 | End: 2024-01-25

## 2024-01-24 RX ORDER — PROCHLORPERAZINE EDISYLATE 5 MG/ML
5 INJECTION INTRAMUSCULAR; INTRAVENOUS ONCE
Status: COMPLETED | OUTPATIENT
Start: 2024-01-24 | End: 2024-01-24

## 2024-01-24 RX ORDER — PROCHLORPERAZINE EDISYLATE 5 MG/ML
5 INJECTION INTRAMUSCULAR; INTRAVENOUS EVERY 8 HOURS PRN
Status: DISCONTINUED | OUTPATIENT
Start: 2024-01-24 | End: 2024-01-29 | Stop reason: HOSPADM

## 2024-01-24 RX ADMIN — PROCHLORPERAZINE EDISYLATE 5 MG: 5 INJECTION INTRAMUSCULAR; INTRAVENOUS at 08:09

## 2024-01-24 RX ADMIN — LORAZEPAM 1 MG: 2 INJECTION INTRAMUSCULAR; INTRAVENOUS at 17:19

## 2024-01-24 RX ADMIN — SODIUM CHLORIDE, PRESERVATIVE FREE 2 ML: 5 INJECTION INTRAVENOUS at 09:27

## 2024-01-24 RX ADMIN — SODIUM CHLORIDE, PRESERVATIVE FREE 2 ML: 5 INJECTION INTRAVENOUS at 20:42

## 2024-01-24 RX ADMIN — Medication 400 MG: at 15:58

## 2024-01-24 RX ADMIN — ENOXAPARIN SODIUM 120 MG: 150 INJECTION SUBCUTANEOUS at 20:42

## 2024-01-24 RX ADMIN — POTASSIUM CHLORIDE 40 MEQ: 1500 TABLET, EXTENDED RELEASE ORAL at 09:26

## 2024-01-24 RX ADMIN — OXYCODONE HYDROCHLORIDE 5 MG: 5 TABLET ORAL at 15:16

## 2024-01-24 RX ADMIN — CEFTRIAXONE SODIUM 2000 MG: 10 INJECTION, POWDER, FOR SOLUTION INTRAVENOUS at 20:43

## 2024-01-24 RX ADMIN — SODIUM CHLORIDE 1000 ML: 9 INJECTION, SOLUTION INTRAVENOUS at 00:06

## 2024-01-24 RX ADMIN — LIDOCAINE HYDROCHLORIDE 10 ML: 10 INJECTION, SOLUTION INFILTRATION; PERINEURAL at 17:27

## 2024-01-24 RX ADMIN — WATER 1750 MG: 1 INJECTION INTRAMUSCULAR; INTRAVENOUS; SUBCUTANEOUS at 00:44

## 2024-01-24 RX ADMIN — Medication 400 MG: at 09:26

## 2024-01-24 RX ADMIN — OXYCODONE HYDROCHLORIDE 5 MG: 5 TABLET ORAL at 09:35

## 2024-01-24 RX ADMIN — VANCOMYCIN HYDROCHLORIDE 1500 MG: 10 INJECTION, POWDER, LYOPHILIZED, FOR SOLUTION INTRAVENOUS at 11:40

## 2024-01-24 RX ADMIN — ONDANSETRON 4 MG: 2 INJECTION INTRAMUSCULAR; INTRAVENOUS at 07:06

## 2024-01-24 RX ADMIN — CEFTRIAXONE SODIUM 2000 MG: 10 INJECTION, POWDER, FOR SOLUTION INTRAVENOUS at 00:06

## 2024-01-24 RX ADMIN — ENOXAPARIN SODIUM 120 MG: 150 INJECTION SUBCUTANEOUS at 09:26

## 2024-01-24 RX ADMIN — LIDOCAINE HYDROCHLORIDE 10 ML: 10 INJECTION, SOLUTION INFILTRATION; PERINEURAL at 17:38

## 2024-01-24 SDOH — HEALTH STABILITY: PHYSICAL HEALTH: DO YOU HAVE DIFFICULTY DRESSING OR BATHING?: YES

## 2024-01-24 SDOH — ECONOMIC STABILITY: FOOD INSECURITY: WITHIN THE PAST 12 MONTHS, THE FOOD YOU BOUGHT JUST DIDN'T LAST AND YOU DIDN'T HAVE MONEY TO GET MORE.: NEVER TRUE

## 2024-01-24 SDOH — ECONOMIC STABILITY: HOUSING INSECURITY: WHAT IS YOUR LIVING SITUATION TODAY?: HOUSE

## 2024-01-24 SDOH — SOCIAL STABILITY: SOCIAL NETWORK: SUPPORT SYSTEMS: PARENT;FRIENDS;FAMILY MEMBERS;SIBLINGS

## 2024-01-24 SDOH — SOCIAL STABILITY: SOCIAL INSECURITY: HOW OFTEN DOES ANYONE, INCLUDING FAMILY AND FRIENDS, SCREAM OR CURSE AT YOU?: NEVER

## 2024-01-24 SDOH — ECONOMIC STABILITY: HOUSING INSECURITY: WHAT IS YOUR LIVING SITUATION TODAY?: CHILDREN

## 2024-01-24 SDOH — SOCIAL STABILITY: SOCIAL INSECURITY: HOW OFTEN DOES ANYONE, INCLUDING FAMILY AND FRIENDS, THREATEN YOU WITH HARM?: NEVER

## 2024-01-24 SDOH — ECONOMIC STABILITY: INCOME INSECURITY: IN THE PAST 12 MONTHS, HAS THE ELECTRIC, GAS, OIL, OR WATER COMPANY THREATENED TO SHUT OFF SERVICE IN YOUR HOME?: NO

## 2024-01-24 SDOH — ECONOMIC STABILITY: HOUSING INSECURITY: WHAT IS YOUR LIVING SITUATION TODAY?: I HAVE A STEADY PLACE TO LIVE

## 2024-01-24 SDOH — HEALTH STABILITY: PHYSICAL HEALTH: DO YOU HAVE SERIOUS DIFFICULTY WALKING OR CLIMBING STAIRS?: YES

## 2024-01-24 SDOH — ECONOMIC STABILITY: HOUSING INSECURITY: DO YOU HAVE PROBLEMS WITH ANY OF THE FOLLOWING?: NONE OF THE ABOVE

## 2024-01-24 SDOH — SOCIAL STABILITY: SOCIAL INSECURITY: HOW OFTEN DOES ANYONE, INCLUDING FAMILY AND FRIENDS, PHYSICALLY HURT YOU?: NEVER

## 2024-01-24 SDOH — SOCIAL STABILITY: SOCIAL INSECURITY: HOW OFTEN DOES ANYONE, INCLUDING FAMILY AND FRIENDS, INSULT OR TALK DOWN TO YOU?: NEVER

## 2024-01-24 SDOH — ECONOMIC STABILITY: GENERAL: WOULD YOU LIKE HELP WITH ANY OF THE FOLLOWING NEEDS?: I DON'T WANT HELP WITH ANY OF THESE

## 2024-01-24 SDOH — HEALTH STABILITY: GENERAL: BECAUSE OF A PHYSICAL, MENTAL, OR EMOTIONAL CONDITION, DO YOU HAVE DIFFICULTY DOING ERRANDS ALONE?: YES

## 2024-01-24 SDOH — ECONOMIC STABILITY: GENERAL

## 2024-01-24 ASSESSMENT — ACTIVITIES OF DAILY LIVING (ADL)
ADL_SHORT_OF_BREATH: YES
RECENT_DECLINE_ADL: YES, DECLINE IN AMBULATION/TRANSFERRING, COLLABORATE WITH PROVIDER (T);YES, DECLINE IN BATHING/DRESSING/FEEDING, COLLABORATE WITH PROVIDER (T)
TOILETING: NEEDS ASSISTANCE
DRESSING: NEEDS ASSISTANCE
FEEDING: INDEPENDENT
ADL_BEFORE_ADMISSION: NEEDS/REQUIRES ASSISTANCE
ADL_SCORE: 8
BATHING: NEEDS ASSISTANCE

## 2024-01-24 ASSESSMENT — COLUMBIA-SUICIDE SEVERITY RATING SCALE - C-SSRS
1. WITHIN THE PAST MONTH, HAVE YOU WISHED YOU WERE DEAD OR WISHED YOU COULD GO TO SLEEP AND NOT WAKE UP?: NO
2. HAVE YOU ACTUALLY HAD ANY THOUGHTS OF KILLING YOURSELF?: NO
IS THE PATIENT ABLE TO COMPLETE C-SSRS: YES
6. HAVE YOU EVER DONE ANYTHING, STARTED TO DO ANYTHING, OR PREPARED TO DO ANYTHING TO END YOUR LIFE?: NO

## 2024-01-24 ASSESSMENT — PATIENT HEALTH QUESTIONNAIRE - PHQ9
SUM OF ALL RESPONSES TO PHQ9 QUESTIONS 1 AND 2: 0
IS PATIENT ABLE TO COMPLETE PHQ2 OR PHQ9: YES
2. FEELING DOWN, DEPRESSED OR HOPELESS: NOT AT ALL
SUM OF ALL RESPONSES TO PHQ9 QUESTIONS 1 AND 2: 0
1. LITTLE INTEREST OR PLEASURE IN DOING THINGS: NOT AT ALL
CLINICAL INTERPRETATION OF PHQ2 SCORE: NO FURTHER SCREENING NEEDED

## 2024-01-24 ASSESSMENT — PAIN SCALES - GENERAL
PAINLEVEL_OUTOF10: 6
PAINLEVEL_OUTOF10: 3
PAINLEVEL_OUTOF10: 6
PAINLEVEL_OUTOF10: 5
PAINLEVEL_OUTOF10: 2
PAINLEVEL_OUTOF10: 2
PAINLEVEL_OUTOF10: 5
PAINLEVEL_OUTOF10: 2

## 2024-01-24 ASSESSMENT — LIFESTYLE VARIABLES
HOW MANY STANDARD DRINKS CONTAINING ALCOHOL DO YOU HAVE ON A TYPICAL DAY: 0,1 OR 2
AUDIT-C TOTAL SCORE: 0
HOW OFTEN DO YOU HAVE 6 OR MORE DRINKS ON ONE OCCASION: NEVER
HOW OFTEN DO YOU HAVE A DRINK CONTAINING ALCOHOL: NEVER
ALCOHOL_USE_STATUS: NO OR LOW RISK WITH VALIDATED TOOL

## 2024-01-25 LAB
A BAUMANNII DNA BLD POS QL NAA+NON-PROBE: NOT DETECTED
ALBUMIN SERPL-MCNC: 2 G/DL (ref 3.6–5.1)
ALBUMIN/GLOB SERPL: 0.6 {RATIO} (ref 1–2.4)
ALP SERPL-CCNC: 59 UNITS/L (ref 45–117)
ALT SERPL-CCNC: 12 UNITS/L
ANION GAP SERPL CALC-SCNC: 7 MMOL/L (ref 7–19)
AST SERPL-CCNC: 9 UNITS/L
B FRAGILIS DNA BLD POS QL NAA+NON-PROBE: NOT DETECTED
BASOPHILS # BLD: 0 K/MCL (ref 0–0.3)
BASOPHILS NFR BLD: 0 %
BILIRUB SERPL-MCNC: 0.3 MG/DL (ref 0.2–1)
BUN SERPL-MCNC: 6 MG/DL (ref 6–20)
BUN/CREAT SERPL: 11 (ref 7–25)
C ALBICANS DNA BLD POS QL NAA+NON-PROBE: NOT DETECTED
C AURIS DNA BLD POS QL NAA+NON-PROBE: NOT DETECTED
C GATTII+NEOFOR DNA BLD POS QL NAA+N-PRB: NOT DETECTED
C GLABRATA DNA BLD POS QL NAA+NON-PROBE: NOT DETECTED
C KRUSEI DNA BLD POS QL NAA+NON-PROBE: NOT DETECTED
C PARAP DNA BLD POS QL NAA+NON-PROBE: NOT DETECTED
C TROPICLS DNA BLD POS QL NAA+NON-PROBE: NOT DETECTED
CALCIUM SERPL-MCNC: 7.9 MG/DL (ref 8.4–10.2)
CHLORIDE SERPL-SCNC: 108 MMOL/L (ref 97–110)
CO2 SERPL-SCNC: 27 MMOL/L (ref 21–32)
CREAT SERPL-MCNC: 0.54 MG/DL (ref 0.51–0.95)
DEPRECATED RDW RBC: 48.7 FL (ref 39–50)
E CLOAC COMP DNA BLD POS NAA+NON-PROBE: NOT DETECTED
E COLI DNA BLD POS QL NAA+NON-PROBE: NOT DETECTED
E FAECALIS DNA BLD POS QL NAA+NON-PROBE: NOT DETECTED
E FAECIUM DNA BLD POS QL NAA+NON-PROBE: NOT DETECTED
EGFRCR SERPLBLD CKD-EPI 2021: >90 ML/MIN/{1.73_M2}
ENTEROBACTERALES DNA BLD POS NAA+N-PRB: NOT DETECTED
EOSINOPHIL # BLD: 0.1 K/MCL (ref 0–0.5)
EOSINOPHIL NFR BLD: 2 %
ERYTHROCYTE [DISTWIDTH] IN BLOOD: 14.4 % (ref 11–15)
FASTING DURATION TIME PATIENT: ABNORMAL H
GLOBULIN SER-MCNC: 3.3 G/DL (ref 2–4)
GLUCOSE SERPL-MCNC: 98 MG/DL (ref 70–99)
GP B STREP DNA CSF QL NAA+NON-PROBE: NOT DETECTED
HAEM INFLU DNA BLD POS QL NAA+NON-PROBE: NOT DETECTED
HCT VFR BLD CALC: 28.7 % (ref 36–46.5)
HGB BLD-MCNC: 8.7 G/DL (ref 12–15.5)
IMM GRANULOCYTES # BLD AUTO: 0 K/MCL (ref 0–0.2)
IMM GRANULOCYTES # BLD: 0 %
K OXYTOCA DNA BLD POS QL NAA+NON-PROBE: NOT DETECTED
KLEBSIELLA SP DNA BLD POS QL NAA+NON-PRB: NOT DETECTED
KLEBSIELLA SP DNA BLD POS QL NAA+NON-PRB: NOT DETECTED
L MONOCYTOG DNA BLD POS QL NAA+NON-PROBE: NOT DETECTED
LYMPHOCYTES # BLD: 2.1 K/MCL (ref 1–4.8)
LYMPHOCYTES NFR BLD: 35 %
MAGNESIUM SERPL-MCNC: 1.8 MG/DL (ref 1.7–2.4)
MCH RBC QN AUTO: 27.8 PG (ref 26–34)
MCHC RBC AUTO-ENTMCNC: 30.3 G/DL (ref 32–36.5)
MCV RBC AUTO: 91.7 FL (ref 78–100)
MONOCYTES # BLD: 0.8 K/MCL (ref 0.3–0.9)
MONOCYTES NFR BLD: 13 %
N MEN DNA BLD POS QL NAA+NON-PROBE: NOT DETECTED
NEUTROPHILS # BLD: 3.1 K/MCL (ref 1.8–7.7)
NEUTROPHILS NFR BLD: 50 %
NRBC BLD MANUAL-RTO: 0 /100 WBC
P AERUGINOSA DNA BLD POS NAA+NON-PROBE: NOT DETECTED
PHOSPHATE SERPL-MCNC: 3 MG/DL (ref 2.4–4.7)
PLATELET # BLD AUTO: 147 K/MCL (ref 140–450)
POTASSIUM SERPL-SCNC: 3.9 MMOL/L (ref 3.4–5.1)
PROT SERPL-MCNC: 5.3 G/DL (ref 6.4–8.2)
PROTEUS SP DNA BLD POS QL NAA+NON-PROBE: NOT DETECTED
RBC # BLD: 3.13 MIL/MCL (ref 4–5.2)
S LUGDUNENSIS DNA BLD POS QL NAA+NON-PRB: NOT DETECTED
S MALTOPHILIA DNA BLD POS QL NAA+NON-PRB: NOT DETECTED
S MARCESCENS DNA BLD POS NAA+NON-PROBE: NOT DETECTED
S PNEUM DNA BLD POS QL NAA+NON-PROBE: NOT DETECTED
S PYO DNA BLD POS QL NAA+NON-PROBE: NOT DETECTED
SALMONELLA DNA BLD POS QL NAA+NON-PROBE: NOT DETECTED
SODIUM SERPL-SCNC: 138 MMOL/L (ref 135–145)
STAPHYLOCOCCUS AUREUS: NOT DETECTED
STAPHYLOCOCCUS SPECIES: DETECTED
STREPTOCOCCUS DNA BLD POS NAA+NON-PROBE: NOT DETECTED
VANCOMYCIN TROUGH SERPL-MCNC: 11.1 MCG/ML (ref 10–20)
WBC # BLD: 6.1 K/MCL (ref 4.2–11)

## 2024-01-25 PROCEDURE — 10002807 HB RX 258: Performed by: STUDENT IN AN ORGANIZED HEALTH CARE EDUCATION/TRAINING PROGRAM

## 2024-01-25 PROCEDURE — 83735 ASSAY OF MAGNESIUM: CPT | Performed by: STUDENT IN AN ORGANIZED HEALTH CARE EDUCATION/TRAINING PROGRAM

## 2024-01-25 PROCEDURE — 84100 ASSAY OF PHOSPHORUS: CPT

## 2024-01-25 PROCEDURE — 10006031 HB ROOM CHARGE TELEMETRY

## 2024-01-25 PROCEDURE — 10002803 HB RX 637: Performed by: INTERNAL MEDICINE

## 2024-01-25 PROCEDURE — 80202 ASSAY OF VANCOMYCIN: CPT | Performed by: STUDENT IN AN ORGANIZED HEALTH CARE EDUCATION/TRAINING PROGRAM

## 2024-01-25 PROCEDURE — 10004651 HB RX, NO CHARGE ITEM: Performed by: STUDENT IN AN ORGANIZED HEALTH CARE EDUCATION/TRAINING PROGRAM

## 2024-01-25 PROCEDURE — 10002800 HB RX 250 W HCPCS: Performed by: STUDENT IN AN ORGANIZED HEALTH CARE EDUCATION/TRAINING PROGRAM

## 2024-01-25 PROCEDURE — 99233 SBSQ HOSP IP/OBS HIGH 50: CPT | Performed by: INTERNAL MEDICINE

## 2024-01-25 PROCEDURE — 87040 BLOOD CULTURE FOR BACTERIA: CPT | Performed by: INTERNAL MEDICINE

## 2024-01-25 PROCEDURE — 36415 COLL VENOUS BLD VENIPUNCTURE: CPT | Performed by: STUDENT IN AN ORGANIZED HEALTH CARE EDUCATION/TRAINING PROGRAM

## 2024-01-25 PROCEDURE — 80053 COMPREHEN METABOLIC PANEL: CPT | Performed by: STUDENT IN AN ORGANIZED HEALTH CARE EDUCATION/TRAINING PROGRAM

## 2024-01-25 PROCEDURE — 10002801 HB RX 250 W/O HCPCS: Performed by: STUDENT IN AN ORGANIZED HEALTH CARE EDUCATION/TRAINING PROGRAM

## 2024-01-25 PROCEDURE — 85025 COMPLETE CBC W/AUTO DIFF WBC: CPT | Performed by: STUDENT IN AN ORGANIZED HEALTH CARE EDUCATION/TRAINING PROGRAM

## 2024-01-25 PROCEDURE — 10002803 HB RX 637: Performed by: FAMILY MEDICINE

## 2024-01-25 RX ORDER — LORAZEPAM 0.5 MG/1
0.5 TABLET ORAL EVERY 8 HOURS PRN
Status: DISCONTINUED | OUTPATIENT
Start: 2024-01-25 | End: 2024-01-29 | Stop reason: HOSPADM

## 2024-01-25 RX ORDER — LORAZEPAM 0.5 MG/1
0.5 TABLET ORAL ONCE
Status: COMPLETED | OUTPATIENT
Start: 2024-01-25 | End: 2024-01-25

## 2024-01-25 RX ADMIN — VANCOMYCIN HYDROCHLORIDE 1500 MG: 10 INJECTION, POWDER, LYOPHILIZED, FOR SOLUTION INTRAVENOUS at 13:03

## 2024-01-25 RX ADMIN — APIXABAN 5 MG: 5 TABLET, FILM COATED ORAL at 22:11

## 2024-01-25 RX ADMIN — CEFTRIAXONE SODIUM 2000 MG: 10 INJECTION, POWDER, FOR SOLUTION INTRAVENOUS at 22:11

## 2024-01-25 RX ADMIN — SODIUM CHLORIDE, PRESERVATIVE FREE 2 ML: 5 INJECTION INTRAVENOUS at 22:16

## 2024-01-25 RX ADMIN — LORAZEPAM 0.5 MG: 0.5 TABLET ORAL at 05:25

## 2024-01-25 RX ADMIN — APIXABAN 5 MG: 5 TABLET, FILM COATED ORAL at 13:03

## 2024-01-25 RX ADMIN — SODIUM CHLORIDE, PRESERVATIVE FREE 2 ML: 5 INJECTION INTRAVENOUS at 10:47

## 2024-01-25 RX ADMIN — VANCOMYCIN HYDROCHLORIDE 1500 MG: 10 INJECTION, POWDER, LYOPHILIZED, FOR SOLUTION INTRAVENOUS at 00:25

## 2024-01-25 ASSESSMENT — PAIN SCALES - GENERAL
PAINLEVEL_OUTOF10: 3
PAINLEVEL_OUTOF10: 4
PAINLEVEL_OUTOF10: 0
PAINLEVEL_OUTOF10: 0
PAINLEVEL_OUTOF10: 3

## 2024-01-26 LAB
ALBUMIN SERPL-MCNC: 1.9 G/DL (ref 3.6–5.1)
ALBUMIN/GLOB SERPL: 0.6 {RATIO} (ref 1–2.4)
ALP SERPL-CCNC: 58 UNITS/L (ref 45–117)
ALT SERPL-CCNC: 12 UNITS/L
ANION GAP SERPL CALC-SCNC: 6 MMOL/L (ref 7–19)
AST SERPL-CCNC: 9 UNITS/L
BACTERIA BLD CULT: ABNORMAL
BILIRUB SERPL-MCNC: 0.3 MG/DL (ref 0.2–1)
BUN SERPL-MCNC: 4 MG/DL (ref 6–20)
BUN/CREAT SERPL: 10 (ref 7–25)
CALCIUM SERPL-MCNC: 7.9 MG/DL (ref 8.4–10.2)
CHLORIDE SERPL-SCNC: 109 MMOL/L (ref 97–110)
CO2 SERPL-SCNC: 28 MMOL/L (ref 21–32)
CREAT SERPL-MCNC: 0.41 MG/DL (ref 0.51–0.95)
DEPRECATED RDW RBC: 47.2 FL (ref 39–50)
EGFRCR SERPLBLD CKD-EPI 2021: >90 ML/MIN/{1.73_M2}
EOSINOPHIL # BLD: 0.1 K/MCL (ref 0–0.5)
EOSINOPHIL NFR BLD: 2 %
ERYTHROCYTE [DISTWIDTH] IN BLOOD: 14.3 % (ref 11–15)
FASTING DURATION TIME PATIENT: ABNORMAL H
GLOBULIN SER-MCNC: 3.2 G/DL (ref 2–4)
GLUCOSE SERPL-MCNC: 96 MG/DL (ref 70–99)
GRAM STN SPEC: ABNORMAL
HCT VFR BLD CALC: 28 % (ref 36–46.5)
HGB BLD-MCNC: 8.7 G/DL (ref 12–15.5)
LYMPHOCYTES # BLD: 1 K/MCL (ref 1–4.8)
LYMPHOCYTES NFR BLD: 22 %
MAGNESIUM SERPL-MCNC: 1.7 MG/DL (ref 1.7–2.4)
MCH RBC QN AUTO: 28.1 PG (ref 26–34)
MCHC RBC AUTO-ENTMCNC: 31.1 G/DL (ref 32–36.5)
MCV RBC AUTO: 90.3 FL (ref 78–100)
MONOCYTES # BLD: 0.3 K/MCL (ref 0.3–0.9)
MONOCYTES NFR BLD: 6 %
NEUTROPHILS # BLD: 3.1 K/MCL (ref 1.8–7.7)
NEUTS SEG NFR BLD: 70 %
NRBC BLD MANUAL-RTO: 0 /100 WBC
PLAT MORPH BLD: NORMAL
PLATELET # BLD AUTO: 156 K/MCL (ref 140–450)
POTASSIUM SERPL-SCNC: 3.6 MMOL/L (ref 3.4–5.1)
PROT SERPL-MCNC: 5.1 G/DL (ref 6.4–8.2)
RBC # BLD: 3.1 MIL/MCL (ref 4–5.2)
RBC MORPH BLD: NORMAL
SODIUM SERPL-SCNC: 139 MMOL/L (ref 135–145)
WBC # BLD: 4.4 K/MCL (ref 4.2–11)
WBC MORPH BLD: NORMAL

## 2024-01-26 PROCEDURE — 10004651 HB RX, NO CHARGE ITEM: Performed by: STUDENT IN AN ORGANIZED HEALTH CARE EDUCATION/TRAINING PROGRAM

## 2024-01-26 PROCEDURE — 10006031 HB ROOM CHARGE TELEMETRY

## 2024-01-26 PROCEDURE — 83735 ASSAY OF MAGNESIUM: CPT | Performed by: STUDENT IN AN ORGANIZED HEALTH CARE EDUCATION/TRAINING PROGRAM

## 2024-01-26 PROCEDURE — 10002800 HB RX 250 W HCPCS: Performed by: INTERNAL MEDICINE

## 2024-01-26 PROCEDURE — 85027 COMPLETE CBC AUTOMATED: CPT | Performed by: STUDENT IN AN ORGANIZED HEALTH CARE EDUCATION/TRAINING PROGRAM

## 2024-01-26 PROCEDURE — 10002803 HB RX 637: Performed by: INTERNAL MEDICINE

## 2024-01-26 PROCEDURE — 10000002 HB ROOM CHARGE MED SURG

## 2024-01-26 PROCEDURE — 10002800 HB RX 250 W HCPCS

## 2024-01-26 PROCEDURE — 10002807 HB RX 258

## 2024-01-26 PROCEDURE — 99233 SBSQ HOSP IP/OBS HIGH 50: CPT | Performed by: INTERNAL MEDICINE

## 2024-01-26 PROCEDURE — 80053 COMPREHEN METABOLIC PANEL: CPT | Performed by: STUDENT IN AN ORGANIZED HEALTH CARE EDUCATION/TRAINING PROGRAM

## 2024-01-26 PROCEDURE — 10002801 HB RX 250 W/O HCPCS: Performed by: STUDENT IN AN ORGANIZED HEALTH CARE EDUCATION/TRAINING PROGRAM

## 2024-01-26 PROCEDURE — 97605 NEG PRS WND THER DME<=50SQCM: CPT

## 2024-01-26 PROCEDURE — 36415 COLL VENOUS BLD VENIPUNCTURE: CPT | Performed by: STUDENT IN AN ORGANIZED HEALTH CARE EDUCATION/TRAINING PROGRAM

## 2024-01-26 PROCEDURE — 10002800 HB RX 250 W HCPCS: Performed by: STUDENT IN AN ORGANIZED HEALTH CARE EDUCATION/TRAINING PROGRAM

## 2024-01-26 PROCEDURE — 10002803 HB RX 637: Performed by: STUDENT IN AN ORGANIZED HEALTH CARE EDUCATION/TRAINING PROGRAM

## 2024-01-26 PROCEDURE — 10002807 HB RX 258: Performed by: STUDENT IN AN ORGANIZED HEALTH CARE EDUCATION/TRAINING PROGRAM

## 2024-01-26 RX ORDER — LANOLIN ALCOHOL/MO/W.PET/CERES
400 CREAM (GRAM) TOPICAL ONCE
Status: COMPLETED | OUTPATIENT
Start: 2024-01-26 | End: 2024-01-26

## 2024-01-26 RX ORDER — CEFTAZIDIME 2 G/1
2 INJECTION, POWDER, FOR SOLUTION INTRAVENOUS EVERY 8 HOURS
Qty: 66 G | Refills: 0 | Status: SHIPPED
Start: 2024-01-26 | End: 2024-01-29 | Stop reason: HOSPADM

## 2024-01-26 RX ORDER — VANCOMYCIN HYDROCHLORIDE 1 G/20ML
1500 INJECTION, POWDER, LYOPHILIZED, FOR SOLUTION INTRAVENOUS EVERY 12 HOURS
Qty: 33 EACH | Refills: 0 | Status: SHIPPED
Start: 2024-01-26 | End: 2024-01-29 | Stop reason: HOSPADM

## 2024-01-26 RX ADMIN — PROCHLORPERAZINE EDISYLATE 5 MG: 5 INJECTION INTRAMUSCULAR; INTRAVENOUS at 06:49

## 2024-01-26 RX ADMIN — VANCOMYCIN HYDROCHLORIDE 1500 MG: 10 INJECTION, POWDER, LYOPHILIZED, FOR SOLUTION INTRAVENOUS at 00:23

## 2024-01-26 RX ADMIN — APIXABAN 5 MG: 5 TABLET, FILM COATED ORAL at 20:17

## 2024-01-26 RX ADMIN — CEFTAZIDIME 2000 MG: 2 INJECTION, POWDER, FOR SOLUTION INTRAVENOUS at 18:32

## 2024-01-26 RX ADMIN — ACETAMINOPHEN 650 MG: 325 TABLET ORAL at 23:45

## 2024-01-26 RX ADMIN — VANCOMYCIN HYDROCHLORIDE 1500 MG: 10 INJECTION, POWDER, LYOPHILIZED, FOR SOLUTION INTRAVENOUS at 13:44

## 2024-01-26 RX ADMIN — SODIUM CHLORIDE, PRESERVATIVE FREE 2 ML: 5 INJECTION INTRAVENOUS at 20:20

## 2024-01-26 RX ADMIN — LORAZEPAM 0.5 MG: 0.5 TABLET ORAL at 20:18

## 2024-01-26 RX ADMIN — Medication 400 MG: at 08:39

## 2024-01-26 RX ADMIN — SODIUM CHLORIDE, PRESERVATIVE FREE 2 ML: 5 INJECTION INTRAVENOUS at 08:39

## 2024-01-26 RX ADMIN — LORAZEPAM 0.5 MG: 0.5 TABLET ORAL at 00:11

## 2024-01-26 RX ADMIN — APIXABAN 5 MG: 5 TABLET, FILM COATED ORAL at 08:39

## 2024-01-26 RX ADMIN — OXYCODONE HYDROCHLORIDE 5 MG: 5 TABLET ORAL at 20:18

## 2024-01-26 RX ADMIN — ACETAMINOPHEN 650 MG: 325 TABLET ORAL at 16:36

## 2024-01-26 RX ADMIN — VANCOMYCIN HYDROCHLORIDE 1500 MG: 10 INJECTION, POWDER, LYOPHILIZED, FOR SOLUTION INTRAVENOUS at 23:59

## 2024-01-26 RX ADMIN — OXYCODONE HYDROCHLORIDE 5 MG: 5 TABLET ORAL at 09:59

## 2024-01-26 RX ADMIN — OXYCODONE HYDROCHLORIDE 5 MG: 5 TABLET ORAL at 00:11

## 2024-01-26 RX ADMIN — Medication 400 MG: at 17:53

## 2024-01-26 ASSESSMENT — PAIN SCALES - GENERAL
PAINLEVEL_OUTOF10: 7
PAINLEVEL_OUTOF10: 0
PAINLEVEL_OUTOF10: 4
PAINLEVEL_OUTOF10: 2
PAINLEVEL_OUTOF10: 7
PAINLEVEL_OUTOF10: 3
PAINLEVEL_OUTOF10: 3
PAINLEVEL_OUTOF10: 9

## 2024-01-27 VITALS
RESPIRATION RATE: 16 BRPM | TEMPERATURE: 98.4 F | OXYGEN SATURATION: 95 % | DIASTOLIC BLOOD PRESSURE: 65 MMHG | WEIGHT: 257.5 LBS | HEART RATE: 89 BPM | BODY MASS INDEX: 38.14 KG/M2 | SYSTOLIC BLOOD PRESSURE: 97 MMHG | HEIGHT: 69 IN

## 2024-01-27 LAB
ALBUMIN SERPL-MCNC: 2.1 G/DL (ref 3.6–5.1)
ALBUMIN/GLOB SERPL: 0.7 {RATIO} (ref 1–2.4)
ALP SERPL-CCNC: 56 UNITS/L (ref 45–117)
ALT SERPL-CCNC: 11 UNITS/L
ANION GAP SERPL CALC-SCNC: 5 MMOL/L (ref 7–19)
AST SERPL-CCNC: 9 UNITS/L
BACTERIA BLD CULT: NORMAL
BACTERIA SPEC ANAEROBE+AEROBE CULT: NORMAL
BACTERIA SPEC ANAEROBE+AEROBE CULT: NORMAL
BASOPHILS # BLD: 0 K/MCL (ref 0–0.3)
BASOPHILS NFR BLD: 0 %
BILIRUB SERPL-MCNC: 0.3 MG/DL (ref 0.2–1)
BUN SERPL-MCNC: 4 MG/DL (ref 6–20)
BUN/CREAT SERPL: 8 (ref 7–25)
CALCIUM SERPL-MCNC: 8.4 MG/DL (ref 8.4–10.2)
CHLORIDE SERPL-SCNC: 111 MMOL/L (ref 97–110)
CO2 SERPL-SCNC: 31 MMOL/L (ref 21–32)
CREAT SERPL-MCNC: 0.48 MG/DL (ref 0.51–0.95)
DEPRECATED RDW RBC: 47.2 FL (ref 39–50)
EGFRCR SERPLBLD CKD-EPI 2021: >90 ML/MIN/{1.73_M2}
EOSINOPHIL # BLD: 0.1 K/MCL (ref 0–0.5)
EOSINOPHIL NFR BLD: 2 %
ERYTHROCYTE [DISTWIDTH] IN BLOOD: 14.2 % (ref 11–15)
FASTING DURATION TIME PATIENT: ABNORMAL H
GLOBULIN SER-MCNC: 3.1 G/DL (ref 2–4)
GLUCOSE SERPL-MCNC: 98 MG/DL (ref 70–99)
GRAM STN SPEC: NORMAL
HCT VFR BLD CALC: 27.6 % (ref 36–46.5)
HGB BLD-MCNC: 8.6 G/DL (ref 12–15.5)
IMM GRANULOCYTES # BLD AUTO: 0 K/MCL (ref 0–0.2)
IMM GRANULOCYTES # BLD: 1 %
LYMPHOCYTES # BLD: 1.6 K/MCL (ref 1–4.8)
LYMPHOCYTES NFR BLD: 49 %
MAGNESIUM SERPL-MCNC: 1.9 MG/DL (ref 1.7–2.4)
MCH RBC QN AUTO: 28.2 PG (ref 26–34)
MCHC RBC AUTO-ENTMCNC: 31.2 G/DL (ref 32–36.5)
MCV RBC AUTO: 90.5 FL (ref 78–100)
MONOCYTES # BLD: 0.4 K/MCL (ref 0.3–0.9)
MONOCYTES NFR BLD: 11 %
NEUTROPHILS # BLD: 1.2 K/MCL (ref 1.8–7.7)
NEUTROPHILS NFR BLD: 37 %
NRBC BLD MANUAL-RTO: 0 /100 WBC
PLATELET # BLD AUTO: 174 K/MCL (ref 140–450)
POTASSIUM SERPL-SCNC: 3.5 MMOL/L (ref 3.4–5.1)
POTASSIUM SERPL-SCNC: 3.8 MMOL/L (ref 3.4–5.1)
PROT SERPL-MCNC: 5.2 G/DL (ref 6.4–8.2)
RBC # BLD: 3.05 MIL/MCL (ref 4–5.2)
SODIUM SERPL-SCNC: 143 MMOL/L (ref 135–145)
WBC # BLD: 3.3 K/MCL (ref 4.2–11)

## 2024-01-27 PROCEDURE — 10002800 HB RX 250 W HCPCS: Performed by: INTERNAL MEDICINE

## 2024-01-27 PROCEDURE — 10002807 HB RX 258

## 2024-01-27 PROCEDURE — 80053 COMPREHEN METABOLIC PANEL: CPT | Performed by: STUDENT IN AN ORGANIZED HEALTH CARE EDUCATION/TRAINING PROGRAM

## 2024-01-27 PROCEDURE — 10002803 HB RX 637: Performed by: STUDENT IN AN ORGANIZED HEALTH CARE EDUCATION/TRAINING PROGRAM

## 2024-01-27 PROCEDURE — 10002800 HB RX 250 W HCPCS

## 2024-01-27 PROCEDURE — 10002807 HB RX 258: Performed by: STUDENT IN AN ORGANIZED HEALTH CARE EDUCATION/TRAINING PROGRAM

## 2024-01-27 PROCEDURE — 36415 COLL VENOUS BLD VENIPUNCTURE: CPT | Performed by: STUDENT IN AN ORGANIZED HEALTH CARE EDUCATION/TRAINING PROGRAM

## 2024-01-27 PROCEDURE — 99232 SBSQ HOSP IP/OBS MODERATE 35: CPT | Performed by: INTERNAL MEDICINE

## 2024-01-27 PROCEDURE — 10002801 HB RX 250 W/O HCPCS: Performed by: STUDENT IN AN ORGANIZED HEALTH CARE EDUCATION/TRAINING PROGRAM

## 2024-01-27 PROCEDURE — 84132 ASSAY OF SERUM POTASSIUM: CPT | Performed by: INTERNAL MEDICINE

## 2024-01-27 PROCEDURE — 85025 COMPLETE CBC W/AUTO DIFF WBC: CPT | Performed by: STUDENT IN AN ORGANIZED HEALTH CARE EDUCATION/TRAINING PROGRAM

## 2024-01-27 PROCEDURE — 10002800 HB RX 250 W HCPCS: Performed by: STUDENT IN AN ORGANIZED HEALTH CARE EDUCATION/TRAINING PROGRAM

## 2024-01-27 PROCEDURE — 10004651 HB RX, NO CHARGE ITEM: Performed by: STUDENT IN AN ORGANIZED HEALTH CARE EDUCATION/TRAINING PROGRAM

## 2024-01-27 PROCEDURE — 10004281 HB COUNTER-STAFF TIME PER 15 MIN

## 2024-01-27 PROCEDURE — 83735 ASSAY OF MAGNESIUM: CPT | Performed by: STUDENT IN AN ORGANIZED HEALTH CARE EDUCATION/TRAINING PROGRAM

## 2024-01-27 PROCEDURE — 10002803 HB RX 637: Performed by: INTERNAL MEDICINE

## 2024-01-27 PROCEDURE — 10006031 HB ROOM CHARGE TELEMETRY

## 2024-01-27 RX ORDER — OXYCODONE HYDROCHLORIDE 5 MG/1
5 TABLET ORAL EVERY 6 HOURS PRN
Qty: 24 TABLET | Refills: 0 | Status: SHIPPED | OUTPATIENT
Start: 2024-01-27

## 2024-01-27 RX ORDER — DIAZEPAM 5 MG/1
5 TABLET ORAL EVERY 6 HOURS PRN
Qty: 20 TABLET | Refills: 0 | Status: SHIPPED | OUTPATIENT
Start: 2024-01-27

## 2024-01-27 RX ORDER — POTASSIUM CHLORIDE 20 MEQ/1
40 TABLET, EXTENDED RELEASE ORAL ONCE
Status: COMPLETED | OUTPATIENT
Start: 2024-01-27 | End: 2024-01-27

## 2024-01-27 RX ORDER — ONDANSETRON 4 MG/1
4 TABLET, FILM COATED ORAL EVERY 8 HOURS PRN
Qty: 10 TABLET | Refills: 1 | Status: SHIPPED | OUTPATIENT
Start: 2024-01-27

## 2024-01-27 RX ADMIN — SODIUM CHLORIDE, PRESERVATIVE FREE 2 ML: 5 INJECTION INTRAVENOUS at 08:47

## 2024-01-27 RX ADMIN — LORAZEPAM 0.5 MG: 0.5 TABLET ORAL at 21:05

## 2024-01-27 RX ADMIN — PROCHLORPERAZINE EDISYLATE 5 MG: 5 INJECTION INTRAMUSCULAR; INTRAVENOUS at 08:45

## 2024-01-27 RX ADMIN — APIXABAN 5 MG: 5 TABLET, FILM COATED ORAL at 21:05

## 2024-01-27 RX ADMIN — OXYCODONE HYDROCHLORIDE 5 MG: 5 TABLET ORAL at 21:05

## 2024-01-27 RX ADMIN — APIXABAN 5 MG: 5 TABLET, FILM COATED ORAL at 08:39

## 2024-01-27 RX ADMIN — VANCOMYCIN HYDROCHLORIDE 1500 MG: 10 INJECTION, POWDER, LYOPHILIZED, FOR SOLUTION INTRAVENOUS at 11:27

## 2024-01-27 RX ADMIN — POTASSIUM CHLORIDE 40 MEQ: 1500 TABLET, EXTENDED RELEASE ORAL at 08:39

## 2024-01-27 RX ADMIN — VANCOMYCIN HYDROCHLORIDE 1500 MG: 10 INJECTION, POWDER, LYOPHILIZED, FOR SOLUTION INTRAVENOUS at 23:39

## 2024-01-27 RX ADMIN — CEFTAZIDIME 2000 MG: 2 INJECTION, POWDER, FOR SOLUTION INTRAVENOUS at 06:08

## 2024-01-27 RX ADMIN — SODIUM CHLORIDE, PRESERVATIVE FREE 2 ML: 5 INJECTION INTRAVENOUS at 21:05

## 2024-01-27 ASSESSMENT — PAIN SCALES - GENERAL
PAINLEVEL_OUTOF10: 2
PAINLEVEL_OUTOF10: 2
PAINLEVEL_OUTOF10: 7

## 2024-01-28 LAB
ALBUMIN SERPL-MCNC: 2 G/DL (ref 3.6–5.1)
ALBUMIN/GLOB SERPL: 0.6 {RATIO} (ref 1–2.4)
ALP SERPL-CCNC: 63 UNITS/L (ref 45–117)
ALT SERPL-CCNC: 16 UNITS/L
ANION GAP SERPL CALC-SCNC: 8 MMOL/L (ref 7–19)
AST SERPL-CCNC: 15 UNITS/L
BACTERIA BLD CULT: NORMAL
BASOPHILS # BLD: 0 K/MCL (ref 0–0.3)
BASOPHILS NFR BLD: 1 %
BILIRUB SERPL-MCNC: 0.3 MG/DL (ref 0.2–1)
BUN SERPL-MCNC: 3 MG/DL (ref 6–20)
BUN/CREAT SERPL: 6 (ref 7–25)
CALCIUM SERPL-MCNC: 8.4 MG/DL (ref 8.4–10.2)
CHLORIDE SERPL-SCNC: 108 MMOL/L (ref 97–110)
CO2 SERPL-SCNC: 28 MMOL/L (ref 21–32)
CREAT SERPL-MCNC: 0.49 MG/DL (ref 0.51–0.95)
DEPRECATED RDW RBC: 46.8 FL (ref 39–50)
EGFRCR SERPLBLD CKD-EPI 2021: >90 ML/MIN/{1.73_M2}
EOSINOPHIL # BLD: 0.1 K/MCL (ref 0–0.5)
EOSINOPHIL NFR BLD: 2 %
ERYTHROCYTE [DISTWIDTH] IN BLOOD: 14.3 % (ref 11–15)
FASTING DURATION TIME PATIENT: ABNORMAL H
GLOBULIN SER-MCNC: 3.3 G/DL (ref 2–4)
GLUCOSE SERPL-MCNC: 99 MG/DL (ref 70–99)
HCT VFR BLD CALC: 27.1 % (ref 36–46.5)
HGB BLD-MCNC: 8.6 G/DL (ref 12–15.5)
IMM GRANULOCYTES # BLD AUTO: 0 K/MCL (ref 0–0.2)
IMM GRANULOCYTES # BLD: 0 %
LYMPHOCYTES # BLD: 2 K/MCL (ref 1–4.8)
LYMPHOCYTES NFR BLD: 50 %
MAGNESIUM SERPL-MCNC: 1.9 MG/DL (ref 1.7–2.4)
MCH RBC QN AUTO: 28.7 PG (ref 26–34)
MCHC RBC AUTO-ENTMCNC: 31.7 G/DL (ref 32–36.5)
MCV RBC AUTO: 90.3 FL (ref 78–100)
MONOCYTES # BLD: 0.5 K/MCL (ref 0.3–0.9)
MONOCYTES NFR BLD: 13 %
NEUTROPHILS # BLD: 1.4 K/MCL (ref 1.8–7.7)
NEUTROPHILS NFR BLD: 34 %
NRBC BLD MANUAL-RTO: 0 /100 WBC
PLATELET # BLD AUTO: 199 K/MCL (ref 140–450)
POTASSIUM SERPL-SCNC: 3.6 MMOL/L (ref 3.4–5.1)
PROT SERPL-MCNC: 5.3 G/DL (ref 6.4–8.2)
RBC # BLD: 3 MIL/MCL (ref 4–5.2)
SODIUM SERPL-SCNC: 140 MMOL/L (ref 135–145)
WBC # BLD: 4 K/MCL (ref 4.2–11)

## 2024-01-28 PROCEDURE — 10002807 HB RX 258: Performed by: STUDENT IN AN ORGANIZED HEALTH CARE EDUCATION/TRAINING PROGRAM

## 2024-01-28 PROCEDURE — 80053 COMPREHEN METABOLIC PANEL: CPT | Performed by: STUDENT IN AN ORGANIZED HEALTH CARE EDUCATION/TRAINING PROGRAM

## 2024-01-28 PROCEDURE — 10002803 HB RX 637: Performed by: INTERNAL MEDICINE

## 2024-01-28 PROCEDURE — 85025 COMPLETE CBC W/AUTO DIFF WBC: CPT | Performed by: STUDENT IN AN ORGANIZED HEALTH CARE EDUCATION/TRAINING PROGRAM

## 2024-01-28 PROCEDURE — 10004651 HB RX, NO CHARGE ITEM: Performed by: STUDENT IN AN ORGANIZED HEALTH CARE EDUCATION/TRAINING PROGRAM

## 2024-01-28 PROCEDURE — 10002800 HB RX 250 W HCPCS: Performed by: STUDENT IN AN ORGANIZED HEALTH CARE EDUCATION/TRAINING PROGRAM

## 2024-01-28 PROCEDURE — 10002800 HB RX 250 W HCPCS: Performed by: INTERNAL MEDICINE

## 2024-01-28 PROCEDURE — 10006031 HB ROOM CHARGE TELEMETRY

## 2024-01-28 PROCEDURE — 10002803 HB RX 637: Performed by: STUDENT IN AN ORGANIZED HEALTH CARE EDUCATION/TRAINING PROGRAM

## 2024-01-28 PROCEDURE — 99232 SBSQ HOSP IP/OBS MODERATE 35: CPT | Performed by: INTERNAL MEDICINE

## 2024-01-28 PROCEDURE — 36415 COLL VENOUS BLD VENIPUNCTURE: CPT | Performed by: STUDENT IN AN ORGANIZED HEALTH CARE EDUCATION/TRAINING PROGRAM

## 2024-01-28 PROCEDURE — 83735 ASSAY OF MAGNESIUM: CPT | Performed by: STUDENT IN AN ORGANIZED HEALTH CARE EDUCATION/TRAINING PROGRAM

## 2024-01-28 PROCEDURE — 10002801 HB RX 250 W/O HCPCS: Performed by: STUDENT IN AN ORGANIZED HEALTH CARE EDUCATION/TRAINING PROGRAM

## 2024-01-28 RX ADMIN — OXYCODONE HYDROCHLORIDE 5 MG: 5 TABLET ORAL at 16:49

## 2024-01-28 RX ADMIN — SODIUM CHLORIDE, PRESERVATIVE FREE 2 ML: 5 INJECTION INTRAVENOUS at 20:33

## 2024-01-28 RX ADMIN — PROCHLORPERAZINE EDISYLATE 5 MG: 5 INJECTION INTRAMUSCULAR; INTRAVENOUS at 09:34

## 2024-01-28 RX ADMIN — LORAZEPAM 0.5 MG: 0.5 TABLET ORAL at 22:37

## 2024-01-28 RX ADMIN — APIXABAN 5 MG: 5 TABLET, FILM COATED ORAL at 08:05

## 2024-01-28 RX ADMIN — OXYCODONE HYDROCHLORIDE 5 MG: 5 TABLET ORAL at 22:37

## 2024-01-28 RX ADMIN — SODIUM CHLORIDE, PRESERVATIVE FREE 2 ML: 5 INJECTION INTRAVENOUS at 08:06

## 2024-01-28 RX ADMIN — VANCOMYCIN HYDROCHLORIDE 1500 MG: 10 INJECTION, POWDER, LYOPHILIZED, FOR SOLUTION INTRAVENOUS at 12:45

## 2024-01-28 RX ADMIN — APIXABAN 5 MG: 5 TABLET, FILM COATED ORAL at 20:33

## 2024-01-28 ASSESSMENT — PAIN SCALES - WONG BAKER: WONGBAKER_NUMERICALRESPONSE: 0

## 2024-01-28 ASSESSMENT — PAIN SCALES - GENERAL
PAINLEVEL_OUTOF10: 0
PAINLEVEL_OUTOF10: 6
PAINLEVEL_OUTOF10: 2

## 2024-01-29 VITALS
HEART RATE: 82 BPM | WEIGHT: 257.5 LBS | BODY MASS INDEX: 38.14 KG/M2 | HEIGHT: 69 IN | SYSTOLIC BLOOD PRESSURE: 96 MMHG | DIASTOLIC BLOOD PRESSURE: 70 MMHG | RESPIRATION RATE: 17 BRPM | TEMPERATURE: 97.9 F | OXYGEN SATURATION: 95 %

## 2024-01-29 LAB
ALBUMIN SERPL-MCNC: 2.3 G/DL (ref 3.6–5.1)
ALBUMIN/GLOB SERPL: 0.7 {RATIO} (ref 1–2.4)
ALP SERPL-CCNC: 64 UNITS/L (ref 45–117)
ALT SERPL-CCNC: 16 UNITS/L
ANION GAP SERPL CALC-SCNC: 6 MMOL/L (ref 7–19)
AST SERPL-CCNC: 18 UNITS/L
BACTERIA SPEC ANAEROBE+AEROBE CULT: NORMAL
BACTERIA SPEC ANAEROBE+AEROBE CULT: NORMAL
BILIRUB SERPL-MCNC: 0.3 MG/DL (ref 0.2–1)
BUN SERPL-MCNC: 4 MG/DL (ref 6–20)
BUN/CREAT SERPL: 8 (ref 7–25)
CALCIUM SERPL-MCNC: 8.5 MG/DL (ref 8.4–10.2)
CHLORIDE SERPL-SCNC: 110 MMOL/L (ref 97–110)
CO2 SERPL-SCNC: 29 MMOL/L (ref 21–32)
CREAT SERPL-MCNC: 0.51 MG/DL (ref 0.51–0.95)
DEPRECATED RDW RBC: 49.1 FL (ref 39–50)
EGFRCR SERPLBLD CKD-EPI 2021: >90 ML/MIN/{1.73_M2}
ERYTHROCYTE [DISTWIDTH] IN BLOOD: 14.6 % (ref 11–15)
FASTING DURATION TIME PATIENT: ABNORMAL H
GLOBULIN SER-MCNC: 3.4 G/DL (ref 2–4)
GLUCOSE SERPL-MCNC: 98 MG/DL (ref 70–99)
GRAM STN SPEC: NORMAL
HCT VFR BLD CALC: 28.6 % (ref 36–46.5)
HGB BLD-MCNC: 8.9 G/DL (ref 12–15.5)
LYMPHOCYTES # BLD: 1.8 K/MCL (ref 1–4.8)
LYMPHOCYTES NFR BLD: 39 %
MAGNESIUM SERPL-MCNC: 1.8 MG/DL (ref 1.7–2.4)
MCH RBC QN AUTO: 28.6 PG (ref 26–34)
MCHC RBC AUTO-ENTMCNC: 31.1 G/DL (ref 32–36.5)
MCV RBC AUTO: 92 FL (ref 78–100)
MONOCYTES # BLD: 0.4 K/MCL (ref 0.3–0.9)
MONOCYTES NFR BLD: 8 %
NEUTROPHILS # BLD: 2.3 K/MCL (ref 1.8–7.7)
NEUTS SEG NFR BLD: 51 %
NRBC BLD MANUAL-RTO: 0 /100 WBC
PLAT MORPH BLD: NORMAL
PLATELET # BLD AUTO: 208 K/MCL (ref 140–450)
POTASSIUM SERPL-SCNC: 3.6 MMOL/L (ref 3.4–5.1)
PROT SERPL-MCNC: 5.7 G/DL (ref 6.4–8.2)
RBC # BLD: 3.11 MIL/MCL (ref 4–5.2)
RBC MORPH BLD: NORMAL
SODIUM SERPL-SCNC: 141 MMOL/L (ref 135–145)
VARIANT LYMPHS NFR BLD: 2 % (ref 0–5)
WBC # BLD: 4.5 K/MCL (ref 4.2–11)
WBC MORPH BLD: NORMAL

## 2024-01-29 PROCEDURE — 10004180 HB COUNTER-TRANSPORT

## 2024-01-29 PROCEDURE — 97605 NEG PRS WND THER DME<=50SQCM: CPT

## 2024-01-29 PROCEDURE — 36415 COLL VENOUS BLD VENIPUNCTURE: CPT | Performed by: STUDENT IN AN ORGANIZED HEALTH CARE EDUCATION/TRAINING PROGRAM

## 2024-01-29 PROCEDURE — 10002807 HB RX 258: Performed by: STUDENT IN AN ORGANIZED HEALTH CARE EDUCATION/TRAINING PROGRAM

## 2024-01-29 PROCEDURE — 94761 N-INVAS EAR/PLS OXIMETRY MLT: CPT

## 2024-01-29 PROCEDURE — 83735 ASSAY OF MAGNESIUM: CPT | Performed by: STUDENT IN AN ORGANIZED HEALTH CARE EDUCATION/TRAINING PROGRAM

## 2024-01-29 PROCEDURE — 10004651 HB RX, NO CHARGE ITEM: Performed by: STUDENT IN AN ORGANIZED HEALTH CARE EDUCATION/TRAINING PROGRAM

## 2024-01-29 PROCEDURE — 85027 COMPLETE CBC AUTOMATED: CPT | Performed by: STUDENT IN AN ORGANIZED HEALTH CARE EDUCATION/TRAINING PROGRAM

## 2024-01-29 PROCEDURE — 10002803 HB RX 637: Performed by: STUDENT IN AN ORGANIZED HEALTH CARE EDUCATION/TRAINING PROGRAM

## 2024-01-29 PROCEDURE — 80053 COMPREHEN METABOLIC PANEL: CPT | Performed by: STUDENT IN AN ORGANIZED HEALTH CARE EDUCATION/TRAINING PROGRAM

## 2024-01-29 PROCEDURE — 13003291 HB INS MIDLINE W/GUIDE INCL CATH

## 2024-01-29 PROCEDURE — 10002800 HB RX 250 W HCPCS: Performed by: INTERNAL MEDICINE

## 2024-01-29 PROCEDURE — 10002800 HB RX 250 W HCPCS: Performed by: STUDENT IN AN ORGANIZED HEALTH CARE EDUCATION/TRAINING PROGRAM

## 2024-01-29 PROCEDURE — 10002801 HB RX 250 W/O HCPCS: Performed by: STUDENT IN AN ORGANIZED HEALTH CARE EDUCATION/TRAINING PROGRAM

## 2024-01-29 PROCEDURE — 10002803 HB RX 637: Performed by: INTERNAL MEDICINE

## 2024-01-29 PROCEDURE — 10004281 HB COUNTER-STAFF TIME PER 15 MIN

## 2024-01-29 PROCEDURE — 99239 HOSP IP/OBS DSCHRG MGMT >30: CPT | Performed by: INTERNAL MEDICINE

## 2024-01-29 RX ORDER — AMOXICILLIN 250 MG
2 CAPSULE ORAL DAILY PRN
Status: SHIPPED | COMMUNITY
Start: 2024-01-29

## 2024-01-29 RX ORDER — CIPROFLOXACIN 500 MG/1
500 TABLET, FILM COATED ORAL 2 TIMES DAILY
Qty: 14 TABLET | Refills: 0 | Status: SHIPPED | OUTPATIENT
Start: 2024-01-29 | End: 2024-02-05

## 2024-01-29 RX ORDER — 0.9 % SODIUM CHLORIDE 0.9 %
10 VIAL (ML) INJECTION PRN
Status: DISCONTINUED | OUTPATIENT
Start: 2024-01-29 | End: 2024-01-29 | Stop reason: HOSPADM

## 2024-01-29 RX ORDER — 0.9 % SODIUM CHLORIDE 0.9 %
10 VIAL (ML) INJECTION EVERY 12 HOURS SCHEDULED
Status: DISCONTINUED | OUTPATIENT
Start: 2024-01-29 | End: 2024-01-29 | Stop reason: HOSPADM

## 2024-01-29 RX ADMIN — VANCOMYCIN HYDROCHLORIDE 1500 MG: 10 INJECTION, POWDER, LYOPHILIZED, FOR SOLUTION INTRAVENOUS at 13:03

## 2024-01-29 RX ADMIN — LORAZEPAM 0.5 MG: 0.5 TABLET ORAL at 13:45

## 2024-01-29 RX ADMIN — SODIUM CHLORIDE, PRESERVATIVE FREE 2 ML: 5 INJECTION INTRAVENOUS at 09:47

## 2024-01-29 RX ADMIN — VANCOMYCIN HYDROCHLORIDE 1500 MG: 10 INJECTION, POWDER, LYOPHILIZED, FOR SOLUTION INTRAVENOUS at 00:35

## 2024-01-29 RX ADMIN — PROCHLORPERAZINE EDISYLATE 5 MG: 5 INJECTION INTRAMUSCULAR; INTRAVENOUS at 05:21

## 2024-01-29 RX ADMIN — OXYCODONE HYDROCHLORIDE 5 MG: 5 TABLET ORAL at 15:10

## 2024-01-29 RX ADMIN — APIXABAN 5 MG: 5 TABLET, FILM COATED ORAL at 09:47

## 2024-01-29 RX ADMIN — ACETAMINOPHEN 650 MG: 325 TABLET ORAL at 13:47

## 2024-01-29 ASSESSMENT — PAIN SCALES - GENERAL
PAINLEVEL_OUTOF10: 3
PAINLEVEL_OUTOF10: 0
PAINLEVEL_OUTOF10: 0
PAINLEVEL_OUTOF10: 3
PAINLEVEL_OUTOF10: 0

## 2024-01-29 ASSESSMENT — PAIN SCALES - WONG BAKER
WONGBAKER_NUMERICALRESPONSE: 0

## 2024-01-30 ENCOUNTER — APPOINTMENT (OUTPATIENT)
Dept: HEMATOLOGY/ONCOLOGY | Age: 24
End: 2024-01-30
Attending: INTERNAL MEDICINE

## 2024-01-30 LAB
BACTERIA BLD CULT: NORMAL
BACTERIA BLD CULT: NORMAL

## 2024-02-08 ENCOUNTER — TELEPHONE (OUTPATIENT)
Dept: HEMATOLOGY/ONCOLOGY | Age: 24
End: 2024-02-08

## 2024-02-08 ENCOUNTER — APPOINTMENT (OUTPATIENT)
Dept: HEMATOLOGY/ONCOLOGY | Age: 24
End: 2024-02-08
Attending: INTERNAL MEDICINE

## 2024-02-09 ENCOUNTER — TELEPHONE (OUTPATIENT)
Dept: HEMATOLOGY/ONCOLOGY | Age: 24
End: 2024-02-09

## 2024-02-09 DIAGNOSIS — I26.99 OTHER PULMONARY EMBOLISM WITHOUT ACUTE COR PULMONALE, UNSPECIFIED CHRONICITY (CMD): Primary | ICD-10-CM

## 2024-02-09 DIAGNOSIS — I82.409 DEEP VEIN THROMBOSIS (DVT) OF LOWER EXTREMITY, UNSPECIFIED CHRONICITY, UNSPECIFIED LATERALITY, UNSPECIFIED VEIN (CMD): ICD-10-CM

## 2024-02-14 ENCOUNTER — OFFICE VISIT (OUTPATIENT)
Dept: HEMATOLOGY/ONCOLOGY | Age: 24
End: 2024-02-14
Attending: INTERNAL MEDICINE

## 2024-02-14 VITALS
OXYGEN SATURATION: 98 % | SYSTOLIC BLOOD PRESSURE: 124 MMHG | TEMPERATURE: 98.3 F | BODY MASS INDEX: 38.63 KG/M2 | WEIGHT: 260.8 LBS | HEIGHT: 69 IN | RESPIRATION RATE: 14 BRPM | HEART RATE: 69 BPM | DIASTOLIC BLOOD PRESSURE: 68 MMHG

## 2024-02-14 DIAGNOSIS — I82.4Y9 ACUTE DEEP VEIN THROMBOSIS (DVT) OF PROXIMAL VEIN OF LOWER EXTREMITY, UNSPECIFIED LATERALITY (CMD): ICD-10-CM

## 2024-02-14 DIAGNOSIS — I26.99 OTHER PULMONARY EMBOLISM WITHOUT ACUTE COR PULMONALE, UNSPECIFIED CHRONICITY (CMD): Primary | ICD-10-CM

## 2024-02-14 DIAGNOSIS — D50.0 IRON DEFICIENCY ANEMIA DUE TO CHRONIC BLOOD LOSS: ICD-10-CM

## 2024-02-14 PROCEDURE — 99211 OFF/OP EST MAY X REQ PHY/QHP: CPT

## 2024-02-14 PROCEDURE — 3074F SYST BP LT 130 MM HG: CPT | Performed by: INTERNAL MEDICINE

## 2024-02-14 PROCEDURE — 3078F DIAST BP <80 MM HG: CPT | Performed by: INTERNAL MEDICINE

## 2024-02-14 PROCEDURE — 99214 OFFICE O/P EST MOD 30 MIN: CPT | Performed by: INTERNAL MEDICINE

## 2024-02-14 ASSESSMENT — ENCOUNTER SYMPTOMS
BRUISES/BLEEDS EASILY: 0
CHEST TIGHTNESS: 0
VOMITING: 0
SHORTNESS OF BREATH: 0
ADENOPATHY: 0
CHILLS: 0
COUGH: 0
FEVER: 0
FATIGUE: 0
CONSTIPATION: 0
NAUSEA: 0
WHEEZING: 0
ACTIVITY CHANGE: 0
TROUBLE SWALLOWING: 0
APPETITE CHANGE: 0
DIARRHEA: 0
STRIDOR: 0
VOICE CHANGE: 0
SORE THROAT: 0
WEAKNESS: 0
NUMBNESS: 0
ABDOMINAL PAIN: 0
EYE REDNESS: 0
ABDOMINAL DISTENTION: 0
EYE PAIN: 0
HEADACHES: 0

## 2024-02-14 ASSESSMENT — PATIENT HEALTH QUESTIONNAIRE - PHQ9
SUM OF ALL RESPONSES TO PHQ9 QUESTIONS 1 AND 2: 0
2. FEELING DOWN, DEPRESSED OR HOPELESS: NOT AT ALL
SUM OF ALL RESPONSES TO PHQ9 QUESTIONS 1 AND 2: 0
CLINICAL INTERPRETATION OF PHQ2 SCORE: NO FURTHER SCREENING NEEDED
1. LITTLE INTEREST OR PLEASURE IN DOING THINGS: NOT AT ALL

## 2024-02-14 ASSESSMENT — PAIN SCALES - GENERAL: PAINLEVEL: 0

## 2024-02-21 ENCOUNTER — TELEPHONE (OUTPATIENT)
Dept: HEMATOLOGY/ONCOLOGY | Age: 24
End: 2024-02-21

## 2024-02-22 ENCOUNTER — TELEPHONE (OUTPATIENT)
Dept: HEMATOLOGY/ONCOLOGY | Age: 24
End: 2024-02-22

## 2024-02-23 ENCOUNTER — LAB SERVICES (OUTPATIENT)
Dept: LAB | Age: 24
End: 2024-02-23

## 2024-02-23 DIAGNOSIS — D50.0 IRON DEFICIENCY ANEMIA DUE TO CHRONIC BLOOD LOSS: ICD-10-CM

## 2024-02-23 DIAGNOSIS — I26.99 OTHER PULMONARY EMBOLISM WITHOUT ACUTE COR PULMONALE, UNSPECIFIED CHRONICITY (CMD): ICD-10-CM

## 2024-02-23 LAB
AT III AG ACT/NOR PPP IA: 88 % (ref 78–131)
BASOPHILS # BLD: 0 K/MCL (ref 0–0.3)
BASOPHILS NFR BLD: 0 %
DEPRECATED RDW RBC: 49.6 FL (ref 39–50)
EOSINOPHIL # BLD: 0.1 K/MCL (ref 0–0.5)
EOSINOPHIL NFR BLD: 2 %
ERYTHROCYTE [DISTWIDTH] IN BLOOD: 15.2 % (ref 11–15)
FERRITIN SERPL-MCNC: 17 NG/ML (ref 8–252)
HCT VFR BLD CALC: 34.9 % (ref 36–46.5)
HGB BLD-MCNC: 11 G/DL (ref 12–15.5)
IMM GRANULOCYTES # BLD AUTO: 0 K/MCL (ref 0–0.2)
IMM GRANULOCYTES # BLD: 0 %
IRON SATN MFR SERPL: 11 % (ref 15–45)
IRON SERPL-MCNC: 37 MCG/DL (ref 50–170)
LYMPHOCYTES # BLD: 2 K/MCL (ref 1–4.8)
LYMPHOCYTES NFR BLD: 34 %
MCH RBC QN AUTO: 28 PG (ref 26–34)
MCHC RBC AUTO-ENTMCNC: 31.5 G/DL (ref 32–36.5)
MCV RBC AUTO: 88.8 FL (ref 78–100)
MONOCYTES # BLD: 0.6 K/MCL (ref 0.3–0.9)
MONOCYTES NFR BLD: 10 %
NEUTROPHILS # BLD: 3.1 K/MCL (ref 1.8–7.7)
NEUTROPHILS NFR BLD: 54 %
NRBC BLD MANUAL-RTO: 0 /100 WBC
PLATELET # BLD AUTO: 173 K/MCL (ref 140–450)
RBC # BLD: 3.93 MIL/MCL (ref 4–5.2)
TIBC SERPL-MCNC: 335 MCG/DL (ref 250–450)
WBC # BLD: 5.9 K/MCL (ref 4.2–11)

## 2024-02-24 LAB — PROT C ACT/NOR PPP CHRO: 108 % (ref 74–116)

## 2024-02-26 DIAGNOSIS — D50.0 IRON DEFICIENCY ANEMIA DUE TO CHRONIC BLOOD LOSS: Primary | ICD-10-CM

## 2024-02-26 LAB
APCR PPP: 2.28 RATIO
B2 GLYCOPROT1 IGA SERPL IA-ACNC: <20 SAU
B2 GLYCOPROT1 IGG SERPL IA-ACNC: <20 SGU
B2 GLYCOPROT1 IGM SERPL IA-ACNC: <20 SMU
CARDIOLIPIN IGA SER IA-ACNC: <20 APL
CARDIOLIPIN IGG SER IA-ACNC: <20 GPL
CARDIOLIPIN IGM SER IA-ACNC: <20 MPL
COAGULATION SPECIALIST REVIEW: NORMAL
F2 GENE MUT ANL BLD/T: NOT DETECTED
PROT S ACT/NOR PPP: 86 % (ref 60–110)
SERVICE CMNT-IMP: NORMAL

## 2024-02-26 RX ORDER — ALBUTEROL SULFATE 2.5 MG/3ML
5 SOLUTION RESPIRATORY (INHALATION)
OUTPATIENT
Start: 2024-03-04

## 2024-02-26 RX ORDER — DIPHENHYDRAMINE HYDROCHLORIDE 50 MG/ML
50 INJECTION INTRAMUSCULAR; INTRAVENOUS
Start: 2024-02-26

## 2024-02-26 RX ORDER — METHYLPREDNISOLONE SODIUM SUCCINATE 125 MG/2ML
125 INJECTION, POWDER, LYOPHILIZED, FOR SOLUTION INTRAMUSCULAR; INTRAVENOUS
OUTPATIENT
Start: 2024-02-26

## 2024-02-26 RX ORDER — ALBUTEROL SULFATE 2.5 MG/3ML
5 SOLUTION RESPIRATORY (INHALATION)
OUTPATIENT
Start: 2024-02-26

## 2024-02-26 RX ORDER — METHYLPREDNISOLONE SODIUM SUCCINATE 125 MG/2ML
125 INJECTION, POWDER, LYOPHILIZED, FOR SOLUTION INTRAMUSCULAR; INTRAVENOUS
OUTPATIENT
Start: 2024-03-04

## 2024-02-26 RX ORDER — DIPHENHYDRAMINE HYDROCHLORIDE 50 MG/ML
50 INJECTION INTRAMUSCULAR; INTRAVENOUS
Start: 2024-03-04

## 2024-02-26 RX ORDER — FAMOTIDINE 10 MG/ML
20 INJECTION, SOLUTION INTRAVENOUS
OUTPATIENT
Start: 2024-02-26

## 2024-02-26 RX ORDER — FAMOTIDINE 10 MG/ML
20 INJECTION, SOLUTION INTRAVENOUS
OUTPATIENT
Start: 2024-03-04

## 2024-02-26 RX ORDER — ALBUTEROL SULFATE 90 UG/1
2 AEROSOL, METERED RESPIRATORY (INHALATION)
OUTPATIENT
Start: 2024-03-04

## 2024-02-26 RX ORDER — ALBUTEROL SULFATE 90 UG/1
2 AEROSOL, METERED RESPIRATORY (INHALATION)
OUTPATIENT
Start: 2024-02-26

## 2024-02-27 ENCOUNTER — TELEPHONE (OUTPATIENT)
Dept: HEMATOLOGY/ONCOLOGY | Age: 24
End: 2024-02-27

## 2024-02-27 DIAGNOSIS — D50.0 IRON DEFICIENCY ANEMIA DUE TO CHRONIC BLOOD LOSS: Primary | ICD-10-CM

## 2024-03-06 ENCOUNTER — HOSPITAL ENCOUNTER (OUTPATIENT)
Dept: CT IMAGING | Age: 24
Discharge: HOME | End: 2024-03-06
Payer: COMMERCIAL

## 2024-03-06 DIAGNOSIS — S06.0X0A CONCUSSION WITH NO LOSS OF CONSCIOUSNESS: ICD-10-CM

## 2024-03-06 PROCEDURE — 70450 CT HEAD/BRAIN W/O DYE: CPT

## 2024-03-07 ENCOUNTER — HOSPITAL ENCOUNTER (OUTPATIENT)
Dept: INFUSION THERAPY | Age: 24
Discharge: STILL ADMITTED OR ADMINISTRATIVE | End: 2024-03-07
Attending: INTERNAL MEDICINE

## 2024-03-07 VITALS
HEART RATE: 84 BPM | DIASTOLIC BLOOD PRESSURE: 65 MMHG | RESPIRATION RATE: 16 BRPM | BODY MASS INDEX: 38.32 KG/M2 | TEMPERATURE: 98.3 F | SYSTOLIC BLOOD PRESSURE: 94 MMHG | OXYGEN SATURATION: 100 % | WEIGHT: 259.48 LBS

## 2024-03-07 DIAGNOSIS — D50.0 IRON DEFICIENCY ANEMIA DUE TO CHRONIC BLOOD LOSS: Primary | ICD-10-CM

## 2024-03-07 PROCEDURE — 10002800 HB RX 250 W HCPCS: Performed by: INTERNAL MEDICINE

## 2024-03-07 PROCEDURE — 96374 THER/PROPH/DIAG INJ IV PUSH: CPT

## 2024-03-07 PROCEDURE — 10002807 HB RX 258: Performed by: INTERNAL MEDICINE

## 2024-03-07 RX ADMIN — SODIUM CHLORIDE 250 ML: 9 INJECTION, SOLUTION INTRAVENOUS at 12:00

## 2024-03-07 RX ADMIN — FERUMOXYTOL 510 MG: 510 INJECTION INTRAVENOUS at 12:01

## 2024-03-07 ASSESSMENT — PAIN SCALES - GENERAL: PAINLEVEL_OUTOF10: 0

## 2024-03-11 ENCOUNTER — TELEPHONE (OUTPATIENT)
Dept: HEMATOLOGY/ONCOLOGY | Age: 24
End: 2024-03-11

## 2024-03-11 DIAGNOSIS — I26.99 OTHER PULMONARY EMBOLISM WITHOUT ACUTE COR PULMONALE, UNSPECIFIED CHRONICITY (CMD): Primary | ICD-10-CM

## 2024-03-11 DIAGNOSIS — I82.4Y9 ACUTE DEEP VEIN THROMBOSIS (DVT) OF PROXIMAL VEIN OF LOWER EXTREMITY, UNSPECIFIED LATERALITY (CMD): ICD-10-CM

## 2024-03-11 RX ORDER — ENOXAPARIN SODIUM 150 MG/ML
120 INJECTION SUBCUTANEOUS EVERY 12 HOURS SCHEDULED
Qty: 48 ML | Refills: 1 | Status: SHIPPED | OUTPATIENT
Start: 2024-03-11

## 2024-03-14 ENCOUNTER — APPOINTMENT (OUTPATIENT)
Dept: INFUSION THERAPY | Age: 24
End: 2024-03-14
Attending: INTERNAL MEDICINE

## 2024-03-15 ENCOUNTER — APPOINTMENT (OUTPATIENT)
Dept: HEMATOLOGY/ONCOLOGY | Age: 24
End: 2024-03-15
Attending: INTERNAL MEDICINE

## 2024-03-29 ENCOUNTER — APPOINTMENT (OUTPATIENT)
Dept: HEMATOLOGY/ONCOLOGY | Age: 24
End: 2024-03-29
Attending: INTERNAL MEDICINE

## 2024-04-03 LAB
ANTIBODY SCREEN OB: NEGATIVE
HEPATITIS B SURFACE ANTIGEN OB: NEGATIVE
HIV ANTIGEN-ANTIBODY QUAL: NONREACTIVE
SYPHILIS-TOTAL QUAL: NONREACTIVE

## 2024-04-04 ENCOUNTER — OFFICE VISIT (OUTPATIENT)
Dept: HEMATOLOGY/ONCOLOGY | Age: 24
End: 2024-04-04
Attending: INTERNAL MEDICINE

## 2024-04-04 VITALS
HEIGHT: 69 IN | BODY MASS INDEX: 38.36 KG/M2 | OXYGEN SATURATION: 97 % | RESPIRATION RATE: 18 BRPM | WEIGHT: 259 LBS | DIASTOLIC BLOOD PRESSURE: 64 MMHG | TEMPERATURE: 97.2 F | SYSTOLIC BLOOD PRESSURE: 100 MMHG | HEART RATE: 68 BPM

## 2024-04-04 DIAGNOSIS — I82.4Y9 ACUTE DEEP VEIN THROMBOSIS (DVT) OF PROXIMAL VEIN OF LOWER EXTREMITY, UNSPECIFIED LATERALITY (CMD): ICD-10-CM

## 2024-04-04 DIAGNOSIS — I26.99 OTHER PULMONARY EMBOLISM WITHOUT ACUTE COR PULMONALE, UNSPECIFIED CHRONICITY (CMD): ICD-10-CM

## 2024-04-04 PROCEDURE — G2211 COMPLEX E/M VISIT ADD ON: HCPCS | Performed by: INTERNAL MEDICINE

## 2024-04-04 PROCEDURE — 99214 OFFICE O/P EST MOD 30 MIN: CPT | Performed by: INTERNAL MEDICINE

## 2024-04-04 PROCEDURE — 3074F SYST BP LT 130 MM HG: CPT | Performed by: INTERNAL MEDICINE

## 2024-04-04 PROCEDURE — 3078F DIAST BP <80 MM HG: CPT | Performed by: INTERNAL MEDICINE

## 2024-04-04 PROCEDURE — 99211 OFF/OP EST MAY X REQ PHY/QHP: CPT

## 2024-04-04 RX ORDER — ENOXAPARIN SODIUM 150 MG/ML
120 INJECTION SUBCUTANEOUS EVERY 12 HOURS SCHEDULED
Qty: 48 ML | Refills: 1 | Status: SHIPPED | OUTPATIENT
Start: 2024-04-04

## 2024-04-04 ASSESSMENT — ENCOUNTER SYMPTOMS
WHEEZING: 0
TROUBLE SWALLOWING: 0
CHILLS: 0
SHORTNESS OF BREATH: 0
VOMITING: 0
FEVER: 0
HEADACHES: 0
NAUSEA: 0
BRUISES/BLEEDS EASILY: 0
CONSTIPATION: 0
WEAKNESS: 0
ADENOPATHY: 0
FATIGUE: 0
VOICE CHANGE: 0
APPETITE CHANGE: 0
STRIDOR: 0
EYE REDNESS: 0
ACTIVITY CHANGE: 0
EYE PAIN: 0
CHEST TIGHTNESS: 0
ABDOMINAL DISTENTION: 0
SORE THROAT: 0
ABDOMINAL PAIN: 0
NUMBNESS: 0
DIARRHEA: 0
COUGH: 0

## 2024-04-04 ASSESSMENT — PATIENT HEALTH QUESTIONNAIRE - PHQ9
CLINICAL INTERPRETATION OF PHQ2 SCORE: NO FURTHER SCREENING NEEDED
SUM OF ALL RESPONSES TO PHQ9 QUESTIONS 1 AND 2: 0
2. FEELING DOWN, DEPRESSED OR HOPELESS: NOT AT ALL
1. LITTLE INTEREST OR PLEASURE IN DOING THINGS: NOT AT ALL
SUM OF ALL RESPONSES TO PHQ9 QUESTIONS 1 AND 2: 0

## 2024-04-04 ASSESSMENT — PAIN SCALES - GENERAL: PAINLEVEL: 0

## 2024-05-22 ENCOUNTER — APPOINTMENT (OUTPATIENT)
Dept: LAB | Age: 24
End: 2024-05-22
Attending: INTERNAL MEDICINE

## 2024-05-22 ENCOUNTER — APPOINTMENT (OUTPATIENT)
Dept: HEMATOLOGY/ONCOLOGY | Age: 24
End: 2024-05-22
Attending: INTERNAL MEDICINE

## 2024-06-09 ENCOUNTER — HOSPITAL ENCOUNTER (OUTPATIENT)
Facility: HOSPITAL | Age: 24
Discharge: HOME | End: 2024-06-09
Attending: OBSTETRICS & GYNECOLOGY | Admitting: OBSTETRICS & GYNECOLOGY
Payer: COMMERCIAL

## 2024-06-09 ENCOUNTER — APPOINTMENT (OUTPATIENT)
Dept: ULTRASOUND IMAGING | Facility: HOSPITAL | Age: 24
End: 2024-06-09
Attending: OBSTETRICS & GYNECOLOGY
Payer: COMMERCIAL

## 2024-06-09 VITALS
SYSTOLIC BLOOD PRESSURE: 120 MMHG | WEIGHT: 250 LBS | BODY MASS INDEX: 37.03 KG/M2 | HEART RATE: 65 BPM | RESPIRATION RATE: 16 BRPM | TEMPERATURE: 98 F | DIASTOLIC BLOOD PRESSURE: 69 MMHG | HEIGHT: 69 IN

## 2024-06-09 PROCEDURE — 99213 OFFICE O/P EST LOW 20 MIN: CPT

## 2024-06-09 PROCEDURE — 76815 OB US LIMITED FETUS(S): CPT | Performed by: OBSTETRICS & GYNECOLOGY

## 2024-06-09 RX ORDER — CHOLECALCIFEROL (VITAMIN D3) 25 MCG
1 TABLET,CHEWABLE ORAL DAILY
COMMUNITY

## 2024-06-09 RX ORDER — ENOXAPARIN SODIUM 150 MG/ML
120 INJECTION SUBCUTANEOUS EVERY 12 HOURS
COMMUNITY

## 2024-06-09 NOTE — PROGRESS NOTES
Pt is a 23 year old female admitted to TRG3/TRG3-A.  Pt is  19w2d intra-uterine pregnancy.    Chief Complaint   Patient presents with     Assessment     Pt complaining of vaginal bleeding since about 9 am. Initially bright red blood that transitioned to light pink. Filled 2 light pads since onset. Light spotting upon arrival to triage. C/o mild pelvic cramping. Denies contractions or LOF.         History obtained, oriented to room, staff, and plan of care.

## 2024-06-10 NOTE — PROGRESS NOTES
Discharge instructions given to pt and discussed. Pt states no questions at this time, Verb understanding and agreeable to POC. Pt escorted out by this RN with instructions in hand in stable condition.

## 2024-06-13 ENCOUNTER — E-ADVICE (OUTPATIENT)
Dept: HEMATOLOGY/ONCOLOGY | Age: 24
End: 2024-06-13

## 2024-06-13 ENCOUNTER — TELEPHONE (OUTPATIENT)
Dept: HEMATOLOGY/ONCOLOGY | Age: 24
End: 2024-06-13

## 2024-07-10 ENCOUNTER — APPOINTMENT (OUTPATIENT)
Dept: HEMATOLOGY/ONCOLOGY | Age: 24
End: 2024-07-10
Attending: INTERNAL MEDICINE

## 2024-07-11 ENCOUNTER — APPOINTMENT (OUTPATIENT)
Dept: HEMATOLOGY/ONCOLOGY | Age: 24
End: 2024-07-11
Attending: INTERNAL MEDICINE

## 2024-07-11 ENCOUNTER — APPOINTMENT (OUTPATIENT)
Dept: LAB | Age: 24
End: 2024-07-11
Attending: INTERNAL MEDICINE

## 2024-08-05 LAB — HIV RESULT OB: NEGATIVE

## 2024-10-07 LAB — STREP GP B CULT OB: NEGATIVE

## 2024-10-11 ENCOUNTER — TELEPHONE (OUTPATIENT)
Dept: OBGYN UNIT | Facility: HOSPITAL | Age: 24
End: 2024-10-11

## 2024-10-14 ENCOUNTER — HOSPITAL ENCOUNTER (OUTPATIENT)
Facility: HOSPITAL | Age: 24
Discharge: HOME | End: 2024-10-15
Attending: OBSTETRICS & GYNECOLOGY | Admitting: OBSTETRICS & GYNECOLOGY
Payer: COMMERCIAL

## 2024-10-14 VITALS
TEMPERATURE: 98 F | WEIGHT: 265 LBS | SYSTOLIC BLOOD PRESSURE: 128 MMHG | RESPIRATION RATE: 16 BRPM | HEART RATE: 60 BPM | BODY MASS INDEX: 39 KG/M2 | DIASTOLIC BLOOD PRESSURE: 67 MMHG

## 2024-10-14 PROCEDURE — 96361 HYDRATE IV INFUSION ADD-ON: CPT

## 2024-10-14 PROCEDURE — 96374 THER/PROPH/DIAG INJ IV PUSH: CPT

## 2024-10-14 RX ORDER — ONDANSETRON 2 MG/ML
8 INJECTION INTRAMUSCULAR; INTRAVENOUS ONCE
Status: COMPLETED | OUTPATIENT
Start: 2024-10-14 | End: 2024-10-14

## 2024-10-14 RX ORDER — ONDANSETRON 4 MG/1
4 TABLET, FILM COATED ORAL EVERY 8 HOURS PRN
COMMUNITY

## 2024-10-15 VITALS — HEART RATE: 75 BPM | SYSTOLIC BLOOD PRESSURE: 116 MMHG | DIASTOLIC BLOOD PRESSURE: 76 MMHG

## 2024-10-15 LAB
ALBUMIN SERPL-MCNC: 3.3 G/DL (ref 3.2–4.8)
ALBUMIN/GLOB SERPL: 1.5 {RATIO} (ref 1–2)
ALP LIVER SERPL-CCNC: 97 U/L
ALT SERPL-CCNC: 12 U/L
ANION GAP SERPL CALC-SCNC: 3 MMOL/L (ref 0–18)
AST SERPL-CCNC: 26 U/L (ref ?–34)
BASOPHILS # BLD AUTO: 0.02 X10(3) UL (ref 0–0.2)
BASOPHILS NFR BLD AUTO: 0.3 %
BILIRUB SERPL-MCNC: 1.2 MG/DL (ref 0.3–1.2)
BUN BLD-MCNC: <5 MG/DL (ref 9–23)
CALCIUM BLD-MCNC: 8.4 MG/DL (ref 8.7–10.4)
CHLORIDE SERPL-SCNC: 106 MMOL/L (ref 98–112)
CO2 SERPL-SCNC: 27 MMOL/L (ref 21–32)
CREAT BLD-MCNC: 0.46 MG/DL
EGFRCR SERPLBLD CKD-EPI 2021: 135 ML/MIN/1.73M2 (ref 60–?)
EOSINOPHIL # BLD AUTO: 0.03 X10(3) UL (ref 0–0.7)
EOSINOPHIL NFR BLD AUTO: 0.4 %
ERYTHROCYTE [DISTWIDTH] IN BLOOD BY AUTOMATED COUNT: 14 %
FASTING STATUS PATIENT QL REPORTED: NO
GLOBULIN PLAS-MCNC: 2.2 G/DL (ref 2–3.5)
GLUCOSE BLD-MCNC: 88 MG/DL (ref 70–99)
HCT VFR BLD AUTO: 34.2 %
HGB BLD-MCNC: 12 G/DL
IMM GRANULOCYTES # BLD AUTO: 0.03 X10(3) UL (ref 0–1)
IMM GRANULOCYTES NFR BLD: 0.4 %
LYMPHOCYTES # BLD AUTO: 1.7 X10(3) UL (ref 1–4)
LYMPHOCYTES NFR BLD AUTO: 23.1 %
MCH RBC QN AUTO: 33.2 PG (ref 26–34)
MCHC RBC AUTO-ENTMCNC: 35.1 G/DL (ref 31–37)
MCV RBC AUTO: 94.7 FL
MONOCYTES # BLD AUTO: 0.64 X10(3) UL (ref 0.1–1)
MONOCYTES NFR BLD AUTO: 8.7 %
NEUTROPHILS # BLD AUTO: 4.94 X10 (3) UL (ref 1.5–7.7)
NEUTROPHILS # BLD AUTO: 4.94 X10(3) UL (ref 1.5–7.7)
NEUTROPHILS NFR BLD AUTO: 67.1 %
PLATELET # BLD AUTO: 127 10(3)UL (ref 150–450)
POTASSIUM SERPL-SCNC: 3.4 MMOL/L (ref 3.5–5.1)
PROT SERPL-MCNC: 5.5 G/DL (ref 5.7–8.2)
RBC # BLD AUTO: 3.61 X10(6)UL
SODIUM SERPL-SCNC: 136 MMOL/L (ref 136–145)
WBC # BLD AUTO: 7.4 X10(3) UL (ref 4–11)

## 2024-10-15 PROCEDURE — 59025 FETAL NON-STRESS TEST: CPT

## 2024-10-15 PROCEDURE — 36415 COLL VENOUS BLD VENIPUNCTURE: CPT

## 2024-10-15 PROCEDURE — 99213 OFFICE O/P EST LOW 20 MIN: CPT

## 2024-10-15 PROCEDURE — 80053 COMPREHEN METABOLIC PANEL: CPT | Performed by: OBSTETRICS & GYNECOLOGY

## 2024-10-15 PROCEDURE — 85025 COMPLETE CBC W/AUTO DIFF WBC: CPT | Performed by: OBSTETRICS & GYNECOLOGY

## 2024-10-15 PROCEDURE — 96374 THER/PROPH/DIAG INJ IV PUSH: CPT

## 2024-10-15 PROCEDURE — 96361 HYDRATE IV INFUSION ADD-ON: CPT

## 2024-10-15 PROCEDURE — 96375 TX/PRO/DX INJ NEW DRUG ADDON: CPT

## 2024-10-15 PROCEDURE — 99214 OFFICE O/P EST MOD 30 MIN: CPT

## 2024-10-15 RX ORDER — ACETAMINOPHEN 500 MG
1000 TABLET ORAL EVERY 6 HOURS PRN
Status: DISCONTINUED | OUTPATIENT
Start: 2024-10-15 | End: 2024-10-15

## 2024-10-15 RX ORDER — METOCLOPRAMIDE HYDROCHLORIDE 5 MG/ML
10 INJECTION INTRAMUSCULAR; INTRAVENOUS EVERY 6 HOURS PRN
Status: DISCONTINUED | OUTPATIENT
Start: 2024-10-15 | End: 2024-10-15

## 2024-10-15 RX ORDER — ONDANSETRON 2 MG/ML
8 INJECTION INTRAMUSCULAR; INTRAVENOUS EVERY 6 HOURS PRN
Status: DISCONTINUED | OUTPATIENT
Start: 2024-10-15 | End: 2024-10-15

## 2024-10-15 NOTE — PROGRESS NOTES
Pt is a 24 year old female admitted to TRG5/TRG5-A.     Chief Complaint   Patient presents with     Complication     Unable to keep food or water down for three days, this is ongoing through entire pregnancy. Tried Zofran and Reglan at home, but states she threw those up as well.       Pt is  37w3d intra-uterine pregnancy.  History obtained, consents signed. Oriented to room, staff, and plan of care.

## 2024-10-15 NOTE — PROGRESS NOTES
Pt is a 24 year old female admitted to TRG2/TRG2-A.     Chief Complaint   Patient presents with     Complication    Pt was seen this am with reports of unablt to keep food or water down x4 days now.   Pt states she received \"2 IV bags and IV zofran and she immediately threw up.\"   Pt reports cramping and small amounts of LOF over last 1.5 days.     Pt is  37w4d intra-uterine pregnancy.  History obtained, consents signed. Oriented to room, staff, and plan of care.

## 2024-10-15 NOTE — DISCHARGE INSTRUCTIONS
Discharge Instructions    Diet: small frequent meals            General Instructions    Call your OB doctor if: Fluid leaking from your vagina;Vaginal bleeding;Uterine contractions increasing in intensity and frequency;Vaginal or rectal pressure;Uterine contractions 10 minutes or closer for 1 to 2 hours;Temperature greater than 100F;Decrease in fetal movement  Early labor comfort measures: Drink fluids and eat small light meals;Take a walk;Relax, sleep, take a warm bath or shower for 30 minutes or less

## 2024-10-15 NOTE — PROGRESS NOTES
Pt to be discharged home, return precautions reviewed and copy of AVS provided, pt and her mother verbalize understanding. Pt has PERRY 10/18 and scheduled R/CS 10/25

## 2024-10-16 ENCOUNTER — TELEPHONE (OUTPATIENT)
Dept: OBGYN UNIT | Facility: HOSPITAL | Age: 24
End: 2024-10-16

## 2024-10-18 ENCOUNTER — HOSPITAL ENCOUNTER (OUTPATIENT)
Facility: HOSPITAL | Age: 24
Discharge: HOME | End: 2024-10-18
Attending: OBSTETRICS & GYNECOLOGY | Admitting: OBSTETRICS & GYNECOLOGY
Payer: COMMERCIAL

## 2024-10-18 VITALS
SYSTOLIC BLOOD PRESSURE: 114 MMHG | TEMPERATURE: 98 F | WEIGHT: 266 LBS | DIASTOLIC BLOOD PRESSURE: 63 MMHG | HEART RATE: 54 BPM | BODY MASS INDEX: 39 KG/M2

## 2024-10-18 PROCEDURE — 59025 FETAL NON-STRESS TEST: CPT

## 2024-10-18 PROCEDURE — 99212 OFFICE O/P EST SF 10 MIN: CPT

## 2024-10-18 RX ORDER — FERROUS SULFATE 325(65) MG
325 TABLET, DELAYED RELEASE (ENTERIC COATED) ORAL
COMMUNITY
End: 2024-10-28

## 2024-10-18 NOTE — PROGRESS NOTES
Occupational Therapy Visit        SUBJECTIVE                                                                                                               Patient reports some pain on the radial side of the MCP joint of the Right thumb. She reports that she prefers the comfort cool over the thumb spica. She reports that she continues to have difficulties opening jars but has attempted to pick more objects up.     Pain / Symptoms  - Pain rating (out of 10): Current: 0 ; Best: 0; Worst: 2     OBJECTIVE                                                                                                                                        Treatment    Ultrasound  - Location: right thumb MCP, and thenar eminance  - Position: sitting  - Duty Cycle: 50%  - Frequency: 3 Mhz  - Intensity (w/cm2): 1.0  - Duration: 8 minutes      Therapeutic Exercise  PROM thumb flexion and wrist  Thumb opposition  Thumb MP/IP blocking  Wrist AROM Flexion Extension   Thumb blocking  Green sponge gripping x 20  Green sponge 3pt pinch x 20  Yellow web thumb extension x 8    Manual Therapy   STM & CFM over right thumb, thenar eminence and MCP             Skilled input: verbal instruction/cues    Writer verbally educated and received verbal consent for hand placement, positioning of patient, and techniques to be performed today from patient for therapist position for techniques as described above and how they are pertinent to the patient's plan of care.  Home Exercise Program  Access Code: [email or web address]  URL: [email or web address]  Date: 04/10/2024  Prepared by: Saskia Ortiz    Exercises  - Wrist AROM Flexion Extension  - 3 x daily - 7 x weekly - 1 sets - 10 reps - 3-5 hold  - Wrist AROM Radial Ulnar Deviation  - 3 x daily - 7 x weekly - 1 sets - 10 reps - 3-5 hold  - Thumb Radial Adduction with Thumb Flexion AROM on Table  - 3 x daily - 7 x weekly - 1 sets - 10 reps - 3-5 hold  - Thumb Opposition  - 3 x daily - 7 x weekly - 1  Pt is a 24 year old female admitted to TRG2/TRG2-A.     Chief Complaint   Patient presents with    Non-stress Test     Non reactive Nst in office.  States she last felt baby move last night.  Some pink tinged mucousy discharge, denies leaking.       Pt is  38w0d intra-uterine pregnancy.  History obtained, consents signed. Oriented to room, staff, and plan of care.   sets - 10 reps - 3-5 hold  - Thumb AROM MP Blocking  - 3 x daily - 7 x weekly - 1 sets - 10 reps - 3-5 hold  - Thumb AROM IP Blocking  - 3 x daily - 7 x weekly - 1 sets - 10 reps - 3-5 hold  - Seated Thumb Composite Flexion AROM  - 3 x daily - 7 x weekly - 1 sets - 10 reps - 3-5 hold      ASSESSMENT                                                                                                            Patient presents with decreased pain over right thumb MCP joint. Patient has been wear comfort cool consistently and reports improved comfort and use of the hand. Progressed with gentle strengthening today which was tolerated well with out pain. Some fatigue noted with thumb extension exercise but over tolerated exercise well.   Education:   - Results of above outlined education: Verbalizes understanding and Demonstrates understanding    PLAN                                                                                                                           Suggestions for next session as indicated: Continue to progress strength as tolerated       Therapy procedure time and total treatment time can be found documented on the Time Entry flowsheet   Gen: well-nourished; NAD  Skin: warm and dry, no rashes  Head: NC/AT  Eyes: PERRLA; EOM intact; conjunctiva clear  ENT: external ear normal, no nasal discharge  Mouth: MMM, no pharyngeal erythema  Neck: FROM, non-tender,  Resp: no chest wall deformity; CTAB with good aeration, normal WOB  Cardio: RRR, S1/S2 normal; no m/r/g  Abd: soft, NTND; normoactive bowel sounds; no HSM, no masses  Extremities: FROM, no tenderness, no edema  Vascular: pulses 2+ bilat UE/LE, brisk capillary refill  Neuro: alert, oriented, no gross deficits  MSK: normal tone, without deformities 0

## 2024-10-18 NOTE — NST
Nonstress Test   Patient: Carmela Bell    Gestation: 38w0d    NST:       Variability: Moderate           Accelerations: Yes           Decelerations: None            Baseline: 140 BPM           Uterine Irritability: No           Contractions: Irregular                                        Acoustic Stimulator: No           Nonstress Test Interpretation: Reactive           Nonstress Test Second Interpretation: Reactive                     Additional Comments      Physician Evaluation      NST Interpretation: Reactive    Disposition:   Discharged    Bernabe Leos DO

## 2024-10-23 ENCOUNTER — ULTRASOUND ENCOUNTER (OUTPATIENT)
Dept: PERINATAL CARE | Facility: HOSPITAL | Age: 24
End: 2024-10-23
Attending: OBSTETRICS & GYNECOLOGY
Payer: COMMERCIAL

## 2024-10-23 ENCOUNTER — HOSPITAL ENCOUNTER (OUTPATIENT)
Facility: HOSPITAL | Age: 24
Discharge: HOME | End: 2024-10-23
Attending: OBSTETRICS & GYNECOLOGY | Admitting: OBSTETRICS & GYNECOLOGY
Payer: COMMERCIAL

## 2024-10-23 VITALS
TEMPERATURE: 98 F | RESPIRATION RATE: 18 BRPM | DIASTOLIC BLOOD PRESSURE: 77 MMHG | HEART RATE: 60 BPM | SYSTOLIC BLOOD PRESSURE: 125 MMHG

## 2024-10-23 DIAGNOSIS — O36.8190: Primary | ICD-10-CM

## 2024-10-23 PROCEDURE — 76819 FETAL BIOPHYS PROFIL W/O NST: CPT | Performed by: OBSTETRICS & GYNECOLOGY

## 2024-10-23 PROCEDURE — 59025 FETAL NON-STRESS TEST: CPT

## 2024-10-23 PROCEDURE — 76815 OB US LIMITED FETUS(S): CPT

## 2024-10-23 PROCEDURE — 76818 FETAL BIOPHYS PROFILE W/NST: CPT | Performed by: OBSTETRICS & GYNECOLOGY

## 2024-10-23 PROCEDURE — 99214 OFFICE O/P EST MOD 30 MIN: CPT

## 2024-10-23 NOTE — PROGRESS NOTES
Pt is a 24 year old female admitted to TRG3/TRG3-A.     Chief Complaint   Patient presents with    Non-stress Test      Pt is  38w5d intra-uterine pregnancy.  History obtained, consents signed. Oriented to room, staff, and plan of care.    Pt sent from office, per Dr Orr, NST reactive but pt reports decreased fetal movement.  Pt reports continued nausea and vomiting, states she vomited in transit over from office.

## 2024-10-23 NOTE — DISCHARGE INSTRUCTIONS
Discharge Instructions    Diet: Regular            General Instructions    Call your OB doctor if: Fluid leaking from your vagina;Vaginal or rectal pressure;Decrease in fetal movement;Temperature greater than 100F;Uterine contractions increasing in intensity and frequency;Uterine contractions 10 minutes or closer for 1 to 2 hours;Vaginal bleeding  Early labor comfort measures: Relax, sleep, take a warm bath or shower for 30 minutes or less;Take a walk

## 2024-10-23 NOTE — NST
Nonstress Test   Patient: Carmela Bell    Gestation: 38w5d    NST:       Variability: Moderate           Accelerations: Yes           Decelerations: None            Baseline: 130 BPM           Uterine Irritability: No           Contractions: Not present                                        Acoustic Stimulator: No           Nonstress Test Interpretation: Reactive                                 Additional Comments

## 2024-10-23 NOTE — PROGRESS NOTES
[facility] ULTRASOUND REPORT   See imaging tab for complete consultation / ultrasound report      Ultrasound Findings:  Single IUP in cephalic presentation.    Placenta is posterior.   Cardiac activity is present at 133 bpm  MVP is 7.1 cm . TOREY 14.1 cm  BPP is 8/8.       IMPRESSION  IUP @ 38w5d  BPP 8/8    Delfina Cardozo MD      This was an ultrasound only encounter (no physician visit).  The ultrasound was read by Dr. Cardozo .        Note to patient and family  The 21st Century Cures Act makes medical notes available to patients in the interest of transparency.  However, please be advised that this is a medical document.  It is intended as [id number]-qcfw communication.  It is written and medical language may contain abbreviations or verbiage that are technical and unfamiliar.  It may appear blunt or direct.  Medical documents are intended to carry relevant information, facts as evident, and the clinical opinion of the practitioner.

## 2024-10-25 ENCOUNTER — ANESTHESIA EVENT (OUTPATIENT)
Dept: OBGYN UNIT | Facility: HOSPITAL | Age: 24
End: 2024-10-25
Payer: COMMERCIAL

## 2024-10-25 ENCOUNTER — HOSPITAL ENCOUNTER (INPATIENT)
Facility: HOSPITAL | Age: 24
LOS: 3 days | Discharge: HOME | End: 2024-10-28
Attending: OBSTETRICS & GYNECOLOGY | Admitting: OBSTETRICS & GYNECOLOGY
Payer: COMMERCIAL

## 2024-10-25 ENCOUNTER — ANESTHESIA (OUTPATIENT)
Dept: OBGYN UNIT | Facility: HOSPITAL | Age: 24
End: 2024-10-25
Payer: COMMERCIAL

## 2024-10-25 DIAGNOSIS — G89.18 POST-OP PAIN: Primary | ICD-10-CM

## 2024-10-25 PROBLEM — Z34.90 PREGNANCY (HCC): Status: ACTIVE | Noted: 2024-10-25

## 2024-10-25 LAB
ANTIBODY SCREEN: NEGATIVE
BASOPHILS # BLD AUTO: 0.02 X10(3) UL (ref 0–0.2)
BASOPHILS NFR BLD AUTO: 0.3 %
EOSINOPHIL # BLD AUTO: 0.05 X10(3) UL (ref 0–0.7)
EOSINOPHIL NFR BLD AUTO: 0.8 %
ERYTHROCYTE [DISTWIDTH] IN BLOOD BY AUTOMATED COUNT: 14.1 %
HCT VFR BLD AUTO: 32.7 %
HGB BLD-MCNC: 11.6 G/DL
IMM GRANULOCYTES # BLD AUTO: 0.03 X10(3) UL (ref 0–1)
IMM GRANULOCYTES NFR BLD: 0.5 %
LYMPHOCYTES # BLD AUTO: 1.7 X10(3) UL (ref 1–4)
LYMPHOCYTES NFR BLD AUTO: 28.5 %
MCH RBC QN AUTO: 33.1 PG (ref 26–34)
MCHC RBC AUTO-ENTMCNC: 35.5 G/DL (ref 31–37)
MCV RBC AUTO: 93.4 FL
MONOCYTES # BLD AUTO: 0.57 X10(3) UL (ref 0.1–1)
MONOCYTES NFR BLD AUTO: 9.5 %
NEUTROPHILS # BLD AUTO: 3.6 X10 (3) UL (ref 1.5–7.7)
NEUTROPHILS # BLD AUTO: 3.6 X10(3) UL (ref 1.5–7.7)
NEUTROPHILS NFR BLD AUTO: 60.4 %
PLATELET # BLD AUTO: 128 10(3)UL (ref 150–450)
PLATELETS.RETICULATED NFR BLD AUTO: 8.6 % (ref 0–7)
RBC # BLD AUTO: 3.5 X10(6)UL
RH BLOOD TYPE: POSITIVE
T PALLIDUM AB SER QL IA: NONREACTIVE
WBC # BLD AUTO: 6 X10(3) UL (ref 4–11)

## 2024-10-25 RX ORDER — NALBUPHINE HYDROCHLORIDE 10 MG/ML
2.5 INJECTION INTRAMUSCULAR; INTRAVENOUS; SUBCUTANEOUS
Status: DISCONTINUED | OUTPATIENT
Start: 2024-10-25 | End: 2024-10-25 | Stop reason: HOSPADM

## 2024-10-25 RX ORDER — HYDROMORPHONE HYDROCHLORIDE 1 MG/ML
INJECTION, SOLUTION INTRAMUSCULAR; INTRAVENOUS; SUBCUTANEOUS
Status: COMPLETED
Start: 2024-10-25 | End: 2024-10-25

## 2024-10-25 RX ORDER — SODIUM CHLORIDE, SODIUM LACTATE, POTASSIUM CHLORIDE, CALCIUM CHLORIDE 600; 310; 30; 20 MG/100ML; MG/100ML; MG/100ML; MG/100ML
125 INJECTION, SOLUTION INTRAVENOUS CONTINUOUS
Status: DISCONTINUED | OUTPATIENT
Start: 2024-10-25 | End: 2024-10-25 | Stop reason: HOSPADM

## 2024-10-25 RX ORDER — NALOXONE HYDROCHLORIDE 0.4 MG/ML
0.08 INJECTION, SOLUTION INTRAMUSCULAR; INTRAVENOUS; SUBCUTANEOUS
Status: DISCONTINUED | OUTPATIENT
Start: 2024-10-25 | End: 2024-10-26

## 2024-10-25 RX ORDER — ONDANSETRON 2 MG/ML
4 INJECTION INTRAMUSCULAR; INTRAVENOUS EVERY 6 HOURS PRN
Status: DISCONTINUED | OUTPATIENT
Start: 2024-10-25 | End: 2024-10-26

## 2024-10-25 RX ORDER — ACETAMINOPHEN 500 MG
1000 TABLET ORAL EVERY 6 HOURS
Status: DISCONTINUED | OUTPATIENT
Start: 2024-10-25 | End: 2024-10-28

## 2024-10-25 RX ORDER — ONDANSETRON 2 MG/ML
4 INJECTION INTRAMUSCULAR; INTRAVENOUS EVERY 6 HOURS PRN
Status: DISCONTINUED | OUTPATIENT
Start: 2024-10-25 | End: 2024-10-28

## 2024-10-25 RX ORDER — ONDANSETRON 2 MG/ML
4 INJECTION INTRAMUSCULAR; INTRAVENOUS EVERY 6 HOURS PRN
Status: DISCONTINUED | OUTPATIENT
Start: 2024-10-25 | End: 2024-10-25 | Stop reason: HOSPADM

## 2024-10-25 RX ORDER — CEFAZOLIN SODIUM IN 0.9 % NACL 3 G/100 ML
3 INTRAVENOUS SOLUTION, PIGGYBACK (ML) INTRAVENOUS ONCE
Status: COMPLETED | OUTPATIENT
Start: 2024-10-25 | End: 2024-10-25

## 2024-10-25 RX ORDER — ONDANSETRON 2 MG/ML
INJECTION INTRAMUSCULAR; INTRAVENOUS AS NEEDED
Status: DISCONTINUED | OUTPATIENT
Start: 2024-10-25 | End: 2024-10-25 | Stop reason: SURG

## 2024-10-25 RX ORDER — DEXAMETHASONE SODIUM PHOSPHATE 4 MG/ML
VIAL (ML) INJECTION AS NEEDED
Status: DISCONTINUED | OUTPATIENT
Start: 2024-10-25 | End: 2024-10-25 | Stop reason: SURG

## 2024-10-25 RX ORDER — LIDOCAINE HCL/EPINEPHRINE/PF 2%-1:200K
VIAL (ML) INJECTION AS NEEDED
Status: DISCONTINUED | OUTPATIENT
Start: 2024-10-25 | End: 2024-10-25 | Stop reason: SURG

## 2024-10-25 RX ORDER — DOCUSATE SODIUM 100 MG/1
100 CAPSULE, LIQUID FILLED ORAL
Status: DISCONTINUED | OUTPATIENT
Start: 2024-10-25 | End: 2024-10-28

## 2024-10-25 RX ORDER — DIPHENHYDRAMINE HYDROCHLORIDE 50 MG/ML
12.5 INJECTION INTRAMUSCULAR; INTRAVENOUS EVERY 4 HOURS PRN
Status: DISCONTINUED | OUTPATIENT
Start: 2024-10-25 | End: 2024-10-26

## 2024-10-25 RX ORDER — GABAPENTIN 300 MG/1
300 CAPSULE ORAL EVERY 8 HOURS PRN
Status: DISCONTINUED | OUTPATIENT
Start: 2024-10-25 | End: 2024-10-28

## 2024-10-25 RX ORDER — IBUPROFEN 600 MG/1
600 TABLET, FILM COATED ORAL EVERY 6 HOURS
Status: DISCONTINUED | OUTPATIENT
Start: 2024-10-26 | End: 2024-10-28

## 2024-10-25 RX ORDER — KETOROLAC TROMETHAMINE 30 MG/ML
INJECTION, SOLUTION INTRAMUSCULAR; INTRAVENOUS
Status: COMPLETED
Start: 2024-10-25 | End: 2024-10-25

## 2024-10-25 RX ORDER — BISACODYL 10 MG
10 SUPPOSITORY, RECTAL RECTAL ONCE AS NEEDED
Status: DISCONTINUED | OUTPATIENT
Start: 2024-10-25 | End: 2024-10-28

## 2024-10-25 RX ORDER — ENOXAPARIN SODIUM 100 MG/ML
40 INJECTION SUBCUTANEOUS DAILY
Status: DISCONTINUED | OUTPATIENT
Start: 2024-10-25 | End: 2024-10-27

## 2024-10-25 RX ORDER — SIMETHICONE 80 MG
80 TABLET,CHEWABLE ORAL 3 TIMES DAILY PRN
Status: DISCONTINUED | OUTPATIENT
Start: 2024-10-25 | End: 2024-10-28

## 2024-10-25 RX ORDER — ACETAMINOPHEN 500 MG
1000 TABLET ORAL ONCE
Status: COMPLETED | OUTPATIENT
Start: 2024-10-25 | End: 2024-10-25

## 2024-10-25 RX ORDER — ONDANSETRON 2 MG/ML
4 INJECTION INTRAMUSCULAR; INTRAVENOUS ONCE AS NEEDED
Status: DISCONTINUED | OUTPATIENT
Start: 2024-10-25 | End: 2024-10-25 | Stop reason: HOSPADM

## 2024-10-25 RX ORDER — SODIUM CHLORIDE 9 MG/ML
INJECTION, SOLUTION INTRAVENOUS CONTINUOUS
Status: DISCONTINUED | OUTPATIENT
Start: 2024-10-25 | End: 2024-10-26

## 2024-10-25 RX ORDER — SODIUM CHLORIDE, SODIUM LACTATE, POTASSIUM CHLORIDE, CALCIUM CHLORIDE 600; 310; 30; 20 MG/100ML; MG/100ML; MG/100ML; MG/100ML
INJECTION, SOLUTION INTRAVENOUS CONTINUOUS
Status: DISCONTINUED | OUTPATIENT
Start: 2024-10-25 | End: 2024-10-28

## 2024-10-25 RX ORDER — PHENYLEPHRINE HCL 10 MG/ML
VIAL (ML) INJECTION AS NEEDED
Status: DISCONTINUED | OUTPATIENT
Start: 2024-10-25 | End: 2024-10-25 | Stop reason: SURG

## 2024-10-25 RX ORDER — ROCURONIUM BROMIDE 10 MG/ML
INJECTION, SOLUTION INTRAVENOUS AS NEEDED
Status: DISCONTINUED | OUTPATIENT
Start: 2024-10-25 | End: 2024-10-25 | Stop reason: SURG

## 2024-10-25 RX ORDER — HYDROMORPHONE HYDROCHLORIDE 1 MG/ML
0.4 INJECTION, SOLUTION INTRAMUSCULAR; INTRAVENOUS; SUBCUTANEOUS EVERY 5 MIN PRN
Status: DISCONTINUED | OUTPATIENT
Start: 2024-10-25 | End: 2024-10-25 | Stop reason: HOSPADM

## 2024-10-25 RX ORDER — KETOROLAC TROMETHAMINE 30 MG/ML
30 INJECTION, SOLUTION INTRAMUSCULAR; INTRAVENOUS EVERY 6 HOURS
Status: DISPENSED | OUTPATIENT
Start: 2024-10-25 | End: 2024-10-26

## 2024-10-25 RX ORDER — DEXTROSE, SODIUM CHLORIDE, SODIUM LACTATE, POTASSIUM CHLORIDE, AND CALCIUM CHLORIDE 5; .6; .31; .03; .02 G/100ML; G/100ML; G/100ML; G/100ML; G/100ML
INJECTION, SOLUTION INTRAVENOUS CONTINUOUS PRN
Status: DISCONTINUED | OUTPATIENT
Start: 2024-10-25 | End: 2024-10-28

## 2024-10-25 RX ORDER — HYDROMORPHONE HYDROCHLORIDE 1 MG/ML
0.2 INJECTION, SOLUTION INTRAMUSCULAR; INTRAVENOUS; SUBCUTANEOUS EVERY 5 MIN PRN
Status: DISCONTINUED | OUTPATIENT
Start: 2024-10-25 | End: 2024-10-25 | Stop reason: HOSPADM

## 2024-10-25 RX ORDER — KETOROLAC TROMETHAMINE 30 MG/ML
30 INJECTION, SOLUTION INTRAMUSCULAR; INTRAVENOUS ONCE
Status: COMPLETED | OUTPATIENT
Start: 2024-10-25 | End: 2024-10-25

## 2024-10-25 RX ORDER — CITRIC ACID/SODIUM CITRATE 334-500MG
30 SOLUTION, ORAL ORAL ONCE
Status: COMPLETED | OUTPATIENT
Start: 2024-10-25 | End: 2024-10-25

## 2024-10-25 RX ADMIN — LIDOCAINE HCL/EPINEPHRINE/PF 3 ML: 2%-1:200K VIAL (ML) INJECTION at 10:06:00

## 2024-10-25 RX ADMIN — CEFAZOLIN SODIUM IN 0.9 % NACL 3 G: 3 G/100 ML INTRAVENOUS SOLUTION, PIGGYBACK (ML) INTRAVENOUS at 10:09:00

## 2024-10-25 RX ADMIN — DEXAMETHASONE SODIUM PHOSPHATE 4 MG: 4 MG/ML VIAL (ML) INJECTION at 10:29:00

## 2024-10-25 RX ADMIN — PHENYLEPHRINE HCL 50 MCG: 10 MG/ML VIAL (ML) INJECTION at 10:31:00

## 2024-10-25 RX ADMIN — ONDANSETRON 4 MG: 2 INJECTION INTRAMUSCULAR; INTRAVENOUS at 10:29:00

## 2024-10-25 RX ADMIN — PHENYLEPHRINE HCL 100 MCG: 10 MG/ML VIAL (ML) INJECTION at 10:15:00

## 2024-10-25 RX ADMIN — PHENYLEPHRINE HCL 50 MCG: 10 MG/ML VIAL (ML) INJECTION at 10:24:00

## 2024-10-25 RX ADMIN — LIDOCAINE HCL/EPINEPHRINE/PF 3 ML: 2%-1:200K VIAL (ML) INJECTION at 09:57:00

## 2024-10-25 RX ADMIN — PHENYLEPHRINE HCL 50 MCG: 10 MG/ML VIAL (ML) INJECTION at 10:21:00

## 2024-10-25 RX ADMIN — ROCURONIUM BROMIDE 15 MG: 10 INJECTION, SOLUTION INTRAVENOUS at 10:22:00

## 2024-10-25 NOTE — ANESTHESIA PROCEDURE NOTES
Airway  Date/Time: 10/25/2024 10:12 AM  Urgency: elective    Airway not difficult    General Information and Staff    Patient location during procedure: OR  Anesthesiologist: Jamal Valles DO  Performed: anesthesiologist   Performed by: Jamal Valles DO  Authorized by: Jamal Valles DO      Indications and Patient Condition  Indications for airway management: anesthesia  Sedation level: deep  Preoxygenated: yes  Patient position: sniffing  Mask difficulty assessment: 1 - vent by mask    Final Airway Details  Final airway type: endotracheal airway      Successful airway: ETT  Cuffed: yes   Successful intubation technique: Video laryngoscopy  Facilitating devices/methods: cricoid pressure, intubating stylet and rapid sequence intubation  Endotracheal tube insertion site: oral  Blade: GlideScope  Blade size: #3  ETT size (mm): 7.0    Cormack-Lehane Classification: grade I - full view of glottis  Placement verified by: capnometry   Measured from: lips  ETT to lips (cm): 20  Number of attempts at approach: 1  Number of other approaches attempted: 0
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
## 2024-10-25 NOTE — ANESTHESIA PROCEDURE NOTES
Labor Analgesia    Date/Time: 10/25/2024 9:47 AM    Performed by: Jamal Valles DO  Authorized by: Jamal Valles DO      General Information and Staff    Start Time:  10/25/2024 9:47 AM  End Time:  10/25/2024 9:57 AM  Anesthesiologist:  Jamal Valles DO  Performed by:  Anesthesiologist  Patient Location:  OB  Site Identification: surface landmarks    Reason for Block: labor epidural    Preanesthetic Checklist: patient identified, IV checked, site marked, risks and benefits discussed, monitors and equipment checked, pre-op evaluation, timeout performed, IV bolus, anesthesia consent and sterile technique used      Procedure Details    Patient Position:  Sitting  Prep: ChloraPrep    Monitoring:  Heart rate and continuous pulse ox  Approach:  Midline    Epidural Needle    Injection Technique:   Needle Type:  Tuohy  Needle Gauge:  17 G  Needle Length:  3.375 in  Location:  L3-4    Spinal Needle    Needle Type:   Needle Insertion Depth:  10    Catheter    Catheter Type:  End hole  Catheter Size:  19 G  Catheter at Skin Depth:  14.5  Test Dose:  Negative    Assessment      Additional Comments     Epidural Start Time 09:47    Sterile technique  Chloraprep skin prep  1% Lidocaine skin infiltration  Negative for CSF, heme, and paresthesias.    3mL 2% Lidocaine with EPI test dose.  Test Dose negative for CV/CNS effects    Negative heme with test dose dosing.  However, with subsequent bolus dosing of epidural, tinge of heme noted on aspiration.  Decision made not to use catheter for epidural anesthesia.

## 2024-10-25 NOTE — PROGRESS NOTES
Pt is a 24 year old female admitted to [facility]/TR5-A.     Chief Complaint   Patient presents with    Scheduled       Pt is  39w0d intra-uterine pregnancy.  History obtained, consents signed. Oriented to room, staff, and plan of care.

## 2024-10-25 NOTE — OPERATIVE REPORT
[facility]  Post-Operative Note    Carmela Bell Patient Status:  Inpatient    2000 MRN [id number]   Location [facility] LABOR & DELIVERY Attending Shoshana Horvath MD   Hosp Day # 0 PCP ILENE MOODY     Pre-operative Diagnosis: 39 week 0 day pregnancy, h/o c/s, morbid obesity, ITP    Post-operative Diagnosis: same as above    Procedure: LTCS     Attending: Dr. Horvath    Assistant: Dr. Maguire  The surgical assistant provided aid in exposure, hemostasis, closure, and other operative technical functions to aid the surgeon in carrying out a safe operation and optimize results      Anesthesia: general    Findings:  Viable female, cephalic position. Apgars of 8/9, weighing 7 pounds and 0 ounces. Placenta removed. Uterus, tubes, and ovaries appeared normal.      Estimated Blood Loss: 700 ml    Specimens: cord blood    Complications: none    Condition: stable    Procedure:  The risks, benefits, complications, treatment options, and expected outcomes were discussed with the patient.  The patient concurred with the proposed plan, giving informed consent. After informed consent was made, the patient was taken back to the operating room. A Time Out was held and the above information confirmed.    After induction of general anesthesia, the patient was prepped and draped in the usual sterile manner. A pfannenstiel skin incision was then made and carried down through the subcutaneous tissue to the fascia.  The fascia was nicked in the midline.  Extensive scarring was noted. This incision was then extended laterally using the Walker scissors. The lower aspect of the fascial incision was grasped with kochers and elevated.  The rectus muscles were dissected off using the Walker scissors.  This was repeated at the superior portion of the fascial incision. The peritoneum was entered using the Metzenbaum scissors. This incision was then extended superiorly and inferiorly with good visualization of the  bladder. The utero-vesical peritoneum was entered using the Metzenbaum scissors.   The bladder blade was inserted. A low transverse uterine incision was made.  The incision was extended using blunt dissection.  The infant's head was delivered atraumatically using the kiwi vacuum, no pop offs.  The remainder of the infant was delivered without difficulty. Nuchal cord x 1. After the umbilical cord was clamped and cut.  The cord blood was obtained for evaluation. The placenta was removed and appeared normal. The uterus was exteriorized and cleared of all clots and debris.    The uterine incision was closed with running locked sutures of 0-vicryl. A second layer of the same suture was then placed. Hemostasis was observed. The uterus was returned to the abdomen and thorough irrigation of the abdomen and pelvis was performed.  The fascia was then reapproximated with running sutures of 0-vicryl. The subcutaneous fat was closed with 2-0 plain. The skin was reapproximated with 4-0 vicryl in a subcutaneous manner.  A wound vac was placed over the incision due to her h/o wound infection with her previous c/s. The patient was taken to the recovery room in stable and satisfactory condition.    All instrument, sponge, and needle counts were correct prior the abdominal closure and at the conclusion of the case.    Shoshana Horvath MD  10/25/2024  10:52 AM

## 2024-10-25 NOTE — PLAN OF CARE
Problem: BIRTH - VAGINAL/ SECTION  Goal: Fetal and maternal status remain reassuring during the birth process  Description: INTERVENTIONS:  - Monitor vital signs  - Monitor fetal heart rate  - Monitor uterine activity  - Monitor labor progression (vaginal delivery)  - DVT prophylaxis (C/S delivery)  - Surgical antibiotic prophylaxis (C/S delivery)  Outcome: Progressing     Problem: PAIN - ADULT  Goal: Verbalizes/displays adequate comfort level or patient's stated pain goal  Description: INTERVENTIONS:  - Encourage pt to monitor pain and request assistance  - Assess pain using appropriate pain scale  - Administer analgesics based on type and severity of pain and evaluate response  - Implement non-pharmacological measures as appropriate and evaluate response  - Consider cultural and social influences on pain and pain management  - Manage/alleviate anxiety  - Utilize distraction and/or relaxation techniques  - Monitor for opioid side effects  - Notify MD/LIP if interventions unsuccessful or patient reports new pain  - Anticipate increased pain with activity and pre-medicate as appropriate  Outcome: Progressing     Problem: ANXIETY  Goal: Will report anxiety at manageable levels  Description: INTERVENTIONS:  - Administer medication as ordered  - Teach and rehearse alternative coping skills  - Provide emotional support with 1:1 interaction with staff  Outcome: Progressing     Problem: Patient/Family Goals  Goal: Patient/Family Long Term Goal  Description: Patient's Long Term Goal: Pain management throughout hospital stay and postpartum.     Interventions:  -   - See additional Care Plan goals for specific interventions  Outcome: Progressing  Goal: Patient/Family Short Term Goal  Description: Patient's Short Term Goal: Healthy delivery of female .     Interventions:   -   - See additional Care Plan goals for specific interventions  Outcome: Progressing

## 2024-10-25 NOTE — PROGRESS NOTES
Pt transferred to post partum unit via cart holding infant. Pt accompanied by her mother. Pt transferred with all personal belongings, voicing no complaints upon transfer. Report to be given upon arrival on post partum unit. Pt in stable condition. Baby bands matched with receiving RN and pt and her mother. All care relinquished at this time.

## 2024-10-25 NOTE — ANESTHESIA POSTPROCEDURE EVALUATION
[facility]    Carmela Blancaki Patient Status:  Inpatient   Age/Gender 24 year old female MRN [id number]   Location [facility] LABOR & DELIVERY Attending Shoshana Horvath MD    Day # 0 PCP ILENE MOODY       Anesthesia Post-op Note     SECTION    Procedure Summary       Date: 10/25/24 Room / Location:  L+D OR   L+D OR    Anesthesia Start: 901 Anesthesia Stop:     Procedure:  SECTION (Abdomen) Diagnosis: (39 week 0 day pregnancy, h/o c/s, morbid obesity, ITP)    Surgeons: Shoshana Horvath MD Anesthesiologist: Jamal Valles DO    Anesthesia Type: spinal ASA Status: 2            Anesthesia Type: spinal    Vitals Value Taken Time   /82 10/25/24 1105   Temp 97.4 10/25/24 1119   Pulse 61 10/25/24 1118   Resp 20 10/25/24 1118   SpO2 98 % 10/25/24 1118   Vitals shown include unfiled device data.    Patient Location: PACU    Anesthesia Type: general    Airway Patency: patent and extubated    Postop Pain Control: inadequate, being treated    Mental Status: mildly sedated but able to meaningfully participate in the post-anesthesia evaluation    Nausea/Vomiting: none    Cardiopulmonary/Hydration status: stable euvolemic    Complications: no apparent anesthesia related complications    Postop vital signs: stable    Dental Exam: Unchanged from Preop    Patient to be discharged from PACU when criteria met.

## 2024-10-25 NOTE — ANESTHESIA PREPROCEDURE EVALUATION
PRE-OP EVALUATION    Patient Name: Carmela Bell    Admit Diagnosis: pregnancy  Pregnancy (HCC)    Pre-op Diagnosis: * No pre-op diagnosis entered *     SECTION    Anesthesia Procedure:  SECTION (Abdomen)    Surgeons and Role:     * Shoshana Horvath MD - Primary    Pre-op vitals reviewed.  Temp: 97.8 °F (36.6 °C)  Pulse: 60  Resp: 18  BP: 132/74     Body mass index is 39.43 kg/m².    Current medications reviewed.  Hospital Medications:   [COMPLETED] lactated ringers IV bolus 1,000 mL  1,000 mL Intravenous Once    Followed by    lactated ringers infusion  125 mL/hr Intravenous Continuous    [COMPLETED] acetaminophen (Tylenol Extra Strength) tab 1,000 mg  1,000 mg Oral Once    ondansetron (Zofran) 4 MG/2ML injection 4 mg  4 mg Intravenous Q6H PRN    [COMPLETED] sodium citrate-citric acid (Bicitra) 500-334 MG/5ML oral solution 30 mL  30 mL Oral Once    oxyTOCIN in sodium chloride 0.9% (Pitocin) 30 Units/500mL infusion premix  62.5-900 herminio-units/min Intravenous Continuous    ceFAZolin (Ancef) 3 g in sodium chloride 0.9% 100mL IVPB premix  3 g Intravenous Once       Outpatient Medications:   Prescriptions Prior to Admission[1]    Allergies: Mastisol adhesive and Nystatin      Anesthesia Evaluation    Patient summary reviewed.    Anesthetic Complications  (-) history of anesthetic complications         GI/Hepatic/Renal                                 Cardiovascular  Comment: Patient denies hx of chest pain/tightness, MI, or cardiac disease.        Exercise tolerance: good     MET: >4    (+) obesity                         (-) angina     (-) ADLER         Endo/Other                                  Pulmonary      (+) asthma         (-) shortness of breath     (-) sleep apnea       Neuro/Psych  Comment: PTSD      (+) depression  (+) anxiety                      No history of anticoagulant use or bleeding diatheses.          Past Surgical History:   Procedure Laterality Date    Abdominoplasty       Adenoidectomy       delivery+postpartum care  2018    Cholecystectomy  2018    Lap sleeve gastrectomy  2019    Myringotomy, laser-assisted Bilateral     Other surgical history Bilateral     2018- ankle surgery    Tonsillectomy       Social History     Socioeconomic History    Marital status: Single   Tobacco Use    Smoking status: Never    Smokeless tobacco: Never   Vaping Use    Vaping status: Never Used   Substance and Sexual Activity    Alcohol use: Not Currently    Drug use: No    Sexual activity: Yes     Partners: Male     Birth control/protection: I.U.D.     Comment: Kyleena     History   Drug Use No     Available pre-op labs reviewed.  Lab Results   Component Value Date    WBC 6.0 10/25/2024    RBC 3.50 (L) 10/25/2024    HGB 11.6 (L) 10/25/2024    HCT 32.7 (L) 10/25/2024    MCV 93.4 10/25/2024    MCH 33.1 10/25/2024    MCHC 35.5 10/25/2024    RDW 14.1 10/25/2024    .0 (L) 10/25/2024     Lab Results   Component Value Date     10/15/2024    K 3.4 (L) 10/15/2024     10/15/2024    CO2 27.0 10/15/2024    BUN <5 (L) 10/15/2024    CREATSERUM 0.46 (L) 10/15/2024    GLU 88 10/15/2024    CA 8.4 (L) 10/15/2024            Airway      Mallampati: II  Mouth opening: >3 FB  TM distance: 4 - 6 cm  Neck ROM: full Cardiovascular    Cardiovascular exam normal.  Rhythm: regular  Rate: normal     Dental    Dentition appears grossly intact         Pulmonary    Pulmonary exam normal.  Breath sounds clear to auscultation bilaterally.               Other findings              ASA: 2   Plan: spinal  NPO status verified and patient meets guidelines.    Post-procedure pain management plan discussed with surgeon and patient.      Plan/risks discussed with: patient                Present on Admission:  **None**             [1]   Medications Prior to Admission   Medication Sig Dispense Refill Last Dose/Taking    ferrous sulfate 325 (65 FE) MG Oral Tab EC Take 1 tablet (325 mg total) by mouth daily with  breakfast.   10/24/2024    ondansetron (ZOFRAN) 4 mg tablet Take 1 tablet (4 mg total) by mouth every 8 (eight) hours as needed for Nausea.   10/24/2024    metoclopramide 2.5 mg Oral Tab Take 1 Partial Tablet (2.5 mg total) by mouth 3 (three) times daily before meals.   Past Week    iron polysacch jitya-I01--0.025-1 MG Oral Cap Take 1 capsule by mouth daily.   10/24/2024    prenatal vitamin with DHA 27-0.8-228 MG Oral Cap Take 1 capsule by mouth daily.   10/24/2024 Evening    enoxaparin 150 MG/ML Injection Solution Prefilled Syringe Inject 0.8 mL (120 mg total) into the skin every 12 (twelve) hours.   More than a month

## 2024-10-25 NOTE — ANESTHESIA PROCEDURE NOTES
Spinal Block    Date/Time: 10/25/2024 9:04 AM    Performed by: Jamal Valles DO  Authorized by: Jamal Valles DO      General Information and Staff    Start Time:  10/25/2024 9:04 AM  End Time:  10/25/2024 9:44 AM  Anesthesiologist:  Jamal Valles DO  Performed by:  Anesthesiologist  Patient Location:  OR  Site identification: surface landmarks    Reason for Block: surgical anesthesia    Preanesthetic Checklist: patient identified, IV checked, risks and benefits discussed, monitors and equipment checked, pre-op evaluation, timeout performed, anesthesia consent and sterile technique used      Procedure Details    Patient Position:  Sitting  Prep: ChloraPrep    Monitoring:  Cardiac monitor, heart rate and continuous pulse ox  Approach:  Midline  Location:  L3-4  Injection Technique:  Single-shot    Needle    Needle Type:  Sprotte  Needle Gauge:  24 G  Needle Length:  3.5 in    Assessment    Sensory Level:   Events: well tolerated      Additional Comments     Sterile technique  Chloraprep skin prep  1% Lidocaine skin infiltration  24 ga Sprotte introduced at L 3-4    Multiple attempts made to place spinal.  However, unable to obtain CSF with prolonged effort.  Decision made to attempt placement of an epidural catheter to use for anesthetic.

## 2024-10-26 LAB
BASOPHILS # BLD AUTO: 0.03 X10(3) UL (ref 0–0.2)
BASOPHILS NFR BLD AUTO: 0.3 %
EOSINOPHIL # BLD AUTO: 0.05 X10(3) UL (ref 0–0.7)
EOSINOPHIL NFR BLD AUTO: 0.5 %
ERYTHROCYTE [DISTWIDTH] IN BLOOD BY AUTOMATED COUNT: 14.3 %
HCT VFR BLD AUTO: 28.7 %
HGB BLD-MCNC: 9.5 G/DL
IMM GRANULOCYTES # BLD AUTO: 0.04 X10(3) UL (ref 0–1)
IMM GRANULOCYTES NFR BLD: 0.4 %
LYMPHOCYTES # BLD AUTO: 2.02 X10(3) UL (ref 1–4)
LYMPHOCYTES NFR BLD AUTO: 22.1 %
MCH RBC QN AUTO: 32.8 PG (ref 26–34)
MCHC RBC AUTO-ENTMCNC: 33.1 G/DL (ref 31–37)
MCV RBC AUTO: 99 FL
MONOCYTES # BLD AUTO: 0.85 X10(3) UL (ref 0.1–1)
MONOCYTES NFR BLD AUTO: 9.3 %
NEUTROPHILS # BLD AUTO: 6.14 X10 (3) UL (ref 1.5–7.7)
NEUTROPHILS # BLD AUTO: 6.14 X10(3) UL (ref 1.5–7.7)
NEUTROPHILS NFR BLD AUTO: 67.4 %
PLATELET # BLD AUTO: 108 10(3)UL (ref 150–450)
RBC # BLD AUTO: 2.9 X10(6)UL
WBC # BLD AUTO: 9.1 X10(3) UL (ref 4–11)

## 2024-10-26 RX ORDER — OXYCODONE HYDROCHLORIDE 5 MG/1
5 TABLET ORAL EVERY 4 HOURS PRN
Status: DISCONTINUED | OUTPATIENT
Start: 2024-10-26 | End: 2024-10-28

## 2024-10-26 RX ORDER — FERROUS SULFATE 325(65) MG
325 TABLET, DELAYED RELEASE (ENTERIC COATED) ORAL
Status: DISCONTINUED | OUTPATIENT
Start: 2024-10-26 | End: 2024-10-28

## 2024-10-26 NOTE — PROGRESS NOTES
Postpartum Progress Note    SUBJECTIVE:    Post-op day #1 s/p repeat  section under general anesthesia    No overnight events.  No complaints.  Has been out of bed, tolerating PO, pritchard in place, passing flatus.      OBJECTIVE:    Vital signs in last 24 hours:  Temp:  [97.4 °F (36.3 °C)-98 °F (36.7 °C)] 97.6 °F (36.4 °C)  Pulse:  [47-88] 51  Resp:  [13-22] 18  BP: (100-126)/() 110/80  SpO2:  [97 %-100 %] 100 %  Input/Output:    Intake/Output Summary (Last 24 hours) at 10/26/2024 1045  Last data filed at 10/26/2024 0753  Gross per 24 hour   Intake 3705 ml   Output 1715 ml   Net 1990 ml       Gen: appears well, nad  Abd: soft, appropriate post-op tenderness, fundus firm below umbilicus  Inc: Wound vac in place  Jaz: minimal lochia  Ext: nontender, minimal edema    Recent Labs   Lab 10/25/24  0718 10/26/24  0815   RBC 3.50* 2.90*   HGB 11.6* 9.5*   HCT 32.7* 28.7*   MCV 93.4 99.0   MCH 33.1 32.8   MCHC 35.5 33.1   RDW 14.1 14.3   NEPRELIM 3.60 6.14   WBC 6.0 9.1   .0* 108.0*         ASSESSMENT/PLAN:    POD #1 s/p repeat  section under GETA  Will transition to PO meds today  Provoked DVT in pregnancy: will plan for 6 week PP DVT prophylaxis  Recovering well  Continue current care    Mae Orr MD  10/26/2024  10:45 AM

## 2024-10-26 NOTE — PROGRESS NOTES
Labor Analgesia Follow Up Note    Patient underwent General anesthesia for caesarian section,    Placenta Date/Time: 10/25/2024 10:16 AM    Delivery Date/Time:: 10/25/2024  10:15 AM    /80 (BP Location: Right arm)   Pulse 51   Temp 97.6 °F (36.4 °C) (Oral)   Resp 18   Ht 1.753 m (5' 9\")   Wt 121.1 kg (267 lb)   SpO2 100%   Breastfeeding Yes   BMI 39.43 kg/m²     Patient reporting frontal headache that is worse with laying down and better sitting. Patient is a 'heavy' caffeine drinker.    Assessment:  Patient seen and  reported mild to moderate headache that is not likely PDPH. Encouraged drinking fluid, some caffeine intake with discretion from OB and pediatrian.     Thank you for asking us to participate in the care of your patient.

## 2024-10-26 NOTE — PLAN OF CARE
Problem: Patient/Family Goals  Goal: Patient/Family Long Term Goal  Description: Patient's Long Term Goal: Pain management throughout hospital stay and postpartum.     Interventions:  -   - See additional Care Plan goals for specific interventions  Outcome: Progressing  Goal: Patient/Family Short Term Goal  Description: Patient's Short Term Goal: Healthy delivery of female .     Interventions:   -   - See additional Care Plan goals for specific interventions  Outcome: Progressing     Problem: POSTPARTUM  Goal: Long Term Goal:Experiences normal postpartum course  Description: INTERVENTIONS:  - Assess and monitor vital signs and lab values.  - Assess fundus and lochia.  - Provide ice/sitz baths for perineum discomfort.  - Monitor healing of incision/episiotomy/laceration, and assess for signs and symptoms of infection and hematoma.  - Assess bladder function and monitor for bladder distention.  - Provide/instruct/assist with pericare as needed.  - Provide VTE prophylaxis as needed.  - Monitor bowel function.  - Encourage ambulation and provide assistance as needed.  - Assess and monitor emotional status and provide social service/psych resources as needed.  - Utilize standard precautions and use personal protective equipment as indicated. Ensure aseptic care of all intravenous lines and invasive tubes/drains.  - Obtain immunization and exposure to communicable diseases history.  Outcome: Progressing  Goal: Optimize infant feeding at the breast  Description: INTERVENTIONS:  - Initiate breast feeding within first hour after birth.   - Monitor effectiveness of current breast feeding efforts.  - Assess support systems available to mother/family.  - Identify cultural beliefs/practices regarding lactation, letdown techniques, maternal food preferences.  - Assess mother's knowledge and previous experience with breast feeding.  - Provide information as needed about early infant feeding cues (e.g., rooting, lip  smacking, sucking fingers/hand) versus late cue of crying.  - Discuss/demonstrate breast feeding aids (e.g., infant sling, nursing footstool/pillows, and breast pumps).  - Encourage mother/other family members to express feelings/concerns, and actively listen.  - Educate father/SO about benefits of breast feeding and how to manage common lactation challenges.  - Recommend avoidance of specific medications or substances incompatible with breast feeding.  - Assess and monitor for signs of nipple pain/trauma.  - Instruct and provide assistance with proper latch.  - Review techniques for milk expression (breast pumping) and storage of breast milk. Provide pumping equipment/supplies, instructions and assistance, as needed.  - Encourage rooming-in and breast feeding on demand.  - Encourage skin-to-skin contact.  - Provide LC support as needed.  - Assess for and manage engorgement.  - Provide breast feeding education handouts and information on community breast feeding support.   Outcome: Progressing  Goal: Establishment of adequate milk supply with medication/procedure interruptions  Description: INTERVENTIONS:  - Review techniques for milk expression (breast pumping).   - Provide pumping equipment/supplies, instructions, and assistance until it is safe to breastfeed infant.  Outcome: Progressing  Goal: Appropriate maternal -  bonding  Description: INTERVENTIONS:  - Assess caregiver- interactions.  - Assess caregiver's emotional status and coping mechanisms.  - Encourage caregiver to participate in  daily care.  - Assess support systems available to mother/family.  - Provide /case management support as needed.  Outcome: Progressing

## 2024-10-27 ENCOUNTER — APPOINTMENT (OUTPATIENT)
Dept: ULTRASOUND IMAGING | Facility: HOSPITAL | Age: 24
End: 2024-10-27
Attending: OBSTETRICS & GYNECOLOGY
Payer: COMMERCIAL

## 2024-10-27 LAB
DEPRECATED HBV CORE AB SER IA-ACNC: 8 NG/ML
IRON SATN MFR SERPL: 22 %
IRON SERPL-MCNC: 99 UG/DL
TOTAL IRON BINDING CAPACITY: 445 UG/DL (ref 250–425)
TRANSFERRIN SERPL-MCNC: 372 MG/DL (ref 250–380)

## 2024-10-27 PROCEDURE — 99254 IP/OBS CNSLTJ NEW/EST MOD 60: CPT | Performed by: HOSPITALIST

## 2024-10-27 PROCEDURE — 93970 EXTREMITY STUDY: CPT | Performed by: OBSTETRICS & GYNECOLOGY

## 2024-10-27 RX ORDER — ENOXAPARIN SODIUM 150 MG/ML
1 INJECTION SUBCUTANEOUS EVERY 12 HOURS SCHEDULED
Status: DISCONTINUED | OUTPATIENT
Start: 2024-10-27 | End: 2024-10-28

## 2024-10-27 NOTE — PROGRESS NOTES
Postpartum Progress Note    SUBJECTIVE:    Post-op day #2 s/p repeat  section    No overnight events.  No complaints.  Ambulating, tolerating PO, voiding, passing flatus.  She does report some tenderness in her right lower calf     OBJECTIVE:    Vital signs in last 24 hours:  Temp:  [97.6 °F (36.4 °C)-98.5 °F (36.9 °C)] 97.8 °F (36.6 °C)  Pulse:  [63-69] 67  Resp:  [16] 16  BP: ()/(60-76) 102/76  Input/Output:    Intake/Output Summary (Last 24 hours) at 10/27/2024 1126  Last data filed at 10/26/2024 1500  Gross per 24 hour   Intake 1 ml   Output 550 ml   Net -549 ml       Gen: appears well, nad  Abd: soft, appropriate post-op tenderness, fundus firm below umbilicus  Inc: c/d/i with sutures/steris  Jaz: minimal lochia  Ext: nontender, mild edema    Recent Labs   Lab 10/25/24  0718 10/26/24  0815   RBC 3.50* 2.90*   HGB 11.6* 9.5*   HCT 32.7* 28.7*   MCV 93.4 99.0   MCH 33.1 32.8   MCHC 35.5 33.1   RDW 14.1 14.3   NEPRELIM 3.60 6.14   WBC 6.0 9.1   .0* 108.0*         ASSESSMENT/PLAN:    POD #2 s/p repeat  section  Recovering well  Continue current care  LE dopplers ordered to r/o VTE given history of VTE  Patient requesting discharge home today pending dopplers; discharge instructions reviewed, follow up in office in 1 week for wound vac removal  Will plan for lovenox 40mg daily for 6 weeks PP    Mae Orr MD  10/27/2024  11:26 AM
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
## 2024-10-27 NOTE — PLAN OF CARE
Problem: Patient/Family Goals  Goal: Patient/Family Long Term Goal  Description: Patient's Long Term Goal: Pain management throughout hospital stay and postpartum.     Interventions:  -   - See additional Care Plan goals for specific interventions  10/26/2024 2307 by Vickie Miller RN  Outcome: Progressing  10/26/2024 2306 by Vickie Miller RN  Outcome: Progressing  Goal: Patient/Family Short Term Goal  Description: Patient's Short Term Goal: Healthy delivery of female .     Interventions:   -   - See additional Care Plan goals for specific interventions  10/26/2024 2307 by Vickie Miller RN  Outcome: Progressing  10/26/2024 2306 by Vickie Miller RN  Outcome: Progressing     Problem: POSTPARTUM  Goal: Long Term Goal:Experiences normal postpartum course  Description: INTERVENTIONS:  - Assess and monitor vital signs and lab values.  - Assess fundus and lochia.  - Provide ice/sitz baths for perineum discomfort.  - Monitor healing of incision/episiotomy/laceration, and assess for signs and symptoms of infection and hematoma.  - Assess bladder function and monitor for bladder distention.  - Provide/instruct/assist with pericare as needed.  - Provide VTE prophylaxis as needed.  - Monitor bowel function.  - Encourage ambulation and provide assistance as needed.  - Assess and monitor emotional status and provide social service/psych resources as needed.  - Utilize standard precautions and use personal protective equipment as indicated. Ensure aseptic care of all intravenous lines and invasive tubes/drains.  - Obtain immunization and exposure to communicable diseases history.  10/26/2024 2307 by Vickie Miller RN  Outcome: Progressing  10/26/2024 2306 by Vickie Miller RN  Outcome: Progressing  Goal: Optimize infant feeding at the breast  Description: INTERVENTIONS:  - Initiate breast feeding within first hour after birth.   - Monitor effectiveness of current breast feeding efforts.  - Assess support systems  available to mother/family.  - Identify cultural beliefs/practices regarding lactation, letdown techniques, maternal food preferences.  - Assess mother's knowledge and previous experience with breast feeding.  - Provide information as needed about early infant feeding cues (e.g., rooting, lip smacking, sucking fingers/hand) versus late cue of crying.  - Discuss/demonstrate breast feeding aids (e.g., infant sling, nursing footstool/pillows, and breast pumps).  - Encourage mother/other family members to express feelings/concerns, and actively listen.  - Educate father/SO about benefits of breast feeding and how to manage common lactation challenges.  - Recommend avoidance of specific medications or substances incompatible with breast feeding.  - Assess and monitor for signs of nipple pain/trauma.  - Instruct and provide assistance with proper latch.  - Review techniques for milk expression (breast pumping) and storage of breast milk. Provide pumping equipment/supplies, instructions and assistance, as needed.  - Encourage rooming-in and breast feeding on demand.  - Encourage skin-to-skin contact.  - Provide LC support as needed.  - Assess for and manage engorgement.  - Provide breast feeding education handouts and information on community breast feeding support.   10/26/2024 2307 by Vickie Miller, RN  Outcome: Progressing  10/26/2024 2306 by Vickie Miller, RN  Outcome: Progressing  Goal: Establishment of adequate milk supply with medication/procedure interruptions  Description: INTERVENTIONS:  - Review techniques for milk expression (breast pumping).   - Provide pumping equipment/supplies, instructions, and assistance until it is safe to breastfeed infant.  10/26/2024 2307 by Vickie Miller, RN  Outcome: Progressing  10/26/2024 2306 by Vickie Miller, RN  Outcome: Progressing  Goal: Appropriate maternal -  bonding  Description: INTERVENTIONS:  - Assess caregiver- interactions.  - Assess caregiver's  emotional status and coping mechanisms.  - Encourage caregiver to participate in  daily care.  - Assess support systems available to mother/family.  - Provide /case management support as needed.  10/26/2024 2307 by Vickie Miller, RN  Outcome: Progressing  10/26/2024 2306 by Vickie Miller, RN  Outcome: Progressing   Sat with patient and updated patient and family on plan of care. Pain medication discussed and answered all questions. Vitals are WNL. Breastfeeding on demand and breastfeeding successfully.  Supplementing with formula as needed. Initiated pumping to protect milk supply. Lochia WNL and fundus is firm. Will call RN with any questions.

## 2024-10-28 VITALS
TEMPERATURE: 98 F | DIASTOLIC BLOOD PRESSURE: 73 MMHG | WEIGHT: 267 LBS | BODY MASS INDEX: 39.55 KG/M2 | OXYGEN SATURATION: 100 % | HEIGHT: 69 IN | HEART RATE: 75 BPM | SYSTOLIC BLOOD PRESSURE: 117 MMHG | RESPIRATION RATE: 14 BRPM

## 2024-10-28 PROBLEM — I82.90 VENOUS THROMBOEMBOLISM: Status: ACTIVE | Noted: 2024-10-28

## 2024-10-28 LAB
BASOPHILS # BLD AUTO: 0.02 X10(3) UL (ref 0–0.2)
BASOPHILS NFR BLD AUTO: 0.2 %
EOSINOPHIL # BLD AUTO: 0.08 X10(3) UL (ref 0–0.7)
EOSINOPHIL NFR BLD AUTO: 1 %
ERYTHROCYTE [DISTWIDTH] IN BLOOD BY AUTOMATED COUNT: 14.6 %
FOLATE SERPL-MCNC: 5.2 NG/ML (ref 5.4–?)
HCT VFR BLD AUTO: 30.6 %
HGB BLD-MCNC: 10.5 G/DL
IMM GRANULOCYTES # BLD AUTO: 0.05 X10(3) UL (ref 0–1)
IMM GRANULOCYTES NFR BLD: 0.6 %
LYMPHOCYTES # BLD AUTO: 2.19 X10(3) UL (ref 1–4)
LYMPHOCYTES NFR BLD AUTO: 26.4 %
MCH RBC QN AUTO: 33.4 PG (ref 26–34)
MCHC RBC AUTO-ENTMCNC: 34.3 G/DL (ref 31–37)
MCV RBC AUTO: 97.5 FL
MONOCYTES # BLD AUTO: 0.67 X10(3) UL (ref 0.1–1)
MONOCYTES NFR BLD AUTO: 8.1 %
NEUTROPHILS # BLD AUTO: 5.3 X10 (3) UL (ref 1.5–7.7)
NEUTROPHILS # BLD AUTO: 5.3 X10(3) UL (ref 1.5–7.7)
NEUTROPHILS NFR BLD AUTO: 63.7 %
PLATELET # BLD AUTO: 144 10(3)UL (ref 150–450)
RBC # BLD AUTO: 3.14 X10(6)UL
VIT B12 SERPL-MCNC: 187 PG/ML (ref 211–911)
WBC # BLD AUTO: 8.3 X10(3) UL (ref 4–11)

## 2024-10-28 PROCEDURE — 99232 SBSQ HOSP IP/OBS MODERATE 35: CPT | Performed by: HOSPITALIST

## 2024-10-28 PROCEDURE — 99255 IP/OBS CONSLTJ NEW/EST HI 80: CPT | Performed by: INTERNAL MEDICINE

## 2024-10-28 RX ORDER — MELATONIN
1000 DAILY
Status: DISCONTINUED | OUTPATIENT
Start: 2024-10-28 | End: 2024-10-28

## 2024-10-28 RX ORDER — ENOXAPARIN SODIUM 150 MG/ML
1 INJECTION SUBCUTANEOUS 2 TIMES DAILY
Qty: 60 EACH | Refills: 1 | Status: SHIPPED | OUTPATIENT
Start: 2024-10-28 | End: 2024-11-04

## 2024-10-28 RX ORDER — GABAPENTIN 300 MG/1
300 CAPSULE ORAL EVERY 8 HOURS PRN
Qty: 21 CAPSULE | Refills: 0 | Status: SHIPPED | OUTPATIENT
Start: 2024-10-28 | End: 2024-11-04

## 2024-10-28 RX ORDER — OXYCODONE HYDROCHLORIDE 5 MG/1
5 TABLET ORAL EVERY 6 HOURS PRN
Qty: 10 TABLET | Refills: 0 | Status: SHIPPED | OUTPATIENT
Start: 2024-10-28 | End: 2024-11-04

## 2024-10-28 RX ORDER — FOLIC ACID 1 MG/1
1 TABLET ORAL DAILY
Status: DISCONTINUED | OUTPATIENT
Start: 2024-10-28 | End: 2024-10-28

## 2024-10-28 RX ORDER — FOLIC ACID 1 MG/1
1 TABLET ORAL DAILY
Qty: 90 TABLET | Refills: 0 | Status: SHIPPED | OUTPATIENT
Start: 2024-10-28 | End: 2025-01-26

## 2024-10-28 NOTE — PROGRESS NOTES
Postop Day 3    Pt without complaints.     Temp: 98 °F (36.7 °C)  Pulse: 75  Resp: 14  BP: 117/73  abd  soft, NT, ND, fundus firm below umbilicus, incision C/D/I  extr  trace edema, no calf tenderness    Impression: POD#3, right LE DVT  Plan:  Discharge instructions reviewed.    Home today if ok with hematology; lovenox 120 mg bid    Continue tylenol and gabapentin; hold motrin due to therapeutic lovenox; add oxycontin 5 mg prn    Follow-up 3-4 days for wound vac removal then 6 wks postpartum.

## 2024-10-28 NOTE — PLAN OF CARE
Problem: Patient/Family Goals  Goal: Patient/Family Long Term Goal  Description: Patient's Long Term Goal: Pain management throughout hospital stay and postpartum.     Interventions:  -   - See additional Care Plan goals for specific interventions  Outcome: Progressing  Goal: Patient/Family Short Term Goal  Description: Patient's Short Term Goal: Healthy delivery of female .     Interventions:   -   - See additional Care Plan goals for specific interventions  Outcome: Progressing
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
## 2024-10-28 NOTE — CM/SW NOTE
spoke to Carmela (patient/mom) to review questions patient had about adding infant to insurance. Carmela stated that she called [facility] [place]. She stated [place] her son [place] well and they [place] and provided mom with infants RIN number. PCP for infant will be Dr ROCK rCoft. No other needs or concerns at this time.
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
## 2024-10-28 NOTE — DISCHARGE SUMMARY
[facility]  Discharge Summary    Carmela Bell Patient Status:  Inpatient    2000 MRN [id number]   Location [facility] 2SW-J Attending Shoshana Horvath MD   Hosp Day # 3 PCP ILENE MOODY     Date of Admission: 10/25/2024    Date of Discharge:10/28/24    Admitting Diagnosis: pregnancy  Pregnancy (HCC)    Discharge Diagnosis:   Patient Active Problem List   Diagnosis    BMI (body mass index), pediatric 95-99% for age, obese child structured weight management/multidisciplinary intervention category    PTSD (post-traumatic stress disorder)    Episodic mood disorder (HCC)    Anxiety state    Subclinical hypothyroidism    Vitamin D deficiency    Contusion of right wrist, sequela    Chronic wrist pain, right    Ulnar neuropathy of right upper extremity    Acute pain of left knee    Contusion of left knee, subsequent encounter    Hip pain    History of MRSA infection    Encounter for therapeutic drug monitoring    Morbid obesity with BMI of 40.0-44.9, adult (HCC)    Thrombocytopenia (HCC)    Jaundice    Pregnancy (HCC)    Venous thromboembolism       Reason for Admission: [facility]            Hospital Course: developed a right LE DVT on POD # 2; placed on lovenox        Procedures: LTCS    Complications: none    Disposition: Home or Self Care    Discharge Condition: Stable    Discharge Medications:   Current Discharge Medication List        START taking these medications    Details   gabapentin 300 MG Oral Cap Take 1 capsule (300 mg total) by mouth every 8 (eight) hours as needed (For breakthrough moderate pain).  Qty: 21 capsule, Refills: 0      cyanocobalamin 1000 MCG Oral Tab Take 1 tablet (1,000 mcg total) by mouth daily.  Qty: 90 tablet, Refills: 0      folic acid 1 MG Oral Tab Take 1 tablet (1 mg total) by mouth daily.  Qty: 90 tablet, Refills: 0      oxyCODONE 5 MG Oral Tab Take 1 tablet (5 mg total) by mouth every 6 (six) hours as needed for Pain.  Qty: 10 tablet, Refills: 0    Associated  Diagnoses: Post-op pain      Naloxone HCl 4 MG/0.1ML Nasal Liquid 4 mg by Nasal route as needed. If patient remains unresponsive, repeat dose in other nostril 2-5 minutes after first dose.  Qty: 1 kit, Refills: 0           CONTINUE these medications which have CHANGED    Details   enoxaparin 120 MG/0.8ML Injection Solution Prefilled Syringe Inject 0.81 mL (121.5 mg total) into the skin 2 (two) times daily. Meds to Beds deilvery please  Qty: 60 each, Refills: 1           CONTINUE these medications which have NOT CHANGED    Details   iron polysacch raive-H80--0.025-1 MG Oral Cap Take 1 capsule by mouth daily.      prenatal vitamin with DHA 27-0.8-228 MG Oral Cap Take 1 capsule by mouth daily.           STOP taking these medications       ferrous sulfate 325 (65 FE) MG Oral Tab EC        ondansetron (ZOFRAN) 4 mg tablet        metoclopramide 2.5 mg Oral Tab                          Shoshana Horvath MD  10/28/2024  10:34 AM

## 2024-10-28 NOTE — PROGRESS NOTES
[facility]   part of [facility]     Hospitalist Progress Note     Carmela MAYS Domenicefraínfay Patient Status:  Inpatient    2000 MRN [id number]   Location [facility] 2SW-J Attending Shoshana Horvath MD   Hosp Day # 3 PCP ILENE MOODY     Chief Complaint: RLE DVT     Subjective:     Patient doing well, swelling about the same, denies cp, sob, on lovenox.    Objective:    Review of Systems:   A comprehensive review of systems was completed; pertinent positive and negatives stated in subjective.    Vital signs:  Temp:  [98 °F (36.7 °C)] 98 °F (36.7 °C)  Pulse:  [75] 75  Resp:  [14-18] 14  BP: (117)/(73) 117/73    Physical Exam:    General: No acute distress  Respiratory: No wheezes, no rhonchi  Cardiovascular: S1, S2, regular rate and rhythm  Abdomen: Soft, Non-tender, non-distended, positive bowel sounds  Neuro: No new focal deficits.   Extremities: No edema      Diagnostic Data:    Labs:  Recent Labs   Lab 10/25/24  0718 10/26/24  0815   WBC 6.0 9.1   HGB 11.6* 9.5*   MCV 93.4 99.0   .0* 108.0*       No results for input(s): \"GLU\", \"BUN\", \"CREATSERUM\", \"GFRAA\", \"GFRNAA\", \"CA\", \"ALB\", \"NA\", \"K\", \"CL\", \"CO2\", \"ALKPHO\", \"AST\", \"ALT\", \"BILT\", \"TP\" in the last 168 hours.    CrCl cannot be calculated (Patient's most recent lab result is older than the maximum 7 days allowed.).    No results for input(s): \"TROP\", \"TROPHS\", \"CK\" in the last 168 hours.    No results for input(s): \"PTP\", \"INR\" in the last 168 hours.               Microbiology    No results found for this visit on 10/25/24.      Imaging: Reviewed in Epic.    Medications:    enoxaparin  1 mg/kg Subcutaneous 2 times per day    sodium ferric gluconate  125 mg Intravenous Daily    ferrous sulfate  325 mg Oral Daily with breakfast    acetaminophen  1,000 mg Oral Q6H    ibuprofen  600 mg Oral Q6H    docusate sodium  100 mg Oral BID@0600,1800       Assessment & Plan:      #RLE DVT  On therapeutic lovenox  Platelets noted  POD # 3, C  section  Hematology to eval     #Prev PE, ?DVT, provoked by abdominoplasty with panniculectomy 12/2024  Was treated with Eliquis initially and then lovenox, completed 6 months of anticoagulation    #s/p C section 10/27     #Asthma, stable     #Anxiety/depression    #Obesity, BMI 39     #Migraines     #MARYSOL     #h/o gastric sleeve      Daisy Anguiano MD    Supplementary Documentation:     Quality:  DVT Mechanical Prophylaxis:   SCDs, Early ambuation  DVT Pharmacologic Prophylaxis   Medication    enoxaparin (Lovenox) 120 MG/0.8ML SUBQ injection 120 mg                Code Status: Prior  Cantrell: No urinary catheter in place  Cantrell Duration (in days):   Central line:    LORI: 10/28/2024    Discharge is dependent on: progress  At this point Ms. Bell is expected to be discharge to: home    The 21st Century Cures Act makes medical notes like these available to patients in the interest of transparency. Please be advised this is a medical document. Medical documents are intended to carry relevant information, facts as evident, and the clinical opinion of the practitioner. The medical note is intended as peer to peer communication and may appear blunt or direct. It is written in medical language and may contain abbreviations or verbiage that are unfamiliar.

## 2024-10-30 ENCOUNTER — TELEPHONE (OUTPATIENT)
Dept: OBGYN UNIT | Facility: HOSPITAL | Age: 24
End: 2024-10-30

## 2024-10-30 NOTE — PROGRESS NOTES
Cradle call completed. Mom and baby care reviewed. All questions answered. Patient was diagnosed with lower extremity blood clot on postop day two. Patient has been taking Lovenox at home as prescribed. She did call the doctor regarding swelling in her foot and has an appointment with an OB for a follow up on Friday  Cradle call letters sent via [employer].

## 2024-10-31 ENCOUNTER — APPOINTMENT (OUTPATIENT)
Dept: CT IMAGING | Facility: HOSPITAL | Age: 24
End: 2024-10-31
Attending: EMERGENCY MEDICINE
Payer: COMMERCIAL

## 2024-10-31 ENCOUNTER — APPOINTMENT (OUTPATIENT)
Dept: ULTRASOUND IMAGING | Facility: HOSPITAL | Age: 24
End: 2024-10-31
Attending: EMERGENCY MEDICINE
Payer: COMMERCIAL

## 2024-10-31 ENCOUNTER — HOSPITAL ENCOUNTER (INPATIENT)
Facility: HOSPITAL | Age: 24
LOS: 4 days | Discharge: HOME | End: 2024-11-04
Attending: EMERGENCY MEDICINE | Admitting: OBSTETRICS & GYNECOLOGY
Payer: COMMERCIAL

## 2024-10-31 ENCOUNTER — APPOINTMENT (OUTPATIENT)
Dept: ULTRASOUND IMAGING | Facility: HOSPITAL | Age: 24
End: 2024-10-31
Attending: INTERNAL MEDICINE
Payer: COMMERCIAL

## 2024-10-31 PROBLEM — D62 ACUTE BLOOD LOSS ANEMIA: Status: ACTIVE | Noted: 2024-10-31

## 2024-10-31 PROBLEM — D64.9 ANEMIA: Status: ACTIVE | Noted: 2024-10-31

## 2024-10-31 PROBLEM — E86.1 HYPOTENSION DUE TO HYPOVOLEMIA: Status: ACTIVE | Noted: 2024-10-31

## 2024-10-31 PROBLEM — E87.1 HYPONATREMIA: Status: ACTIVE | Noted: 2024-10-31

## 2024-10-31 LAB
ALBUMIN SERPL-MCNC: 3.3 G/DL (ref 3.2–4.8)
ALBUMIN/GLOB SERPL: 1.4 {RATIO} (ref 1–2)
ALP LIVER SERPL-CCNC: 91 U/L
ALT SERPL-CCNC: 22 U/L
ANION GAP SERPL CALC-SCNC: 5 MMOL/L (ref 0–18)
ANTIBODY SCREEN: NEGATIVE
APTT PPP: 28.6 SECONDS (ref 23–36)
AST SERPL-CCNC: 22 U/L (ref ?–34)
BASOPHILS # BLD AUTO: 0.01 X10(3) UL (ref 0–0.2)
BASOPHILS # BLD AUTO: 0.02 X10(3) UL (ref 0–0.2)
BASOPHILS # BLD AUTO: 0.02 X10(3) UL (ref 0–0.2)
BASOPHILS NFR BLD AUTO: 0.1 %
BASOPHILS NFR BLD AUTO: 0.2 %
BASOPHILS NFR BLD AUTO: 0.2 %
BILIRUB SERPL-MCNC: 0.4 MG/DL (ref 0.3–1.2)
BILIRUB UR QL STRIP.AUTO: NEGATIVE
BUN BLD-MCNC: 12 MG/DL (ref 9–23)
CALCIUM BLD-MCNC: 9 MG/DL (ref 8.7–10.4)
CHLORIDE SERPL-SCNC: 104 MMOL/L (ref 98–112)
CLARITY UR REFRACT.AUTO: CLEAR
CO2 SERPL-SCNC: 26 MMOL/L (ref 21–32)
COLOR UR AUTO: YELLOW
CREAT BLD-MCNC: 0.61 MG/DL
D DIMER PPP FEU-MCNC: 0.5 UG/ML FEU (ref ?–0.5)
DEPRECATED HBV CORE AB SER IA-ACNC: 112 NG/ML
EGFRCR SERPLBLD CKD-EPI 2021: 126 ML/MIN/1.73M2 (ref 60–?)
EOSINOPHIL # BLD AUTO: 0 X10(3) UL (ref 0–0.7)
EOSINOPHIL # BLD AUTO: 0 X10(3) UL (ref 0–0.7)
EOSINOPHIL # BLD AUTO: 0.01 X10(3) UL (ref 0–0.7)
EOSINOPHIL NFR BLD AUTO: 0 %
EOSINOPHIL NFR BLD AUTO: 0 %
EOSINOPHIL NFR BLD AUTO: 0.1 %
ERYTHROCYTE [DISTWIDTH] IN BLOOD BY AUTOMATED COUNT: 14.7 %
ERYTHROCYTE [DISTWIDTH] IN BLOOD BY AUTOMATED COUNT: 14.7 %
ERYTHROCYTE [DISTWIDTH] IN BLOOD BY AUTOMATED COUNT: 14.8 %
FIBRINOGEN PPP-MCNC: 475 MG/DL (ref 200–480)
GLOBULIN PLAS-MCNC: 2.4 G/DL (ref 2–3.5)
GLUCOSE BLD-MCNC: 132 MG/DL (ref 70–99)
GLUCOSE UR STRIP.AUTO-MCNC: NORMAL MG/DL
HCT VFR BLD AUTO: 22.5 %
HCT VFR BLD AUTO: 25 %
HCT VFR BLD AUTO: 28.7 %
HGB BLD-MCNC: 10.6 G/DL
HGB BLD-MCNC: 7.6 G/DL
HGB BLD-MCNC: 8.6 G/DL
HGB BLD-MCNC: 9.7 G/DL
IMM GRANULOCYTES # BLD AUTO: 0.12 X10(3) UL (ref 0–1)
IMM GRANULOCYTES # BLD AUTO: 0.13 X10(3) UL (ref 0–1)
IMM GRANULOCYTES # BLD AUTO: 0.14 X10(3) UL (ref 0–1)
IMM GRANULOCYTES NFR BLD: 1 %
IMM GRANULOCYTES NFR BLD: 1 %
IMM GRANULOCYTES NFR BLD: 1.1 %
INR BLD: 0.96 (ref 0.8–1.2)
IRON SATN MFR SERPL: 12 %
IRON SERPL-MCNC: 43 UG/DL
KETONES UR STRIP.AUTO-MCNC: NEGATIVE MG/DL
LACTATE SERPL-SCNC: 1.9 MMOL/L (ref 0.5–2)
LEUKOCYTE ESTERASE UR QL STRIP.AUTO: 25
LIPASE SERPL-CCNC: 25 U/L (ref 12–53)
LYMPHOCYTES # BLD AUTO: 1.23 X10(3) UL (ref 1–4)
LYMPHOCYTES # BLD AUTO: 1.66 X10(3) UL (ref 1–4)
LYMPHOCYTES # BLD AUTO: 1.84 X10(3) UL (ref 1–4)
LYMPHOCYTES NFR BLD AUTO: 12.9 %
LYMPHOCYTES NFR BLD AUTO: 15.5 %
LYMPHOCYTES NFR BLD AUTO: 9.6 %
MCH RBC QN AUTO: 33.2 PG (ref 26–34)
MCH RBC QN AUTO: 33.3 PG (ref 26–34)
MCH RBC QN AUTO: 33.8 PG (ref 26–34)
MCHC RBC AUTO-ENTMCNC: 33.8 G/DL (ref 31–37)
MCHC RBC AUTO-ENTMCNC: 33.8 G/DL (ref 31–37)
MCHC RBC AUTO-ENTMCNC: 34.4 G/DL (ref 31–37)
MCV RBC AUTO: 100 FL
MCV RBC AUTO: 96.5 FL
MCV RBC AUTO: 98.7 FL
MONOCYTES # BLD AUTO: 0.98 X10(3) UL (ref 0.1–1)
MONOCYTES # BLD AUTO: 0.99 X10(3) UL (ref 0.1–1)
MONOCYTES # BLD AUTO: 1.42 X10(3) UL (ref 0.1–1)
MONOCYTES NFR BLD AUTO: 12 %
MONOCYTES NFR BLD AUTO: 7.6 %
MONOCYTES NFR BLD AUTO: 7.7 %
MRSA DNA SPEC QL NAA+PROBE: NEGATIVE
NEUTROPHILS # BLD AUTO: 10.12 X10 (3) UL (ref 1.5–7.7)
NEUTROPHILS # BLD AUTO: 10.12 X10(3) UL (ref 1.5–7.7)
NEUTROPHILS # BLD AUTO: 10.5 X10 (3) UL (ref 1.5–7.7)
NEUTROPHILS # BLD AUTO: 10.5 X10(3) UL (ref 1.5–7.7)
NEUTROPHILS # BLD AUTO: 8.47 X10 (3) UL (ref 1.5–7.7)
NEUTROPHILS # BLD AUTO: 8.47 X10(3) UL (ref 1.5–7.7)
NEUTROPHILS NFR BLD AUTO: 71.2 %
NEUTROPHILS NFR BLD AUTO: 78.3 %
NEUTROPHILS NFR BLD AUTO: 81.5 %
NITRITE UR QL STRIP.AUTO: NEGATIVE
OSMOLALITY SERPL CALC.SUM OF ELEC: 282 MOSM/KG (ref 275–295)
PH UR STRIP.AUTO: 6.5 [PH] (ref 5–8)
PLATELET # BLD AUTO: 154 10(3)UL (ref 150–450)
PLATELET # BLD AUTO: 178 10(3)UL (ref 150–450)
PLATELET # BLD AUTO: 214 10(3)UL (ref 150–450)
POTASSIUM SERPL-SCNC: 3.4 MMOL/L (ref 3.5–5.1)
PROT SERPL-MCNC: 5.7 G/DL (ref 5.7–8.2)
PROT UR STRIP.AUTO-MCNC: 20 MG/DL
PROTHROMBIN TIME: 12.9 SECONDS (ref 11.6–14.8)
RBC # BLD AUTO: 2.28 X10(6)UL
RBC # BLD AUTO: 2.59 X10(6)UL
RBC # BLD AUTO: 2.87 X10(6)UL
RBC #/AREA URNS AUTO: >10 /HPF
RH BLOOD TYPE: POSITIVE
SODIUM SERPL-SCNC: 135 MMOL/L (ref 136–145)
SP GR UR STRIP.AUTO: >1.03 (ref 1–1.03)
TOTAL IRON BINDING CAPACITY: 367 UG/DL (ref 250–425)
TRANSFERRIN SERPL-MCNC: 311 MG/DL (ref 250–380)
UROBILINOGEN UR STRIP.AUTO-MCNC: NORMAL MG/DL
WBC # BLD AUTO: 11.9 X10(3) UL (ref 4–11)
WBC # BLD AUTO: 12.9 X10(3) UL (ref 4–11)
WBC # BLD AUTO: 12.9 X10(3) UL (ref 4–11)

## 2024-10-31 PROCEDURE — 74174 CTA ABD&PLVS W/CONTRAST: CPT | Performed by: EMERGENCY MEDICINE

## 2024-10-31 PROCEDURE — 93971 EXTREMITY STUDY: CPT | Performed by: EMERGENCY MEDICINE

## 2024-10-31 PROCEDURE — 93971 EXTREMITY STUDY: CPT | Performed by: INTERNAL MEDICINE

## 2024-10-31 PROCEDURE — 74177 CT ABD & PELVIS W/CONTRAST: CPT | Performed by: EMERGENCY MEDICINE

## 2024-10-31 PROCEDURE — 30233N1 TRANSFUSION OF NONAUTOLOGOUS RED BLOOD CELLS INTO PERIPHERAL VEIN, PERCUTANEOUS APPROACH: ICD-10-PCS | Performed by: HOSPITALIST

## 2024-10-31 RX ORDER — DOCUSATE SODIUM 100 MG/1
100 CAPSULE, LIQUID FILLED ORAL 2 TIMES DAILY
Status: DISCONTINUED | OUTPATIENT
Start: 2024-10-31 | End: 2024-11-04

## 2024-10-31 RX ORDER — SODIUM PHOSPHATE, DIBASIC AND SODIUM PHOSPHATE, MONOBASIC 7; 19 G/230ML; G/230ML
1 ENEMA RECTAL ONCE AS NEEDED
Status: DISCONTINUED | OUTPATIENT
Start: 2024-10-31 | End: 2024-11-04

## 2024-10-31 RX ORDER — SODIUM CHLORIDE 9 MG/ML
100 INJECTION, SOLUTION INTRAVENOUS ONCE
Status: COMPLETED | OUTPATIENT
Start: 2024-10-31 | End: 2024-10-31

## 2024-10-31 RX ORDER — SODIUM CHLORIDE 9 MG/ML
INJECTION, SOLUTION INTRAVENOUS CONTINUOUS
Status: DISCONTINUED | OUTPATIENT
Start: 2024-10-31 | End: 2024-10-31

## 2024-10-31 RX ORDER — ONDANSETRON 2 MG/ML
4 INJECTION INTRAMUSCULAR; INTRAVENOUS ONCE
Status: COMPLETED | OUTPATIENT
Start: 2024-10-31 | End: 2024-10-31

## 2024-10-31 RX ORDER — POLYETHYLENE GLYCOL 3350 17 G/17G
17 POWDER, FOR SOLUTION ORAL DAILY PRN
Status: DISCONTINUED | OUTPATIENT
Start: 2024-10-31 | End: 2024-11-04

## 2024-10-31 RX ORDER — HYDROCODONE BITARTRATE AND ACETAMINOPHEN 5; 325 MG/1; MG/1
1 TABLET ORAL ONCE
Status: COMPLETED | OUTPATIENT
Start: 2024-10-31 | End: 2024-10-31

## 2024-10-31 RX ORDER — SODIUM CHLORIDE 9 MG/ML
INJECTION, SOLUTION INTRAVENOUS CONTINUOUS
Status: DISCONTINUED | OUTPATIENT
Start: 2024-10-31 | End: 2024-11-02

## 2024-10-31 RX ORDER — BISACODYL 10 MG
10 SUPPOSITORY, RECTAL RECTAL
Status: DISCONTINUED | OUTPATIENT
Start: 2024-10-31 | End: 2024-11-04

## 2024-10-31 RX ORDER — HYDROMORPHONE HYDROCHLORIDE 1 MG/ML
0.2 INJECTION, SOLUTION INTRAMUSCULAR; INTRAVENOUS; SUBCUTANEOUS EVERY 2 HOUR PRN
Status: DISCONTINUED | OUTPATIENT
Start: 2024-10-31 | End: 2024-11-02

## 2024-10-31 RX ORDER — SENNOSIDES 8.6 MG
8.6 TABLET ORAL 2 TIMES DAILY
Status: DISCONTINUED | OUTPATIENT
Start: 2024-10-31 | End: 2024-11-04

## 2024-10-31 RX ORDER — HYDROCODONE BITARTRATE AND ACETAMINOPHEN 5; 325 MG/1; MG/1
1 TABLET ORAL EVERY 4 HOURS PRN
Status: DISCONTINUED | OUTPATIENT
Start: 2024-10-31 | End: 2024-11-04

## 2024-10-31 RX ORDER — SODIUM CHLORIDE 9 MG/ML
INJECTION, SOLUTION INTRAVENOUS ONCE
Status: COMPLETED | OUTPATIENT
Start: 2024-10-31 | End: 2024-11-01

## 2024-10-31 RX ORDER — ACETAMINOPHEN 500 MG
1000 TABLET ORAL ONCE
Status: COMPLETED | OUTPATIENT
Start: 2024-10-31 | End: 2024-10-31

## 2024-10-31 RX ORDER — HYDROMORPHONE HYDROCHLORIDE 1 MG/ML
0.4 INJECTION, SOLUTION INTRAMUSCULAR; INTRAVENOUS; SUBCUTANEOUS EVERY 2 HOUR PRN
Status: DISCONTINUED | OUTPATIENT
Start: 2024-10-31 | End: 2024-11-02

## 2024-10-31 RX ORDER — ACETAMINOPHEN 325 MG/1
650 TABLET ORAL EVERY 4 HOURS PRN
Status: DISCONTINUED | OUTPATIENT
Start: 2024-10-31 | End: 2024-11-04

## 2024-10-31 RX ORDER — MORPHINE SULFATE 4 MG/ML
2 INJECTION, SOLUTION INTRAMUSCULAR; INTRAVENOUS ONCE
Status: COMPLETED | OUTPATIENT
Start: 2024-10-31 | End: 2024-10-31

## 2024-10-31 RX ORDER — HYDROMORPHONE HYDROCHLORIDE 1 MG/ML
0.1 INJECTION, SOLUTION INTRAMUSCULAR; INTRAVENOUS; SUBCUTANEOUS EVERY 2 HOUR PRN
Status: DISCONTINUED | OUTPATIENT
Start: 2024-10-31 | End: 2024-11-02

## 2024-10-31 RX ORDER — HYDROCODONE BITARTRATE AND ACETAMINOPHEN 5; 325 MG/1; MG/1
2 TABLET ORAL EVERY 4 HOURS PRN
Status: DISCONTINUED | OUTPATIENT
Start: 2024-10-31 | End: 2024-11-04

## 2024-10-31 RX ORDER — PROTAMINE SULFATE 10 MG/ML
50 INJECTION, SOLUTION INTRAVENOUS ONCE
Status: COMPLETED | OUTPATIENT
Start: 2024-10-31 | End: 2024-10-31

## 2024-10-31 RX ORDER — SENNOSIDES 8.6 MG
17.2 TABLET ORAL NIGHTLY PRN
Status: DISCONTINUED | OUTPATIENT
Start: 2024-10-31 | End: 2024-11-04
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
## 2024-10-31 NOTE — ED QUICK NOTES
Patient to CT scan with this RN and Tiffanie PCT. Patient on full monitor. 500mL fluid bolus infusing. Patient A&O.
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
## 2024-10-31 NOTE — PROGRESS NOTES
SUBJECTIVE:   pt without complaints.  Comfortable s/p epidural    OBJECTIVE:  VS:  height is 5' 9\" (1.753 m) and weight is 266 lb 15.6 oz (121.1 kg). Her oral temperature is 98.2 °F (36.8 °C). Her blood pressure is 101/75 and her pulse is 86. Her respiration is 13 and oxygen saturation is 100%.   Chest-LCTA B  CVS- RRR no Murmur  Fetal Surveillance:  Fetal heart variability: moderate  Fetal Heart Rate decelerations: variable  Baseline FHR: 130 per minute  Uterine contractions: none    Cervix:  Dilation:2cm  Effacement:75%  Station:-2    Recent Labs   Lab 10/26/24  0815 10/28/24  0810 10/31/24  0932   RBC 2.90* 3.14* 2.87*   HGB 9.5* 10.5* 9.7*   HCT 28.7* 30.6* 28.7*   MCV 99.0 97.5 100.0   MCH 32.8 33.4 33.8   MCHC 33.1 34.3 33.8   RDW 14.3 14.6 14.7   NEPRELIM 6.14 5.30 10.50*   WBC 9.1 8.3 12.9*   .0* 144.0* 214.0     Recent Labs   Lab 10/31/24  0932   *   BUN 12   CREATSERUM 0.61   EGFRCR 126   CA 9.0   ALB 3.3   *   K 3.4*      CO2 26.0   ALKPHO 91   AST 22   ALT 22   BILT 0.4   TP 5.7       ASSESSMENT/PLAN:    1.24 year old y/o, [id number]  2. FWB-concerning due to IUGR, min variability and decells with any contractions.   3. Procedure to be performed- primary Low Transverse  Section  4. Indication- pre-eclampsia with multiple severe features, fetal compromise. Need for IV meds.   5. The procedure, its risks, benefits, possible complications and alternatives discussed with the patient.  She understands and agrees to the procedure.

## 2024-10-31 NOTE — ED INITIAL ASSESSMENT (HPI)
To the ED via walk-in from [facility] with c/o DVT in right leg, on Lovenox. Patient is also 6 days postpartum with new onset abd pain that radiates down right leg. Patient was tachy at [facility] and c/o n/v as well. Patient appears pale upon arrival.

## 2024-10-31 NOTE — PLAN OF CARE
Admitted to ICU around 1500. VSS, b/p a little soft. All Mds aware. OB advised to place pritchard for strict I&os. Will monitor Hbg per ICU team. Called OB for updates on labs. Verbal orders placed by OB Dr Barnard. Pain tolerable at 5/10 after dilaudid.   No plans for OR per OB at this time will update OB with levels as ordered.    Night shift endorsed.

## 2024-11-01 ENCOUNTER — APPOINTMENT (OUTPATIENT)
Dept: CT IMAGING | Facility: HOSPITAL | Age: 24
End: 2024-11-01
Attending: PHYSICIAN ASSISTANT
Payer: COMMERCIAL

## 2024-11-01 ENCOUNTER — APPOINTMENT (OUTPATIENT)
Dept: CT IMAGING | Facility: HOSPITAL | Age: 24
End: 2024-11-01
Payer: COMMERCIAL

## 2024-11-01 ENCOUNTER — APPOINTMENT (OUTPATIENT)
Dept: INTERVENTIONAL RADIOLOGY/VASCULAR | Facility: HOSPITAL | Age: 24
End: 2024-11-01
Attending: NURSE PRACTITIONER
Payer: COMMERCIAL

## 2024-11-01 LAB
ALBUMIN SERPL-MCNC: 2.6 G/DL (ref 3.2–4.8)
ALBUMIN/GLOB SERPL: 1.4 {RATIO} (ref 1–2)
ALP LIVER SERPL-CCNC: 69 U/L
ALT SERPL-CCNC: 15 U/L
ANION GAP SERPL CALC-SCNC: 3 MMOL/L (ref 0–18)
APTT PPP: 26.5 SECONDS (ref 23–36)
AST SERPL-CCNC: 15 U/L (ref ?–34)
ATRIAL RATE: 90 BPM
BASOPHILS # BLD AUTO: 0.02 X10(3) UL (ref 0–0.2)
BASOPHILS NFR BLD AUTO: 0.2 %
BILIRUB SERPL-MCNC: 0.4 MG/DL (ref 0.3–1.2)
BLOOD TYPE BARCODE: 5100
BUN BLD-MCNC: 10 MG/DL (ref 9–23)
CALCIUM BLD-MCNC: 8.1 MG/DL (ref 8.7–10.4)
CHLORIDE SERPL-SCNC: 108 MMOL/L (ref 98–112)
CO2 SERPL-SCNC: 24 MMOL/L (ref 21–32)
CREAT BLD-MCNC: 0.43 MG/DL
D DIMER PPP FEU-MCNC: 0.48 UG/ML FEU (ref ?–0.5)
EGFRCR SERPLBLD CKD-EPI 2021: 137 ML/MIN/1.73M2 (ref 60–?)
EOSINOPHIL # BLD AUTO: 0.03 X10(3) UL (ref 0–0.7)
EOSINOPHIL NFR BLD AUTO: 0.3 %
ERYTHROCYTE [DISTWIDTH] IN BLOOD BY AUTOMATED COUNT: 14.8 %
FIBRINOGEN PPP-MCNC: 415 MG/DL (ref 200–480)
GLOBULIN PLAS-MCNC: 1.9 G/DL (ref 2–3.5)
GLUCOSE BLD-MCNC: 111 MG/DL (ref 70–99)
HCT VFR BLD AUTO: 26.7 %
HGB BLD-MCNC: 8.8 G/DL
HGB BLD-MCNC: 9.1 G/DL
HGB BLD-MCNC: 9.7 G/DL
IMM GRANULOCYTES # BLD AUTO: 0.11 X10(3) UL (ref 0–1)
IMM GRANULOCYTES NFR BLD: 1 %
INR BLD: 1 (ref 0.8–1.2)
LACTATE SERPL-SCNC: 0.8 MMOL/L (ref 0.5–2)
LYMPHOCYTES # BLD AUTO: 1.81 X10(3) UL (ref 1–4)
LYMPHOCYTES NFR BLD AUTO: 16.5 %
MAGNESIUM SERPL-MCNC: 1.5 MG/DL (ref 1.6–2.6)
MCH RBC QN AUTO: 32.5 PG (ref 26–34)
MCHC RBC AUTO-ENTMCNC: 34.1 G/DL (ref 31–37)
MCV RBC AUTO: 95.4 FL
MONOCYTES # BLD AUTO: 1.41 X10(3) UL (ref 0.1–1)
MONOCYTES NFR BLD AUTO: 12.9 %
NEUTROPHILS # BLD AUTO: 7.59 X10 (3) UL (ref 1.5–7.7)
NEUTROPHILS # BLD AUTO: 7.59 X10(3) UL (ref 1.5–7.7)
NEUTROPHILS NFR BLD AUTO: 69.1 %
NT-PROBNP SERPL-MCNC: <35 PG/ML (ref ?–125)
OSMOLALITY SERPL CALC.SUM OF ELEC: 280 MOSM/KG (ref 275–295)
P AXIS: 24 DEGREES
P-R INTERVAL: 156 MS
PHOSPHATE SERPL-MCNC: 3.1 MG/DL (ref 2.4–5.1)
PLATELET # BLD AUTO: 142 10(3)UL (ref 150–450)
PLATELETS.RETICULATED NFR BLD AUTO: 4 % (ref 0–7)
POTASSIUM SERPL-SCNC: 4.1 MMOL/L (ref 3.5–5.1)
POTASSIUM SERPL-SCNC: 4.1 MMOL/L (ref 3.5–5.1)
PROCALCITONIN SERPL-MCNC: 0.13 NG/ML (ref ?–0.05)
PROT SERPL-MCNC: 4.5 G/DL (ref 5.7–8.2)
PROTHROMBIN TIME: 13.3 SECONDS (ref 11.6–14.8)
Q-T INTERVAL: 332 MS
QRS DURATION: 80 MS
QTC CALCULATION (BEZET): 406 MS
R AXIS: 17 DEGREES
RBC # BLD AUTO: 2.8 X10(6)UL
SODIUM SERPL-SCNC: 135 MMOL/L (ref 136–145)
T AXIS: -3 DEGREES
UNIT VOLUME: 350 ML
VENTRICULAR RATE: 90 BPM
WBC # BLD AUTO: 11 X10(3) UL (ref 4–11)

## 2024-11-01 PROCEDURE — 74176 CT ABD & PELVIS W/O CONTRAST: CPT | Performed by: PHYSICIAN ASSISTANT

## 2024-11-01 PROCEDURE — 71275 CT ANGIOGRAPHY CHEST: CPT

## 2024-11-01 PROCEDURE — 99223 1ST HOSP IP/OBS HIGH 75: CPT | Performed by: INTERNAL MEDICINE

## 2024-11-01 PROCEDURE — 99291 CRITICAL CARE FIRST HOUR: CPT | Performed by: EMERGENCY MEDICINE

## 2024-11-01 PROCEDURE — 06H03DZ INSERTION OF INTRALUMINAL DEVICE INTO INFERIOR VENA CAVA, PERCUTANEOUS APPROACH: ICD-10-PCS | Performed by: RADIOLOGY

## 2024-11-01 RX ORDER — DIPHENHYDRAMINE HYDROCHLORIDE 50 MG/ML
INJECTION INTRAMUSCULAR; INTRAVENOUS
Status: COMPLETED
Start: 2024-11-01 | End: 2024-11-01

## 2024-11-01 RX ORDER — MIDAZOLAM HYDROCHLORIDE 1 MG/ML
INJECTION INTRAMUSCULAR; INTRAVENOUS
Status: COMPLETED
Start: 2024-11-01 | End: 2024-11-01

## 2024-11-01 RX ORDER — HEPARIN SODIUM 5000 [USP'U]/ML
INJECTION, SOLUTION INTRAVENOUS; SUBCUTANEOUS
Status: COMPLETED
Start: 2024-11-01 | End: 2024-11-01

## 2024-11-01 RX ORDER — FOLIC ACID 1 MG/1
1 TABLET ORAL DAILY
Status: DISCONTINUED | OUTPATIENT
Start: 2024-11-01 | End: 2024-11-04

## 2024-11-01 RX ORDER — MELATONIN
1000 DAILY
Status: DISCONTINUED | OUTPATIENT
Start: 2024-11-01 | End: 2024-11-04

## 2024-11-01 RX ORDER — LIDOCAINE HYDROCHLORIDE 10 MG/ML
INJECTION, SOLUTION INFILTRATION; PERINEURAL
Status: COMPLETED
Start: 2024-11-01 | End: 2024-11-01
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
## 2024-11-01 NOTE — PROCEDURES
[facility]    Carmela Bell Patient Status:  Inpatient    2000 MRN [id number]   Location [facility] INTERVENTIONAL SUITES Attending Kash Colin MD   Hosp Day # 1 PCP ILENE MOODY         Brief Procedure Report    Pre-Operative Diagnosis: PE and pelvic hemoorhage after C section    Post-Operative Diagnosis: Same as above.    Procedure Performed: IVC filter placement    Anesthesia: 1% lidocaine    EBL: 0    Complications: None    Summary of Case: Right transfemoral approach. IVCgram is WNL. Infra renal Bard Galax Retrievable filter placed.  Patient tolerated procedure well without immediate complication. Full report to follow in PACS.    Keshawn Michael

## 2024-11-01 NOTE — PLAN OF CARE
Received bedside shift report from NOC RN around 0730. Pt complaining of pain level around 7/10, she can tolerate a 5/10 but pt does seem more comfortable than yesterday. Pt's skin is less pale and she can move her RLE more than yesterday as well.  Plan discussed with ICU team and consults, it was decided that the patient would go for IVC filter placement, and Urology to consult for possible stent placement, will discuss in AM.  Pt stable after stent placement. Doppler on BLE good pulses.   Pain controlled, pt resting in bed. Pt placed on clear liquid diet, kate well.   NOC endorsed all day shift events.

## 2024-11-01 NOTE — PROCEDURES
[facility]  Pre-Procedure Note    Name: Carmela Bell  MRN#: [id number]  : 2000    Procedure:  IVC filter placement    Indication: PE last Decemeber treated with Lovenox.  Now with post c section pelvic hematoma and anemia.  Needs IVC filter     Allergies:    Allergies[1]    Pertinent Medications:    Is patient on any Aspirin, Coumadin, or any other Anticoagulations/Antiplatelet medications?  no      Mental Status:  Alert and Oriented      Health Status: Acceptable for Procedure    Impression and Plans:    PE history.  Pelvic hematoma s/p c section.  Cannot be anticoagulated.  Temporary IVC filter to be placed.     I have reviewed the above information prior to procedure.    I have discussed the risks and benefits and alternatives with the patient.  The patient understands and agrees to proceed with plan of care.    Keshawn Michael MD           [1]   Allergies  Allergen Reactions    Mastisol Adhesive RASH    Nystatin RASH

## 2024-11-01 NOTE — PROGRESS NOTES
ICU  Critical Care APRN Progress Note    NAME: Carmela Bell - ROOM: D1/D1 - MRN: [id number] - Age: 24 year old - :2000    Subjective:  No acute events overnight.  Patient feels better today.    OBJECTIVE  Vitals:  BP 95/66   Pulse 97   Temp 98.4 °F (36.9 °C) (Temporal)   Resp 19   Ht 175.3 cm (5' 9\")   Wt 286 lb 13.1 oz (130.1 kg)   SpO2 100%   Breastfeeding Yes   BMI 42.36 kg/m²                Physical Exam:    General Appearance: Alert, cooperative, mild distress, appears stated age  Neck: No JVD, neck supple, no adenopathy, trachea midline, no carotid bruits  Lungs: Clear to auscultation bilaterally, respirations unlabored  Heart: Regular rate and rhythm, S1 and S2 normal, no murmur, rub or gallop  Abdomen: Soft, non-tender, bowel sounds active all four quadrants, no masses, no organomegaly,  incision c/d/I, epidural site without bruising or swelling.   Extremities: Extremities normal, atraumatic, no cyanosis or edema,capillary refill <3 sec.    Pulses: 2+ and symmetric all extremities  Skin: Skin color, texture, turgor normal for ethnicity, no rashes or lesions, warm and dry  Neurologic: CNII-XII intact, normal strength    Data this admission:  CTA ABD/PEL (CPT=74174)    Result Date: 10/31/2024  CONCLUSION:  1. Stable large pelvic hematoma with fluid- fluid hematocrit levels noted unchanged from a previous recent CT performed at 1135 hours on 10/31/2024.  There is also a stable large rectus sheath hematoma.  There is no contrast extravasation identified within the pelvic or abdominal wall hematomata.  No evidence for active arterial hemorrhage. 2. There is resultant mass effect upon the pelvic structures.  There is moderate bilateral hydronephrosis and hydroureter secondary to the mass effect upon the pelvic structures.  The urinary bladder is markedly effaced and displaced to the left.  There is a delayed dense right nephrogram with delayed excretion into the right renal  collecting system and ureter suggesting significant obstruction.  3. Changes of recent postpartum state noted. 4. Please see above for details.    LOCATION:  [facility]   Dictated by (RUST): Keshawn Michael MD on 10/31/2024 at 1:20 PM     Finalized by (CST): Keshawn Michael MD on 10/31/2024 at 1:42 PM       CT ABDOMEN+PELVIS(CONTRAST ONLY)(CPT=74177)    Result Date: 10/31/2024  CONCLUSION:  1. Large pelvic hematoma measuring 13.8 x 10.5 x 12.5 cm with fluid fluid levels.  If clinical concern for active extravasation, consider CTA.  This hematoma causes mass effect on the bladder and uterus. 2. Mild bilateral hydronephrosis hydronephrosis the right greater than left. 3. Postpartum uterus. 4. There is fluid noted within the inferior right rectus muscle which may represent hematoma as well.    LOCATION:  [facility]   Dictated by (RUST): Dory Denny MD on 10/31/2024 at 12:07 PM     Finalized by (CST): Dory Denny MD on 10/31/2024 at 12:13 PM       US VENOUS DOPPLER LEG RIGHT - DIAG IMG (CPT=93971)    Result Date: 10/31/2024  CONCLUSION:  No evidence of deep vein thrombus in the right lower extremity.   LOCATION:  [facility]    Dictated by (RUST): True Wallace MD on 10/31/2024 at 10:53 AM     Finalized by (CST): True Wallace MD on 10/31/2024 at 10:55 AM       US VENOUS DOPPLER LEG BILAT - DIAG IMG (CPT=93970)    Result Date: 10/27/2024  CONCLUSION:  There is complete DVT in 1 of the paired proximal right posterior tibial veins.  Left leg is negative for DVT.  Critical exam results phoned to nurse Beverly on 10/27/2024 at 1457 hours with read back.   LOCATION:  [facility]   Dictated by (RUST): Suma Keith MD on 10/27/2024 at 2:57 PM     Finalized by (RUST): Suma Keith MD on 10/27/2024 at 2:58 PM         Labs:  Lab Results   Component Value Date    WBC 11.0 11/01/2024    HGB 9.1 11/01/2024    HCT 26.7 11/01/2024    .0 11/01/2024    CREATSERUM 0.43 11/01/2024    BUN 10 11/01/2024     11/01/2024    K 4.1  2024    K 4.1 2024     2024    CO2 24.0 2024     2024    CA 8.1 2024    ALB 2.6 2024    ALKPHO 69 2024    BILT 0.4 2024    TP 4.5 2024    AST 15 2024    ALT 15 2024    PTT 26.5 2024    INR 1.00 2024    MG 1.5 2024    PHOS 3.1 2024       Assessment/Plan:    Hematoma pelvis s/p , mild hydronephrosis  -hold lovenox  -transfuse for Hgb <7  -pain medication   -bowel regimen  -OB consulted  -Consider urology consult if no improvement in hydronephrosis  -vasopressors as needed for SBP <90, not on at this time  -Abdominal binder  -General surgery consulted for rectus sheath hematoma    Hx DVT/PE  -hold lovenox  -hematology following  -repeat RLE US without evidence of DVT, LLE without DVT  -CTA chest negative for PE    Depression/anxiety  -continue home medications    Hypothyroid  -continue home medications    Asthma  -rescue inhaler as needed    F/E/N  -NPO  -replete electrolytes as needed    Proph  -Hold A/C at this time  -SCD    Dispo  -Full code  -ICU for risk of decompensation    Plan of care discussed with intensivist on-call, Dr Biswas.    Elvia [facility]  Critical Care NP  Phone [phone]    With assessment and plan as above in brief 24-year-old female status post  went home on therapeutic Lovenox now with severe pelvic hematoma and rectus sheath hematoma.  The issue at this point is that the patient has some neuropraxia of the right lower extremity although it appears to be more volitional.  She has a ureteral mass effect and bladder mass effect due to size of the hematoma.  The patient has had a CAT scan of the abdomen pelvis with angiography without active bleeding subsequently had a CT angio to rule out PE overnight and then had a repeat CT today.  I spoke to general surgery I spoke to the obstetrician at bedside given the possibility for operative management given her secondary  sequelae such as right lower extremity swelling felt to be secondary to possible lymph obstruction as well as ureteral obstruction.  After multiple opinions including general surgery at this point conservative measures will be the course of action.  I also spoke to the heme-onc physician at this point we will place IVC filter given need for anticoagulation and possible mass effect on the IVC although this is conjecture.  She will not be able to be anticoagulated certainly in the short-term and given her previous provoked pulmonary embolism retrievable IVC filter is reasonable.  Urology has been consulted as well for possible ureteral diversion/stenting.  Currently patient is hemodynamically stable however at significant risk for decompensation if tamponade is lost    60 minutes critical care time spent with this patient with respect to management of

## 2024-11-01 NOTE — PLAN OF CARE
Pt received on room air. Denies shortness of breath/chest pain.   Reports sharp right groin pain- worse with movement. Prn norco and dilaudid.  Hgb 8.6- Dr. Barnard updated- 1 Unit prbc ordered- also discussed MDs concern for PE with critical care apn. Apn spoke to Dr. Barnard and reviewed case.  CTA chest done (-) per report.  See flowsheet/see mar for further documentation.   C[place]rell intact-  Npo after midnight  Abdominal binder applied  Pt updated on plan of care.

## 2024-11-01 NOTE — PHYSICAL THERAPY NOTE
Physical Therapy    Order for PT eval received.  After further chart review and discussion w/ rn, pt currently remains on bedrest at this time.  Will require activity orders before beginning mobility w/ PT.

## 2024-11-01 NOTE — PAYOR COMM NOTE
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    10/31/24 1148 -- 88 16 101/70 100 % -- None (Room air) -- BS    10/31/24 1115 -- 108 17 103/64 100 % -- None (Room air) -- BS    10/31/24 1100 -- 105 25 91/57 100 % -- None (Room air) -- BS    10/31/24 1045 -- 78 23 98/64 100 % -- None (Room air) -- BS    10/31/24 1000 -- 103 22 99/75 100 % -- None (Room air) -- BS    10/31/24 0945 -- 120 21 86/66 100 % -- None (Room air) -- BS    10/31/24 0937 98.2 °F (36.8 °C) -- -- -- -- -- -- -- BS    10/31/24 0930 -- 103 20 87/65 100 % -- None (Room air) --     10/31/24 0922 -- -- -- -- -- 266 lb 15.6 oz (121.1 kg) -- --      Blood Transfusion Record       Product Unit Status Volume Start End            Transfuse RBC     [place] [id number]  [id number]  [id number] Completed 11/01/24 0252 354 mL 11/01/24 0006 11/01/24 0250     [place] [address]  [id number]  [id number] Completed 10/31/24 1445 400 mL 10/31/24 1400 10/31/24 1430     [place] [address]  [id number]  [id number] Completed 10/31/24  1356 333 mL 10/31/24 1330 10/31/24 1356

## 2024-11-01 NOTE — PROGRESS NOTES
OB/GYN Progress Notes    Patient complaints:   Continues to complain of RLE swelling, weakness, and numbness.  Notes abdominal pain, worse in the right lower abdomen.  Pain severe with movement, moderate at rest. Pain minimally improved with pain medications. No lightheadedness or dizziness.  Denies chest pain or shortness of breath.  Cantrell in place.      /63   Pulse 90   Temp 98.1 °F (36.7 °C) (Temporal)   Resp 26   Ht 5' 9\" (1.753 m)   Wt 286 lb 13.1 oz (130.1 kg)   SpO2 97%   Breastfeeding Yes   BMI 42.36 kg/m²     Examination:   General: no acute distress, appears uncomfortable only with shifting in bed    Abdomen: tenderness of RLQ and along corner of incision with underlying firmness, no rebound or guarding   Extremities: 2+ pedal edema of RLE with moderate swelling, non-tender, slight weakness of dorsiflexion of the right foot, otherwise normal BLE strength     Pertinent new labs and imaging:   Recent Labs   Lab 11/01/24  0411   WBC 11.0   HGB 9.1*   .0*   NE 7.59     CT ABDOMEN+PELVIS(CPT=74176)  Narrative: PROCEDURE:  CT ABDOMEN+PELVIS (CPT=74176)     COMPARISON:  [facility] , CT, CT ABDOMEN+PELVIS(CONTRAST ONLY)(CPT=74177), 10/31/2024, 11:35 AM.  [facility] , CT, CTA ABD/PEL (CPT=74174), 10/31/2024, 1:05 PM.     INDICATIONS:  pelvic hematoma check for stability, if enlarged will need CTA     TECHNIQUE:  Unenhanced multislice CT scanning was performed from the dome of the diaphragm to the pubic symphysis.  Dose reduction techniques were used. Dose information is transmitted to the ACR (American College of Radiology) NRDR (National Radiology   Data Registry) which includes the Dose Index Registry.     PATIENT STATED HISTORY: (As transcribed by Technologist)    pelvic hematoma check for stability, if enlarged will need CTA       FINDINGS:    LIVER:  No enlargement, atrophy, abnormal density, or significant focal lesion.    BILIARY:  There has been a prior cholecystectomy.  PANCREAS:  No lesion,  fluid collection, ductal dilatation, or atrophy.    SPLEEN:  No enlargement or focal lesion.    KIDNEYS:  Retained contrast in kidneys is noted from the prior CT of the chest.  ADRENALS:  No mass or enlargement.    AORTA/VASCULAR:    Unremarkable as seen on non-contrast imaging.   RETROPERITONEUM:  No mass or adenopathy.    BOWEL/MESENTERY:  No visible mass, obstruction, or bowel wall thickening.    ABDOMINAL WALL:  There are postoperative changes from recent hysterectomy.  URINARY BLADDER:  There is a Cantrell catheter in the bladder.  Bladder is markedly displaced by the pelvic hematoma.  PELVIC NODES:  No adenopathy.    PELVIC ORGANS:  Right pelvic hematoma is noted.  The intrapelvic component of this measures 12.4 x 10 cm in maximum axial dimensions (series 3, image 119) and previously measured 13.2 x 11.1 cm on a similar image.  The portion of the hematoma within the   abdominal wall measured for example series 3, image 123 measures 12.5 x 3.7 cm (previously 12.5 x 4.6 cm).  There is increase in density consistent with clot retraction in a part of the hematoma in the right retroperitoneum.  This would be seen for   example series 3, image 91 to measure 5.6 x 4.2 cm (on the prior study there is a lower attenuation fluid collection here which measured 7.3 x 5.6 cm).  Overall the amount of hemorrhage within the pelvis does appear slightly decreased in size as compared   to the prior study.  BONES:  No bony lesion or fracture.    LUNG BASES:  No visible pulmonary or pleural disease.    OTHER:  Negative.       IMPRESSION/PLAN:  Carmela Bell is a 24 year old  POD#7 from repeat  with post-operative hematoma and acute blood loss anemia.      Hx of recent RLE DVT 10/27 on therapeutic lovenox  - anticoagulation held  - no further evidence of DVT on US yesterday  Intraperitoneal and rectus sheath hematoma    S/p pRBC transfusion; Hgb stable 9.1 this AM, coags wnl   Hematoma intraperitoneal component  13.2 x 11.1cm, rectus sheath component 12.5 x 4.6cm   No active extravasation on CTA   Repeat CT today with no increase in hematoma size   Hematoma with displacement  of the bladder and suspected ureteral compression    RLE swelling and neuropathy likely secondary to compression as well     Currently without evidence of ongoing active bleeding.  Plan discussed with general surgery and ICU team.   Plan conservative management given significant risks with ex-lap for hematoma evacuation.     Recommendations:   - Continue to hold anticoagulation; consider IR consult for IVC filter    - Urology consult with consideration of ureteral stenting for obstructive uropathy; maintain Cantrell at this time    - Compressive wrap of RLE to minimize edema     Sigita Poskocimas, DO
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
## 2024-11-01 NOTE — SPIRITUAL CARE NOTE
Spiritual Care Visit Note    Patient Name: Carmela Bell Date of Spiritual Care Visit: 24   : 2000 Primary Dx: Pelvic hematoma, postpartum (HCC)       Referred By:      Spiritual Care Taxonomy:    Intended Effects: Agatha affirmation    Methods: Assist with spiritual/Adventist practices;Collaborate with care team member;Offer spiritual/Adventist support;Offer emotional support    Interventions: Acknowledge current situation;Active listening;Explain  role;Connect someone with their agatha community/clergy    Visit Type/Summary:     - Spiritual Care: Consulted with RN prior to visit. Offered empathic listening and emotional support. Provided support for Patient's spiritual/Adventist requests. Coordinated Uatsdin Communion and verified NPO status. Patient and family expressed appreciation for  visit. Provided information regarding how to contact Spiritual Care and left a Spiritual Care information card.  remains available for follow up.    Spiritual Care support can be requested via an Epic consult. For urgent/immediate needs, please contact the On Call  at: [facility]: ext [phone]    Rev Jane Mcclelland MA  Chaplain Resident

## 2024-11-02 LAB
ALBUMIN SERPL-MCNC: 2.8 G/DL (ref 3.2–4.8)
ALBUMIN/GLOB SERPL: 1.5 {RATIO} (ref 1–2)
ALP LIVER SERPL-CCNC: 73 U/L
ALT SERPL-CCNC: 12 U/L
ANION GAP SERPL CALC-SCNC: 4 MMOL/L (ref 0–18)
AST SERPL-CCNC: 15 U/L (ref ?–34)
BASOPHILS # BLD AUTO: 0.02 X10(3) UL (ref 0–0.2)
BASOPHILS NFR BLD AUTO: 0.2 %
BILIRUB SERPL-MCNC: 0.4 MG/DL (ref 0.3–1.2)
BUN BLD-MCNC: 7 MG/DL (ref 9–23)
CALCIUM BLD-MCNC: 7.7 MG/DL (ref 8.7–10.4)
CHLORIDE SERPL-SCNC: 108 MMOL/L (ref 98–112)
CO2 SERPL-SCNC: 26 MMOL/L (ref 21–32)
CREAT BLD-MCNC: 0.36 MG/DL
EGFRCR SERPLBLD CKD-EPI 2021: 143 ML/MIN/1.73M2 (ref 60–?)
EOSINOPHIL # BLD AUTO: 0.02 X10(3) UL (ref 0–0.7)
EOSINOPHIL NFR BLD AUTO: 0.2 %
ERYTHROCYTE [DISTWIDTH] IN BLOOD BY AUTOMATED COUNT: 15.1 %
GLOBULIN PLAS-MCNC: 1.9 G/DL (ref 2–3.5)
GLUCOSE BLD-MCNC: 114 MG/DL (ref 70–99)
HCT VFR BLD AUTO: 23.2 %
HGB BLD-MCNC: 8 G/DL
HGB BLD-MCNC: 8.1 G/DL
HGB BLD-MCNC: 8.6 G/DL
HGB BLD-MCNC: 9.1 G/DL
IMM GRANULOCYTES # BLD AUTO: 0.11 X10(3) UL (ref 0–1)
IMM GRANULOCYTES NFR BLD: 1.2 %
LYMPHOCYTES # BLD AUTO: 1.41 X10(3) UL (ref 1–4)
LYMPHOCYTES NFR BLD AUTO: 15.7 %
MAGNESIUM SERPL-MCNC: 1.7 MG/DL (ref 1.6–2.6)
MCH RBC QN AUTO: 33.1 PG (ref 26–34)
MCHC RBC AUTO-ENTMCNC: 34.9 G/DL (ref 31–37)
MCV RBC AUTO: 94.7 FL
MONOCYTES # BLD AUTO: 1.2 X10(3) UL (ref 0.1–1)
MONOCYTES NFR BLD AUTO: 13.3 %
NEUTROPHILS # BLD AUTO: 6.23 X10 (3) UL (ref 1.5–7.7)
NEUTROPHILS # BLD AUTO: 6.23 X10(3) UL (ref 1.5–7.7)
NEUTROPHILS NFR BLD AUTO: 69.4 %
OSMOLALITY SERPL CALC.SUM OF ELEC: 285 MOSM/KG (ref 275–295)
PLATELET # BLD AUTO: 145 10(3)UL (ref 150–450)
POTASSIUM SERPL-SCNC: 3.6 MMOL/L (ref 3.5–5.1)
PROT SERPL-MCNC: 4.7 G/DL (ref 5.7–8.2)
RBC # BLD AUTO: 2.45 X10(6)UL
SODIUM SERPL-SCNC: 138 MMOL/L (ref 136–145)
WBC # BLD AUTO: 9 X10(3) UL (ref 4–11)

## 2024-11-02 PROCEDURE — 99233 SBSQ HOSP IP/OBS HIGH 50: CPT | Performed by: EMERGENCY MEDICINE

## 2024-11-02 RX ORDER — MAGNESIUM OXIDE 400 MG/1
400 TABLET ORAL ONCE
Status: COMPLETED | OUTPATIENT
Start: 2024-11-02 | End: 2024-11-02

## 2024-11-02 NOTE — PLAN OF CARE
Received pt AOX4, on R/A later transitioned to 2L via N/C during sleep.  Pt C/O Abd pain, Dilaudid given for pain control.  Pt expresses pain relived after being medicated.  Will continue to monitor and assess.       Problem: Patient/Family Goals  Goal: Patient/Family Long Term Goal  Description: Patient's Long Term Goal:     Interventions:  -   - See additional Care Plan goals for specific interventions  Outcome: Progressing  Goal: Patient/Family Short Term Goal  Description: Patient's Short Term Goal:     Interventions:   -   - See additional Care Plan goals for specific interventions  Outcome: Progressing     Problem: PAIN - ADULT  Goal: Verbalizes/displays adequate comfort level or patient's stated pain goal  Description: INTERVENTIONS:  - Encourage pt to monitor pain and request assistance  - Assess pain using appropriate pain scale  - Administer analgesics based on type and severity of pain and evaluate response  - Implement non-pharmacological measures as appropriate and evaluate response  - Consider cultural and social influences on pain and pain management  - Manage/alleviate anxiety  - Utilize distraction and/or relaxation techniques  - Monitor for opioid side effects  - Notify MD/LIP if interventions unsuccessful or patient reports new pain  - Anticipate increased pain with activity and pre-medicate as appropriate  Outcome: Progressing     Problem: CARDIOVASCULAR - ADULT  Goal: Maintains optimal cardiac output and hemodynamic stability  Description: INTERVENTIONS:  - Monitor vital signs, rhythm, and trends  - Monitor for bleeding, hypotension and signs of decreased cardiac output  - Evaluate effectiveness of vasoactive medications to optimize hemodynamic stability  - Monitor arterial and/or venous puncture sites for bleeding and/or hematoma  - Assess quality of pulses, skin color and temperature  - Assess for signs of decreased coronary artery perfusion - ex. Angina  - Evaluate fluid balance, assess for  edema, trend weights  Outcome: Progressing     Problem: HEMATOLOGIC - ADULT  Goal: Maintains hematologic stability  Description: INTERVENTIONS  - Assess for signs and symptoms of bleeding or hemorrhage  - Monitor labs and vital signs for trends  - Administer supportive blood products/factors, fluids and medications as ordered and appropriate  - Administer supportive blood products/factors as ordered and appropriate  Outcome: Progressing  Goal: Free from bleeding injury  Description: (Example usage: patient with low platelets)  INTERVENTIONS:  - Avoid intramuscular injections, enemas and rectal medication administration  - Ensure safe mobilization of patient  - Hold pressure on venipuncture sites to achieve adequate hemostasis  - Assess for signs and symptoms of internal bleeding  - Monitor lab trends  - Patient is to report abnormal signs of bleeding to staff  - Avoid use of toothpicks and dental floss  - Use electric shaver for shaving  - Use soft bristle tooth brush  - Limit straining and forceful nose blowing  Outcome: Progressing
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
## 2024-11-02 NOTE — PROGRESS NOTES
ICU  Critical Care APRN Progress Note    NAME: Carmela Bell - ROOM: D1/D1 - MRN: [id number] - Age: 24 year old - :2000    Subjective:  No acute events overnight.  Patient with improvement in her pain.      OBJECTIVE  Vitals:  /68   Pulse 116   Temp 99.8 °F (37.7 °C) (Temporal)   Resp 23   Ht 175.3 cm (5' 9\")   Wt 286 lb 13.1 oz (130.1 kg)   SpO2 98%   Breastfeeding Yes   BMI 42.36 kg/m²                Physical Exam:    General Appearance: Alert, cooperative, mild distress, appears stated age  Neck: No JVD, neck supple, no adenopathy, trachea midline, no carotid bruits  Lungs: Clear to auscultation bilaterally, respirations unlabored  Heart: Regular rate and rhythm, S1 and S2 normal, no murmur, rub or gallop  Abdomen: Soft, non-tender, bowel sounds active all four quadrants, no masses, no organomegaly,  incision c/d/I, epidural site without bruising or swelling.   Extremities: Extremities normal, atraumatic, no cyanosis,capillary refill <3 sec. RLE with some edema   Pulses: 2+ and symmetric all extremities  Skin: Skin color, texture, turgor normal for ethnicity, no rashes or lesions, warm and dry  Neurologic: CNII-XII intact, normal strength    Data this admission:  CTA ABD/PEL (CPT=74174)    Result Date: 10/31/2024  CONCLUSION:  1. Stable large pelvic hematoma with fluid- fluid hematocrit levels noted unchanged from a previous recent CT performed at 1135 hours on 10/31/2024.  There is also a stable large rectus sheath hematoma.  There is no contrast extravasation identified within the pelvic or abdominal wall hematomata.  No evidence for active arterial hemorrhage. 2. There is resultant mass effect upon the pelvic structures.  There is moderate bilateral hydronephrosis and hydroureter secondary to the mass effect upon the pelvic structures.  The urinary bladder is markedly effaced and displaced to the left.  There is a delayed dense right nephrogram with delayed excretion into  the right renal collecting system and ureter suggesting significant obstruction.  3. Changes of recent postpartum state noted. 4. Please see above for details.    LOCATION:  [facility]   Dictated by ([facility]): Keshawn Michael MD on 10/31/2024 at 1:20 PM     Finalized by (CST): Keshawn Michael MD on 10/31/2024 at 1:42 PM       CT ABDOMEN+PELVIS(CONTRAST ONLY)(CPT=74177)    Result Date: 10/31/2024  CONCLUSION:  1. Large pelvic hematoma measuring 13.8 x 10.5 x 12.5 cm with fluid fluid levels.  If clinical concern for active extravasation, consider CTA.  This hematoma causes mass effect on the bladder and uterus. 2. Mild bilateral hydronephrosis hydronephrosis the right greater than left. 3. Postpartum uterus. 4. There is fluid noted within the inferior right rectus muscle which may represent hematoma as well.    LOCATION:  [facility]   Dictated by ([facility]): Dory Denny MD on 10/31/2024 at 12:07 PM     Finalized by (CST): Dory Denny MD on 10/31/2024 at 12:13 PM       US VENOUS DOPPLER LEG RIGHT - DIAG IMG (CPT=93971)    Result Date: 10/31/2024  CONCLUSION:  No evidence of deep vein thrombus in the right lower extremity.   LOCATION:  [facility]    Dictated by ([facility]): True Wallace MD on 10/31/2024 at 10:53 AM     Finalized by (CST): True Wallace MD on 10/31/2024 at 10:55 AM       US VENOUS DOPPLER LEG BILAT - DIAG IMG (CPT=93970)    Result Date: 10/27/2024  CONCLUSION:  There is complete DVT in 1 of the paired proximal right posterior tibial veins.  Left leg is negative for DVT.  Critical exam results phoned to nurse Beverly on 10/27/2024 at 1457 hours with read back.   LOCATION:  [facility]   Dictated by ([facility]): Suma Keith MD on 10/27/2024 at 2:57 PM     Finalized by ([facility]): Suma Keith MD on 10/27/2024 at 2:58 PM         Labs:  Lab Results   Component Value Date    WBC 9.0 11/02/2024    HGB 8.1 11/02/2024    HCT 23.2 11/02/2024    .0 11/02/2024    CREATSERUM 0.36 11/02/2024    BUN 7 11/02/2024      2024    K 3.6 2024     2024    CO2 26.0 2024     2024    CA 7.7 2024    ALB 2.8 2024    ALKPHO 73 2024    BILT 0.4 2024    TP 4.7 2024    AST 15 2024    ALT 12 2024    MG 1.7 2024       Assessment/Plan:    Hematoma pelvis s/p , mild hydronephrosis  -hold lovenox  -transfuse for Hgb <7  -pain medication   -bowel regimen  -OB following  -Urology on consult   -vasopressors as needed for SBP <90, not on at this time  -Abdominal binder  -General surgery consulted for rectus sheath hematoma    Hx DVT/PE  -hold lovenox  -hematology following  -repeat RLE US without evidence of DVT, LLE without DVT  -CTA chest negative for PE  -IVC filter placed     Depression/anxiety  -continue home medications    Hypothyroid  -continue home medications    Asthma  -rescue inhaler as needed    F/E/N  -CLD  -replete electrolytes as needed    Proph  -Hold A/C at this time  -SCD    Dispo  -Full code  -ICU, consider transfer to floor    Plan of care discussed with intensivist on-call, Dr Biswas.    Elvia Clay, [facility]  Critical Care NP  Phone [phone]    Agree with A/P as above.  Overall patient received her IVC filter yesterday  As far as bilateral ureteral stent placement we will touch base with urology but preliminarily they are favoring Cantrell catheter placement as his.  Will verify.  Overall hemoglobin stays stable IV fluids should be discontinued so as not to confound blood loss with dilution.  Will space out H&H from our standpoint patient can go to the floor.  Will get up and ambulate today    35 minutes follow-up care time spent with this patient with suspected pelvic hematoma date of service

## 2024-11-02 NOTE — PROGRESS NOTES
[facility] Progress Note      Patient Name: Carmela Bell   YOB: 2000  Medical Record Number: [id number]  Attending Physician: Oneal Steven M.D.     The 21st Century Cures Act makes medical notes like these available to patients in the interest of transparency. Please be advised this is a medical document. Medical documents are intended to carry relevant information, facts as evident, and the clinical opinion of the practitioner. The medical note is intended as peer to peer communication and may appear blunt or direct. It is written in medical language and may contain abbreviations or verbiage that are unfamiliar.      Patient s/p IVC filter placement. Counts are stable. Patient will remain off anticoagulation for now.    Electronically signed by:    Oneal Steven M.D.

## 2024-11-02 NOTE — PROGRESS NOTES
[facility]  Progress Note    Carmela Bell Patient Status:  Inpatient    2000 MRN [id number]   Location [facility] 4SW-A Attending Kash Colin MD   Hosp Day # 2 PCP ILENE MOODY     Subjective:  Patient is feeling better and no issues overnight.  She had IVC filter placed in.  Awaiting for the urology consult regarding possible stent placement.  She is still able to void without problem.    Objective/Physical Exam:  General: No apparent distress.  Vital Signs:  Blood pressure 105/68, pulse 116, temperature 99.8 °F (37.7 °C), temperature source Temporal, resp. rate 23, height 5' 9\" (1.753 m), weight 286 lb 13.1 oz (130.1 kg), SpO2 98%, currently breastfeeding.    Abdomen:  incision clean and dry on the  site  Extremities:  right foot +2 and left foot +1 edema      Intake/Output Summary (Last 24 hours) at 2024 0754  Last data filed at 2024 0604  Gross per 24 hour   Intake 100 ml   Output 1450 ml   Net -1350 ml       Labs:  Lab Results   Component Value Date    WBC 9.0 2024    HGB 8.1 2024    HCT 23.2 2024    .0 2024    CREATSERUM 0.36 2024    BUN 7 2024     2024    K 3.6 2024     2024    CO2 26.0 2024     2024    CA 7.7 2024    ALB 2.8 2024    ALKPHO 73 2024    BILT 0.4 2024    TP 4.7 2024    AST 15 2024    ALT 12 2024    MG 1.7 2024       Assessment/Plan: pelvic hematoma s/p  from anticoagulation      -hemodynamically stable    -no general surgical issues  -recommend starting with clears and advance as clinically indicated  -will sign off and please call as needed  -voiced understanding    Alisa Landaverde MD  2024  7:54 AM
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
## 2024-11-02 NOTE — PROGRESS NOTES
S: pt has much less pain today. +flatus, no BM. No light headedness.   O:  Temp:  [98 °F (36.7 °C)-100.6 °F (38.1 °C)] 98.9 °F (37.2 °C)  Pulse:  [] 118  Resp:  [18-32] 28  BP: ()/(60-78) 117/78  SpO2:  [89 %-100 %] 95 %  Abd- soft, slightly distended, tender. Incision- CDI  Extr-3+ edema, NT bilat  Recent Labs   Lab 10/31/24  2239 11/01/24  0411 11/01/24  1339 11/02/24  0106 11/02/24  0453 11/02/24  0812   RBC 2.59* 2.80*  --   --  2.45*  --    HGB 8.6* 9.1*   < > 8.6* 8.1* 8.0*   HCT 25.0* 26.7*  --   --  23.2*  --    MCV 96.5 95.4  --   --  94.7  --    MCH 33.2 32.5  --   --  33.1  --    MCHC 34.4 34.1  --   --  34.9  --    RDW 14.8 14.8  --   --  15.1  --    NEPRELIM 8.47* 7.59  --   --  6.23  --    WBC 11.9* 11.0  --   --  9.0  --    .0 142.0*  --   --  145.0*  --     < > = values in this interval not displayed.     A/p:  POD#8 s/p c/s  Placed on therapeutic lovenox due to LE DVT and had intra-abdominal bleeding probably from rectus muscles. Now seems to have stopped. Now she is dealing with symptoms of pain and pressure due to large hematoma. Pain is improving.   Hemodynamically stable s/p 3 units PRBC's  Transfer to med surg and observe for another 24 hours. If still stable tomorrow, then will be able to go home.

## 2024-11-03 LAB
ALBUMIN SERPL-MCNC: 2.9 G/DL (ref 3.2–4.8)
ALBUMIN/GLOB SERPL: 1.5 {RATIO} (ref 1–2)
ALP LIVER SERPL-CCNC: 72 U/L
ALT SERPL-CCNC: 10 U/L
ANION GAP SERPL CALC-SCNC: 5 MMOL/L (ref 0–18)
AST SERPL-CCNC: 12 U/L (ref ?–34)
BASOPHILS # BLD AUTO: 0.02 X10(3) UL (ref 0–0.2)
BASOPHILS NFR BLD AUTO: 0.2 %
BILIRUB SERPL-MCNC: 0.4 MG/DL (ref 0.3–1.2)
BLOOD TYPE BARCODE: 5100
BUN BLD-MCNC: 6 MG/DL (ref 9–23)
CALCIUM BLD-MCNC: 8.1 MG/DL (ref 8.7–10.4)
CHLORIDE SERPL-SCNC: 106 MMOL/L (ref 98–112)
CO2 SERPL-SCNC: 27 MMOL/L (ref 21–32)
CREAT BLD-MCNC: 0.31 MG/DL
EGFRCR SERPLBLD CKD-EPI 2021: 149 ML/MIN/1.73M2 (ref 60–?)
EOSINOPHIL # BLD AUTO: 0.03 X10(3) UL (ref 0–0.7)
EOSINOPHIL NFR BLD AUTO: 0.4 %
ERYTHROCYTE [DISTWIDTH] IN BLOOD BY AUTOMATED COUNT: 14.6 %
GLOBULIN PLAS-MCNC: 2 G/DL (ref 2–3.5)
GLUCOSE BLD-MCNC: 105 MG/DL (ref 70–99)
HCT VFR BLD AUTO: 23.4 %
HGB BLD-MCNC: 7.9 G/DL
HGB BLD-MCNC: 7.9 G/DL
HGB BLD-MCNC: 8.4 G/DL
IMM GRANULOCYTES # BLD AUTO: 0.12 X10(3) UL (ref 0–1)
IMM GRANULOCYTES NFR BLD: 1.5 %
LYMPHOCYTES # BLD AUTO: 1.24 X10(3) UL (ref 1–4)
LYMPHOCYTES NFR BLD AUTO: 15.2 %
MAGNESIUM SERPL-MCNC: 1.5 MG/DL (ref 1.6–2.6)
MCH RBC QN AUTO: 32.5 PG (ref 26–34)
MCHC RBC AUTO-ENTMCNC: 33.8 G/DL (ref 31–37)
MCV RBC AUTO: 96.3 FL
MONOCYTES # BLD AUTO: 0.98 X10(3) UL (ref 0.1–1)
MONOCYTES NFR BLD AUTO: 12 %
NEUTROPHILS # BLD AUTO: 5.75 X10 (3) UL (ref 1.5–7.7)
NEUTROPHILS # BLD AUTO: 5.75 X10(3) UL (ref 1.5–7.7)
NEUTROPHILS NFR BLD AUTO: 70.7 %
OSMOLALITY SERPL CALC.SUM OF ELEC: 284 MOSM/KG (ref 275–295)
PLATELET # BLD AUTO: 163 10(3)UL (ref 150–450)
POTASSIUM SERPL-SCNC: 3.6 MMOL/L (ref 3.5–5.1)
PROCALCITONIN SERPL-MCNC: 0.1 NG/ML (ref ?–0.05)
PROT SERPL-MCNC: 4.9 G/DL (ref 5.7–8.2)
RBC # BLD AUTO: 2.43 X10(6)UL
SODIUM SERPL-SCNC: 138 MMOL/L (ref 136–145)
UNIT VOLUME: 350 ML
WBC # BLD AUTO: 8.1 X10(3) UL (ref 4–11)

## 2024-11-03 PROCEDURE — 99291 CRITICAL CARE FIRST HOUR: CPT | Performed by: EMERGENCY MEDICINE

## 2024-11-03 RX ORDER — MAGNESIUM OXIDE 400 MG/1
800 TABLET ORAL ONCE
Status: COMPLETED | OUTPATIENT
Start: 2024-11-03 | End: 2024-11-03

## 2024-11-03 NOTE — PHYSICAL THERAPY NOTE
PHYSICAL THERAPY EVALUATION - INPATIENT     Room Number: 463/463-A  Evaluation Date: 11/3/2024  Type of Evaluation: Initial  Physician Order: PT Eval and Treat    Presenting Problem: abdominal pain, R leg pain s/p 6 days   Co-Morbidities : anxiety, depression, asthma,  Reason for Therapy: Mobility Dysfunction and Discharge Planning    PHYSICAL THERAPY ASSESSMENT   Patient is a 24 year old female admitted 10/31/2024 for abdominal pain and R leg pain.   Patient is currently functioning near baseline with bed mobility, transfers, gait, and stair negotiation. Prior to admission, patient's baseline is independent.     Patient with no additional skilled services but increased support at home.    PLAN  Patient has been evaluated and presents with no skilled Physical Therapy needs at this time.  Patient discharged from Physical Therapy services.  Please re-order if a new functional limitation presents during this admission.    PT Device Recommendation: Rolling walker    GOALS  Patient was able to achieve the following goals ...    Patient was able to transfer Safely and independently   Patient able to ambulate on level surfaces Safely and independently     HOME SITUATION  Type of Home: House  Home Layout: Multi-level  Stairs to Enter : 2   Railing: Yes    Stairs to Bedroom: 8 (8 going up and 8 going down)    Railing: Yes    Lives With: Spouse;Family    Drives: Yes   Patient Regularly Uses: None     Prior Level of [place]: Pt reports fully independent at home, living in a ranch home with child. Across from home are her parents who are ready to assist as well. Per pt's mom, pt will be staying with them after discharge.     SUBJECTIVE  \" I don't hurt at all\"    OBJECTIVE  Precautions: Abdominal protective strategies  Fall Risk: Standard fall risk    PAIN ASSESSMENT  Ratin          COGNITION  Overall Cognitive Status:  WFL - within functional limits    RANGE OF MOTION AND STRENGTH ASSESSMENT  Upper extremity  ROM and strength are within functional limits     Lower extremity ROM is within functional limits     Lower extremity strength is within functional limits     BALANCE  Static Sitting: Good  Dynamic Sitting: Good  Static Standing: Fair +  Dynamic Standing: Fair +      AM-PAC '6-Clicks' INPATIENT SHORT FORM - BASIC MOBILITY  How much difficulty does the patient currently have...  Patient Difficulty: Turning over in bed (including adjusting bedclothes, sheets and blankets)?: None   Patient Difficulty: Sitting down on and standing up from a chair with arms (e.g., wheelchair, bedside commode, etc.): None   Patient Difficulty: Moving from lying on back to sitting on the side of the bed?: None   How much help from another person does the patient currently need...   Help from Another: Moving to and from a bed to a chair (including a wheelchair)?: None   Help from Another: Need to walk in hospital room?: None   Help from Another: Climbing 3-5 steps with a railing?: None       AM-PAC Score:  Raw Score: 24   Approx Degree of Impairment: 0%   Standardized Score (AM-PAC Scale): 61.14   CMS Modifier (G-Code): CH    FUNCTIONAL ABILITY STATUS  Gait Assessment   Functional Mobility/Gait Assessment  Gait Assistance: Supervision  Distance (ft): 150  Assistive Device: Rolling walker  Pattern: Within Functional Limits  Stairs: Stairs  How Many Stairs: 8  Device: 2 Rails  Assist: Supervision  Pattern: Ascend and Descend  Ascend and Descend : Step to    Skilled Therapy Provided     Transfer Mobility:  Sit to stand: supervision   Stand to sit: supervision  Gait = supervision    Therapist's comments:RN cleared pt for session. Pt received seated on bedside chair reported that she performed supine to sit transfer by herself earlier in the day. Pt performed above functional mobility with no reports of pain. Pt educated on abdominal protective strategies during transfer. Pt educated on proper gait sequence during stair negotiation. Pt  verbalized understanding and agreement, demonstrated good return. Pt performed toileting, transfer and hygiene modified independent. RN made aware of session and recommendation.     Exercise/Education Provided:  Bed mobility  Body mechanics  Energy conservation  Functional activity tolerated  Gait training  Posture  Transfer training  Stair negotiation    Patient End of Session: Up in chair;Needs met;Call light within reach;RN aware of session/findings;All patient questions and concerns addressed;Hospital anti-slip socks;Family present    Patient Evaluation Complexity Level:  History Moderate - 1 or 2 personal factors and/or co-morbidities   Examination of body systems Low -  addressing 1-2 elements   Clinical Presentation Low- Stable   Clinical Decision Making Low Complexity       PT Session Time: 30 minutes  Gait Training: 10 minutes  Therapeutic Activity: 15 minutes  Neuromuscular Re-education:  minutes  Therapeutic Exercise:  minutes

## 2024-11-03 NOTE — PROGRESS NOTES
[facility] Hospitalist Progress Note                                                                     [facility]   part of [facility]      Carmela Bell  7/30/2000    SUBJECTIVE: no chest pain, palpitation, shortness of breath, cough, nausea, vomiting, abdominal pain.     OBJECTIVE:  Temp:  [97 °F (36.1 °C)-100.4 °F (38 °C)] 97.8 °F (36.6 °C)  Pulse:  [] 102  Resp:  [14-34] 24  BP: (105-128)/(51-81) 116/68  SpO2:  [93 %-98 %] 94 %  Exam  Gen: No acute distress, alert and oriented   Pulm: Lungs clear bilaterally, normal respiratory effort, no crackles, no wheezing  CV: Heart with regular rate and rhythm, no murmur.   Abd: Abdomen soft, no new tender, nondistended, bowel sounds present  MSK: No significant pitting edema or tenderness of the LE  Skin: no new rashes or lesions    Labs:   Recent Labs   Lab 10/31/24  0932 10/31/24  1252 10/31/24  1527 10/31/24  2239 11/01/24  0411 11/01/24  1339 11/02/24  0106 11/02/24  0453 11/02/24  0812 11/02/24  1546 11/03/24  0444   WBC 12.9* 12.9*  --  11.9* 11.0  --   --  9.0  --   --  8.1   HGB 9.7* 7.6*   < > 8.6* 9.1*   < > 8.6* 8.1* 8.0* 9.1* 7.9*  7.9*   .0 98.7  --  96.5 95.4  --   --  94.7  --   --  96.3   .0 178.0  --  154.0 142.0*  --   --  145.0*  --   --  163.0   INR 0.96  --   --   --  1.00  --   --   --   --   --   --     < > = values in this interval not displayed.       Recent Labs   Lab 10/31/24  0932 11/01/24  0411 11/02/24  0453 11/03/24  0444   * 135* 138 138   K 3.4* 4.1  4.1 3.6 3.6    108 108 106   CO2 26.0 24.0 26.0 27.0   BUN 12 10 7* 6*   CREATSERUM 0.61 0.43* 0.36* 0.31*   CA 9.0 8.1* 7.7* 8.1*   MG  --  1.5* 1.7 1.5*   PHOS  --  3.1  --   --    * 111* 114* 105*       Recent Labs   Lab 10/31/24  0932 11/01/24  0411 11/02/24  0453 11/03/24  0444   ALT 22 15 12 10   AST 22 15 15 12   ALB 3.3 2.6* 2.8* 2.9*       No results for input(s): \"PGLU\" in the last 168  hours.    Meds:   Scheduled:    magnesium oxide  800 mg Oral Once    folic acid  1 mg Oral Daily    cyanocobalamin  1,000 mcg Oral Daily    docusate sodium  100 mg Oral BID    sennosides  8.6 mg Oral BID     Continuous Infusions:   PRN:   acetaminophen **OR** HYDROcodone-acetaminophen **OR** HYDROcodone-acetaminophen    polyethylene glycol (PEG 3350)    sennosides    bisacodyl    fleet enema    ASSESSMENT / PLAN:   24 year old female with history of anxiety, depression, asthma, hypothyroidism, migraines, sleep apnea presenting for abdominal pain.     Rectus Sheath Hematoma  -recent   -OB/Gyn following  -surgery following  -heme following  -Urology consulted  -repeat ct shows stable size, no active bleeding  -s/p prbc on admission, hgb stable after  -vitals stable after  -holding AC for now  -no surgical intervention needed  -continue supportive care     Anemia--> lower this am  -acute blood loss  -transfused on admission  -hgb lower today, recheck in afternoon   -secondary to above  -hold AC     DVT   -recent  -was on lovenox, stopped due to anemia, hematoma  -IVC filter placed per Heme rec     Quality:  DVT Prophylaxis: scd  CODE status: Full Code  Cantrell: none     Plan of care discussed with patient and staff     Dispo: possible discharge pending repeat hgb and per primary service      Doug Castaneda MD  [facility] Hospitalist  [phone]

## 2024-11-03 NOTE — PROGRESS NOTES
GERIATRIC MEDICINE HISTORY AND PHYSICAL   Chuck Angelo   74year old male   MRN: [id number]   : 1946     CHIEF COMPLAINT:   Chief Complaint   Patient presents with   â¢ Follow-up     NH 2 month visit     HISTORY OF PRESENT ILLNESS:   Nettie Long, 74year old, comes in today unaccompanied  for 60 day follow-up from nursing home. He also is on hospice. He has had general decline. He now is having some problems with ulcerations and edema on his legs but this is significantly improved today with no open lesions. He has difficulty with swallowing but his guardian wishes him to eat soft mechanical foods as he refused to eat when he was on pureed. It is thought that he probably has some normal pressure hydrocephalus although it is not clear whether or not this is the main cause of his dementia. He has a history of COPD and hypertension which is stable. Uzma Buckley himself is very minimally verbal. There were no other concerns per paper work and no secondary source of information    REVIEW OF SYSTEMS:   Unable to get a meaningful review of systems due to the degree of dementia. ALLERGIES:  Lisinopril     MEDICATIONS:   Current Outpatient Medications   Medication Sig Dispense Refill   â¢ Cholecalciferol (VITAMIN D) 2000 units tablet Take 2,000 Units by mouth daily. â¢ LORazepam (ATIVAN) 0.5 MG tablet Take 1 tablet by mouth every 6 hours as needed for Anxiety (insomnia). 90 tablet 1   â¢ polyethylene glycol-propylene glycol 0.4-0.3 % Solution Apply 2 drops to eye 4 times daily as needed (irritation). Both eyes 1 Bottle 0   â¢ melatonin 3 MG Take 3 mg by mouth nightly as needed. â¢ Magnesium Hydroxide (MILK OF MAGNESIA PO) Take by mouth as needed. â¢ aspirin (ECOTRIN) 81 MG EC tablet Take 1 tablet by mouth daily. 30 tablet 5   â¢ losartan (COZAAR) 25 MG tablet Take 1 tablet by mouth daily. 30 tablet 0   â¢ metoPROLOL tartrate (LOPRESSOR) 25 MG tablet Take 1 tablet by mouth every 12 hours.  60 tablet 0   â¢ DISPENSE S: pt without complaints.  Positive flatus. Pain improving  O: VS-Temp:  [97 °F (36.1 °C)-100.4 °F (38 °C)] 97.8 °F (36.6 °C)  Pulse:  [] 102  Resp:  [14-34] 24  BP: (105-128)/(51-81) 116/68  SpO2:  [93 %-98 %] 94 %       Abdomen: soft, ND, NT  Incision- CDI       Extremities: no edema, NT Bilateral lower extremities       CBC:   Lab Results   Component Value Date    WBC 8.1 11/03/2024    RBC 2.43 11/03/2024    HGB 7.9 11/03/2024    HGB 7.9 11/03/2024    HCT 23.4 11/03/2024    MCV 96.3 11/03/2024    MCH 32.5 11/03/2024    MCHC 33.8 11/03/2024    RDW 14.6 11/03/2024    .0 11/03/2024     A/P:      1. POD #9 s/p C/S      2. Intra-abdominal hematoma after anticoagulation due to DVT. Stable at present without evidence of ongoing bleeding.       3. Intermittent fevers- spoke with ICU doc. Check blood cultures. Hold off on antibiotics for now. Will start if fevers progress      4. Hb stable      5. Continue inpatient observation for now     Depends undergarment. Use as directed 50 each 5   â¢ DISPENSE Wheeled walker. 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 â¢ Ca Carbonate-Mag Hydroxide (MI-ACID PO) Take by mouth as needed. â¢ Sodium Phosphates (ENEMA RE) Place rectally as needed. â¢ senna (SENOKOT) 8.6 MG tablet Take 1 tablet by mouth as needed for Constipation. No current facility-administered medications for this visit.           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   â¢ Cardiac pacemaker    â¢ Complete heart block (CMS/HCC)    â¢ Emphysema 12/9/2013   â¢ Failure to thrive in adult 9/14/2018   â¢ [place] (hard of hearing)    â¢ HTN (hypertension)    â¢ Hyperlipidemia    â¢ Lung nodule 12/9/2013   â¢ Mitral regurgitation 12/9/2013   â¢ Polycythemia vera(238.4)    â¢ Rhabdomyolysis 7/5/2012   â¢ Sarcoma (CMS/HCC)     neurilemma   â¢ Syncope    â¢ Urinary incontinence    â¢ Zenker diverticulum           PAST SURGICAL HISTORY:   Past Surgical History:   Procedure Laterality Date   â¢ Anesth,pacemaker insertion     â¢ Cataract extraction w/ intraocular lens implant Right 7.25.2016    Dr. Padron Marker   â¢ Cystourethroscopy  07/05/2005   â¢ Inser [place] pacer epicard w thoract  02/07/2006    Pacemaker, dual-chamber device   â¢ Left heart cath,percutaneous  02/06/2006    Cardiac Cath, temporary transvenous pacemaker system   â¢ Transurethral resection of prostate  08/09/2005          FAMILY HISTORY:   Family History   Problem Relation Age of Onset   â¢ Patient is unaware of any medical problems Mother    â¢ Patient is unaware of any medical problems Father    â¢ Hearing Loss Sister    â¢ Hearing Loss Brother    â¢ Hearing Loss Brother           PERSONAL AND SOCIAL HISTORY:   Social History     Socioeconomic History   â¢ Marital status:      Spouse name: Not on file   â¢ Number of children: Not on file   â¢ Years of education: Not on file   â¢ Highest education level: Not on file   Social Needs   â¢ Financial resource strain: Not on file   â¢ Food insecurity - worry: Not on file   â¢ Food insecurity - inability: Not on file   â¢ Transportation needs - medical: Not on file   â¢ Transportation needs - non-medical: Not on file   Occupational History   â¢ Not on file   Tobacco Use   â¢ Smoking status: Former Smoker     Last attempt to quit: 1988     Years since quittin.7   â¢ Smokeless tobacco: Never Used   Substance and Sexual Activity   â¢ Alcohol use: No   â¢ Drug use: No   â¢ Sexual activity: Not on file   Other Topics Concern   â¢ Not on file   Social History Narrative   â¢ Not on file          PHYSICAL EXAMINATION:   VITALS:   Visit Vitals  /64 (BP Location: [facility])   Temp 96.9 Â°F (36.1 Â°C) (Temporal Artery)   Wt 45.4 kg Comment: wheelchair scale   BMI 18.29 kg/mÂ²        Wt Readings from Last 4 Encounters:   19 45.4 kg   18 47.6 kg   18 48.5 kg   18 46.3 kg           GEN: AAO x self. No acute pain or distress. Minimally communicative   CHEST:  B/L clear. No rhonchi or crepts. Breathing comfortably. CVS:  RRR. No gallops, rub or murmur. Ext: no edema  NEURO: Diffuse weakness, somewhat contracted upper limbs  PSYCH:  Mood and affect is calm and cooperative  GAIT: not ambulatory   SKIN: areas of previous ulceration healed      ASSESSMENT:   ED Diagnosis   1. NPH (normal pressure hydrocephalus)     2. Dementia associated with other underlying disease without behavioral disturbance     3. Pulmonary emphysema, unspecified emphysema type (CMS/HCC)     4.  Failure to thrive in adult           PLAN:   Patient Instructions   Continue hospice care    Continue current plan of care    Plan follow up in office in 60 days      Barton Schwab, MD

## 2024-11-03 NOTE — PLAN OF CARE
Received patient after RN shift report. Patient alert and oriented on room air. Has transfer orders in place. Complaints of abdominal pain/cramping. See flowsheets for more information. Sinus rhythm on monitor. Cantrell intact. Patient updated on plan of care.

## 2024-11-03 NOTE — PROGRESS NOTES
ICU  Critical Care APRN Progress Note    NAME: Carmela Bell - ROOM: D1/D1 - MRN: [id number] - Age: 24 year old - :2000    Subjective:  No co mplaints this morning. Hgb down-trending, but HD stable and denies pain.       OBJECTIVE  Vitals:  /68 (BP Location: Right arm)   Pulse 102   Temp 97.8 °F (36.6 °C) (Temporal)   Resp 24   Ht 175.3 cm (5' 9\")   Wt 286 lb 13.1 oz (130.1 kg)   SpO2 94%   Breastfeeding Yes   BMI 42.36 kg/m²                Physical Exam:    General Appearance: Alert, cooperative, denies pain  Neck: No JVD  Lungs: Clear to auscultation bilaterally, respirations unlabored  Heart: Regular rate and rhythm, S1 and S2 normal, no murmur, rub or gallop  Abdomen: Obese, Soft, non-tender, bowel sounds active,  incision c/d/I, epidural site without bruising or swelling, abdominal binder intact, no signs of ecchymosis  Extremities: Extremities normal, atraumatic, no cyanosis,capillary refill <3 sec. RLE with mild edema, non-pitting  Pulses: 2+ and symmetric all extremities  Skin: Skin color, texture, turgor normal for ethnicity, no rashes or lesions, warm and dry  Neurologic: CNII-XII intact, normal strength    Data this admission:  CTA ABD/PEL (CPT=74174)    Result Date: 10/31/2024  CONCLUSION:  1. Stable large pelvic hematoma with fluid- fluid hematocrit levels noted unchanged from a previous recent CT performed at 1135 hours on 10/31/2024.  There is also a stable large rectus sheath hematoma.  There is no contrast extravasation identified within the pelvic or abdominal wall hematomata.  No evidence for active arterial hemorrhage. 2. There is resultant mass effect upon the pelvic structures.  There is moderate bilateral hydronephrosis and hydroureter secondary to the mass effect upon the pelvic structures.  The urinary bladder is markedly effaced and displaced to the left.  There is a delayed dense right nephrogram with delayed excretion into the right renal collecting  system and ureter suggesting significant obstruction.  3. Changes of recent postpartum state noted. 4. Please see above for details.    LOCATION:  [facility]   Dictated by ([facility]): Keshawn Michael MD on 10/31/2024 at 1:20 PM     Finalized by (CST): Keshawn Michael MD on 10/31/2024 at 1:42 PM       CT ABDOMEN+PELVIS(CONTRAST ONLY)(CPT=74177)    Result Date: 10/31/2024  CONCLUSION:  1. Large pelvic hematoma measuring 13.8 x 10.5 x 12.5 cm with fluid fluid levels.  If clinical concern for active extravasation, consider CTA.  This hematoma causes mass effect on the bladder and uterus. 2. Mild bilateral hydronephrosis hydronephrosis the right greater than left. 3. Postpartum uterus. 4. There is fluid noted within the inferior right rectus muscle which may represent hematoma as well.    LOCATION:  [facility]   Dictated by ([facility]): Dory Denny MD on 10/31/2024 at 12:07 PM     Finalized by (CST): Dory Denny MD on 10/31/2024 at 12:13 PM       US VENOUS DOPPLER LEG RIGHT - DIAG IMG (CPT=93971)    Result Date: 10/31/2024  CONCLUSION:  No evidence of deep vein thrombus in the right lower extremity.   LOCATION:  [facility]    Dictated by ([facility]): True Wallace MD on 10/31/2024 at 10:53 AM     Finalized by (CST): True Wallace MD on 10/31/2024 at 10:55 AM       US VENOUS DOPPLER LEG BILAT - DIAG IMG (CPT=93970)    Result Date: 10/27/2024  CONCLUSION:  There is complete DVT in 1 of the paired proximal right posterior tibial veins.  Left leg is negative for DVT.  Critical exam results phoned to nurse Beverly on 10/27/2024 at 1457 hours with read back.   LOCATION:  [facility]   Dictated by ([facility]): Suma Keith MD on 10/27/2024 at 2:57 PM     Finalized by (CST): Suma Keith MD on 10/27/2024 at 2:58 PM         Labs:  Lab Results   Component Value Date    WBC 8.1 11/03/2024    HGB 7.9 11/03/2024    HGB 7.9 11/03/2024    HCT 23.4 11/03/2024    .0 11/03/2024    CREATSERUM 0.31 11/03/2024    BUN 6 11/03/2024     11/03/2024     K 3.6 2024     2024    CO2 27.0 2024     2024    CA 8.1 2024    ALB 2.9 2024    ALKPHO 72 2024    BILT 0.4 2024    TP 4.9 2024    AST 12 2024    ALT 10 2024    MG 1.5 2024       Assessment/Plan:    Acute blood loss anemia: 2/2 below  Large pelvic and rectal sheath hematoma: S/p recent  on full dose a/c for VTE  -Repeat CTA shows decreased in size of overall hemorrhage initially seen on CT on 10/31, no acative extravasation  -Holding anticoagulation  -transfuse for goal Hgb <7  -Monitor Hgb BID  -pain medication PRN  -Urology on consult given compression on bladder and mild hydro. Plan to remove pritchard today for voiding trial. Repeat imaging as outpt in 2-3 wks  -Abdominal binder in place  -General surgery   -OB following    Hx DVT/PE, provoked suspected to be related to surgery  -Recent complete DVT in right posterior tibial vein on US 10/25--started on lovenox 1mg/kg BID  -Repeat LE dopplers negative for DVTs  -s/p IVC filter   -hold lovenox now due to hematoma  -CTA chest negative for PE  -Heme following    Low grade fevers: Unclear etiology. Low suspicion for infectious etiology. May be due to above with multiple transfusions.   -PCT on admit 0.10, repeat  0.13  -Without leukocytosis, Tmax 100.4 last 24 hours  -UA negative on admit  -B. Cxs NGTD, will repeat  -Encourage IS, OOB, ambulate  -Hold on antibiotics at this time given no signs of active infection    Constipation  -Bowel regimen    Asthma  -rescue inhaler as needed    F/E/N  -regular diet  -replete electrolytes as needed    Proph  -Hold A/C at this time  -SCDs for DVT proph    Dispo  -Full code  -stable to transfer to floor    Plan of care discussed with intensivist on-call, Dr Biswas.    Kalyn Sigala, STEPHEN-BC  ICU  Phone  [phone]   Pager [phone]    Agree with assessment and plan as above  Overall patient is doing well however she has had some low-grade  fevers.  Workup has been fairly negative CT chest to rule out PE was negative lower extremity Dopplers were negative  Urinalysis nothing significant  Really the patient is improving my suspicion is that the low-grade fevers are coming from coagulation cascade exposure to hematoma which is significant in the pelvis.  Will draw a repeat blood cultures today we will hold off on antibiotic so as not to confound issues but low threshold to start.  Doubt  suspicion for infected hematoma in this early although a possibility as temporally related.  Will remove the Cantrell catheter continue ambulation.  Hemoglobin slight decrease do not believe the patient is actively bleeding we will recheck later but patient will need to stay in the hospital not necessarily the intensive care unit  35 minutes critical care time spent with this patient with respect to ongoing management of pelvic hematoma date of service 11/3/2024

## 2024-11-03 NOTE — PLAN OF CARE
Assumed care of patient at shift change. Received patient resting in bed on RA.   Alert  RA  T max 99.3. NSR/ST on monitor.   No BM. Cantrell removed per urology, updated urology w/ void volumes and PVR.  Abdominal binder in place. Abdominal pain managed w/prn tylenol.   Ambulates w/standby assist x1 and front wheel walker.   See flowsheet/MAR.     Called report to RN at approximately 1715. Transferred patient w/belongings at approximately 1820.

## 2024-11-03 NOTE — PROGRESS NOTES
[facility] Urology Progress Note    Carmela Bell Patient Status:  Inpatient    2000 MRN [id number]   Location [facility] 4SW-A Attending Kash Colin MD   Hosp Day # 3 PCP ILENE MOODY     Subjective:  Carmela Bell is a(n) 24 year old female.    Current  complaints: She is feeling well. Minimal low abd/pelvic pain. No flank pain. No cp sob. Wants to go home.    Objective:  Temp (24hrs), Av.6 °F (37 °C), Min:97 °F (36.1 °C), Max:100.4 °F (38 °C)    /68 (BP Location: Right arm)   Pulse 97   Temp 98.7 °F (37.1 °C) (Temporal)   Resp 24   Ht 5' 9\" (1.753 m)   Wt 286 lb 13.1 oz (130.1 kg)   SpO2 99%   Breastfeeding Yes   BMI 42.36 kg/m²     Intake/Output Summary (Last 24 hours) at 11/3/2024 1057  Last data filed at 11/3/2024 0758  Gross per 24 hour   Intake --   Output 875 ml   Net -875 ml     General: Calm, NAD  HEENT: NCAT, no scleral icterus  CV: RR  Pulmonary: breathing unlabored, symmetric bilaterally, no audible wheezes  Cantrell catheter present, off traction, draining clear yellow urine to gravity        Laboratory Data:  Lab Results   Component Value Date    WBC 8.1 2024    HGB 7.9 2024    HGB 7.9 2024    HCT 23.4 2024    .0 2024    CREATSERUM 0.31 2024    BUN 6 2024     2024    K 3.6 2024     2024    CO2 27.0 2024     2024    CA 8.1 2024    MG 1.5 2024         Hospital Problem List:  Patient Active Problem List   Diagnosis    BMI (body mass index), pediatric 95-99% for age, obese child structured weight management/multidisciplinary intervention category    PTSD (post-traumatic stress disorder)    Episodic mood disorder (HCC)    Anxiety state    Subclinical hypothyroidism    Vitamin D deficiency    Contusion of right wrist, sequela    Chronic wrist pain, right    Ulnar neuropathy of right upper extremity    Acute pain of left knee     Contusion of left knee, subsequent encounter    Hip pain    History of MRSA infection    Encounter for therapeutic drug monitoring    Morbid obesity with BMI of 40.0-44.9, adult (HCC)    Thrombocytopenia (HCC)    Jaundice    Pregnancy (HCC)    Venous thromboembolism    Hyponatremia    Anemia    Pelvic hematoma, postpartum (HCC)    Hypotension due to hypovolemia    Acute blood loss anemia       IMPRESSION    1. Pelvic hematoma  -Causing extrinsic compression on bladder and slight ureteral compression with mild hydro which was improved on the most recent CT  -11/3/24 Hgb 7.9, pain much better     PLAN    1. Voiding trial  2. Expect minimal hydro will resolve with hematoma resolution   3. Can repeat imaging as outpatient in 2-3 weeks    The above impression and plan were discussed in detail with the patient who verbalized understanding and all questions were answered.    Roland Stephens D.O.  [facility]  Department of Urology      11/3/2024  10:57 AM

## 2024-11-04 ENCOUNTER — APPOINTMENT (OUTPATIENT)
Dept: ULTRASOUND IMAGING | Facility: HOSPITAL | Age: 24
End: 2024-11-04
Attending: INTERNAL MEDICINE
Payer: COMMERCIAL

## 2024-11-04 VITALS
HEIGHT: 69 IN | OXYGEN SATURATION: 99 % | TEMPERATURE: 98 F | HEART RATE: 102 BPM | SYSTOLIC BLOOD PRESSURE: 106 MMHG | WEIGHT: 286.81 LBS | RESPIRATION RATE: 20 BRPM | BODY MASS INDEX: 42.48 KG/M2 | DIASTOLIC BLOOD PRESSURE: 63 MMHG

## 2024-11-04 LAB
ALBUMIN SERPL-MCNC: 3 G/DL (ref 3.2–4.8)
ALBUMIN/GLOB SERPL: 1.4 {RATIO} (ref 1–2)
ALP LIVER SERPL-CCNC: 77 U/L
ALT SERPL-CCNC: 9 U/L
ANION GAP SERPL CALC-SCNC: 4 MMOL/L (ref 0–18)
AST SERPL-CCNC: 12 U/L (ref ?–34)
BASOPHILS # BLD AUTO: 0.02 X10(3) UL (ref 0–0.2)
BASOPHILS NFR BLD AUTO: 0.2 %
BILIRUB SERPL-MCNC: 0.5 MG/DL (ref 0.3–1.2)
BUN BLD-MCNC: 6 MG/DL (ref 9–23)
CALCIUM BLD-MCNC: 8.2 MG/DL (ref 8.7–10.4)
CHLORIDE SERPL-SCNC: 107 MMOL/L (ref 98–112)
CO2 SERPL-SCNC: 27 MMOL/L (ref 21–32)
CREAT BLD-MCNC: 0.3 MG/DL
DEPRECATED HBV CORE AB SER IA-ACNC: 233 NG/ML
EGFRCR SERPLBLD CKD-EPI 2021: 150 ML/MIN/1.73M2 (ref 60–?)
EOSINOPHIL # BLD AUTO: 0.03 X10(3) UL (ref 0–0.7)
EOSINOPHIL NFR BLD AUTO: 0.4 %
ERYTHROCYTE [DISTWIDTH] IN BLOOD BY AUTOMATED COUNT: 14.5 %
GLOBULIN PLAS-MCNC: 2.1 G/DL (ref 2–3.5)
GLUCOSE BLD-MCNC: 100 MG/DL (ref 70–99)
HCT VFR BLD AUTO: 24.8 %
HGB BLD-MCNC: 8.1 G/DL
HGB BLD-MCNC: 8.1 G/DL
IMM GRANULOCYTES # BLD AUTO: 0.13 X10(3) UL (ref 0–1)
IMM GRANULOCYTES NFR BLD: 1.6 %
LYMPHOCYTES # BLD AUTO: 1.24 X10(3) UL (ref 1–4)
LYMPHOCYTES NFR BLD AUTO: 15.1 %
MAGNESIUM SERPL-MCNC: 1.6 MG/DL (ref 1.6–2.6)
MCH RBC QN AUTO: 31.9 PG (ref 26–34)
MCHC RBC AUTO-ENTMCNC: 32.7 G/DL (ref 31–37)
MCV RBC AUTO: 97.6 FL
MONOCYTES # BLD AUTO: 0.91 X10(3) UL (ref 0.1–1)
MONOCYTES NFR BLD AUTO: 11.1 %
NEUTROPHILS # BLD AUTO: 5.89 X10 (3) UL (ref 1.5–7.7)
NEUTROPHILS # BLD AUTO: 5.89 X10(3) UL (ref 1.5–7.7)
NEUTROPHILS NFR BLD AUTO: 71.6 %
OSMOLALITY SERPL CALC.SUM OF ELEC: 284 MOSM/KG (ref 275–295)
PLATELET # BLD AUTO: 202 10(3)UL (ref 150–450)
POTASSIUM SERPL-SCNC: 3.6 MMOL/L (ref 3.5–5.1)
PROT SERPL-MCNC: 5.1 G/DL (ref 5.7–8.2)
RBC # BLD AUTO: 2.54 X10(6)UL
SODIUM SERPL-SCNC: 138 MMOL/L (ref 136–145)
WBC # BLD AUTO: 8.2 X10(3) UL (ref 4–11)

## 2024-11-04 PROCEDURE — 99233 SBSQ HOSP IP/OBS HIGH 50: CPT | Performed by: INTERNAL MEDICINE

## 2024-11-04 PROCEDURE — 93970 EXTREMITY STUDY: CPT | Performed by: INTERNAL MEDICINE

## 2024-11-04 RX ORDER — FOLIC ACID 1 MG/1
1 TABLET ORAL DAILY
Status: DISCONTINUED | OUTPATIENT
Start: 2024-11-04 | End: 2024-11-04

## 2024-11-04 RX ORDER — MELATONIN
1000 DAILY
Status: DISCONTINUED | OUTPATIENT
Start: 2024-11-04 | End: 2024-11-04

## 2024-11-04 RX ORDER — MAGNESIUM OXIDE 400 MG/1
400 TABLET ORAL ONCE
Status: COMPLETED | OUTPATIENT
Start: 2024-11-04 | End: 2024-11-04

## 2024-11-04 RX ORDER — CHOLECALCIFEROL (VITAMIN D3) 25 MCG
1 TABLET,CHEWABLE ORAL DAILY
Status: DISCONTINUED | OUTPATIENT
Start: 2024-11-04 | End: 2024-11-04

## 2024-11-04 NOTE — PROGRESS NOTES
Postop Day 10    Pt without complaints. Some cramping.  Tmax 100.2 11/3/24 at 0400.  Pt worried about post op infection (h/o sepsis with her abdominoplasty).     Temp: 98.5 °F (36.9 °C)  Pulse: 78  Resp: 16  BP: 109/58    Intake/Output Summary (Last 24 hours) at 11/4/2024 1234  Last data filed at 11/4/2024 0831  Gross per 24 hour   Intake 60 ml   Output 600 ml   Net -540 ml     abd  soft, NT, ND, fundus firm below umbilicus, incision C/D/I  extr  trace edema, no calf tenderness  Recent Labs   Lab 11/04/24  0446   RBC 2.54*   HGB 8.1*  8.1*   HCT 24.8*   MCV 97.6   MCH 31.9   MCHC 32.7   RDW 14.5   NEPRELIM 5.89   WBC 8.2   .0       Impression: POD#10, pelvic hematoma, h/o DVT now with IVC filter  Plan:  Mild temp elevation yesterday--start augmentin while blood culture pending. Can discontinue if necessary.  Ok for home today if ok with other services.  Anemia--plan iron supplement.

## 2024-11-04 NOTE — PAYOR COMM NOTE
--------------   - 3 CONTINUED STAY REVIEW    Payor: MARIANELA LEARY  Subscriber #:  [id number]  Authorization Number: [id number]    :     HEME/ONC:     24F with a PMH of PE, hypothyroidism, gastric sleeve, ITP and asthma presented on  with abdominal pain.      -She follows with Dr. Shafer at [facility]. She has history of a provoked PE/SVT suspected to be related to surgery.      -She underwent an abdominoplasty 2023. She was diagnosed on 23 with a VTE. Doppler showed a LLE superficial thrombus in the greater saphenous vein. She was noted to have large buren PE with R heart strain. She was started on eliquis but switched to therapeutic lovenox when she found out she was pregnant. She also received IV Venofer with pregnancy. During her pregnancy she was on Lovenox 80 mg BID. She stopped lovenox around 10/23/24.      -10/25/24: admitted for a . Noticed RLE swelling on 10/26/24 --> RLE doppler showed a complete DVT in the right posterior tibial vein. She is breast feeding. She was started on lovenox 1 mg/kg BID with instruction to follow up in 1 week.      -She presented on 10/31/24 with abdominal pain. Her Hb was down to 7.6. CT showed a large pelvic hematoma with mass effect on bladder and uterus and rectus sheath hematoma. CTA without active extravasation. Lovenox was held and she was given 2 units of blood and protamine. Bilateral LE dopplers negative.        Physical Exam  /63   Pulse 90   Temp 98.1 °F (36.7 °C) (Temporal)   Resp 26   Ht 1.753 m (5' 9\")   Wt 130.1 kg (286 lb 13.1 oz)   SpO2 97%   Breastfeeding Yes   BMI 42.36 kg/m²    General: NAD, AOX3  HEENT: clear op, mmm, no jvd. EOM intact, no scleral icterus   CV: RRR S1S2 no murmurs  Extremities: mild b/l edema  Lungs: CTAB, no increased work of breathing  Abd: Mild abdominal pain, surgical scars +BS no hepatosplenomegaly  Neuro: CN: II-XII grossly intact  Psych: Normal Mood and affect            Lab Results    Component Value Date     WBC 11.0 2024     HGB 9.1 (L) 2024     HCT 26.7 (L) 2024     MCV 95.4 2024     .0 (L) 2024         (L) 2024     K 4.1 2024     K 4.1 2024     CO2 24.0 2024      2024     BUN 10 2024     PHOS 3.1 2024     ALB 2.6 (L) 2024      Assessment and Plan:  Pelvic Hematoma. Rectus Sheath Hematoma  C section on 10/25/24  -related to recent  and anticoagulation. Hold Lovenox  -ICU/OB managing      Large R sided PE: dx 23 at [facility]: initially on Eliquis but switched to Lovenox during pregnancy   Lovenox 80 mg/BID (intermediate dosing) stopped 10/23/24 for C section on 10/25/24  Developed RLE DVT on 10/26/24 and started on Lovenox 1 mg/kg BID  -Bilateral dopplers on 10/31/24 negative  -Agree with holding anticoagulation  -Once bleeding stops, she will need to start lovenox 40 mg daily (prophylactic dosing) for at least 3 months     Iron deficiency anemia  -Present prior to hematoma but now worse with large hematoma  -IV Ferrlecit ordered     Folic acid deficiency: Folvite 1 mg daily     B12 deficiency: B12 daily         ICU  Critical Care APRN Progress Note     Subjective:  No acute events overnight.  Patient feels better today.     Vitals:  BP 95/66   Pulse 97   Temp 98.4 °F (36.9 °C) (Temporal)   Resp 19   Ht 175.3 cm (5' 9\")   Wt 286 lb 13.1 oz (130.1 kg)   SpO2 100%   Breastfeeding Yes   BMI 42.36 kg/m²                Physical Exam:    General Appearance: Alert, cooperative, mild distress, appears stated age  Neck: No JVD, neck supple, no adenopathy, trachea midline, no carotid bruits  Lungs: Clear to auscultation bilaterally, respirations unlabored  Heart: Regular rate and rhythm, S1 and S2 normal, no murmur, rub or gallop  Abdomen: Soft, non-tender, bowel sounds active all four quadrants, no masses, no organomegaly,  incision c/d/I, epidural site without bruising or  swelling.   Extremities: Extremities normal, atraumatic, no cyanosis or edema,capillary refill <3 sec.    Pulses: 2+ and symmetric all extremities  Skin: Skin color, texture, turgor normal for ethnicity, no rashes or lesions, warm and dry  Neurologic: CNII-XII intact, normal strength      Assessment/Plan:     Hematoma pelvis s/p , mild hydronephrosis  -hold lovenox  -transfuse for Hgb <7  -pain medication   -bowel regimen  -OB consulted  -Consider urology consult if no improvement in hydronephrosis  -vasopressors as needed for SBP <90, not on at this time  -Abdominal binder  -General surgery consulted for rectus sheath hematoma     Hx DVT/PE  -hold lovenox  -hematology following  -repeat RLE US without evidence of DVT, LLE without DVT  -CTA chest negative for PE     Depression/anxiety  -continue home medications     Hypothyroid  -continue home medications     Asthma  -rescue inhaler as needed     F/E/N  -NPO  -replete electrolytes as needed     Proph  -Hold A/C at this time  -SCD     Dispo  -Full code  -ICU for risk of decompensation        Brief Procedure Report     Pre-Operative Diagnosis: PE and pelvic hemoorhage after C section     Post-Operative Diagnosis: Same as above.     Procedure Performed: IVC filter placement      :    ICU  Critical Care APRN Progress Note      Subjective:  No acute events overnight.  Patient with improvement in her pain.       OBJECTIVE  Vitals:  /68   Pulse 116   Temp 99.8 °F (37.7 °C) (Temporal)   Resp 23   Ht 175.3 cm (5' 9\")   Wt 286 lb 13.1 oz (130.1 kg)   SpO2 98%   Breastfeeding Yes   BMI 42.36 kg/m²               Physical Exam:    General Appearance: Alert, cooperative, mild distress, appears stated age  Neck: No JVD, neck supple, no adenopathy, trachea midline, no carotid bruits  Lungs: Clear to auscultation bilaterally, respirations unlabored  Heart: Regular rate and rhythm, S1 and S2 normal, no murmur, rub or gallop  Abdomen: Soft, non-tender, bowel  sounds active all four quadrants, no masses, no organomegaly,  incision c/d/I, epidural site without bruising or swelling.   Extremities: Extremities normal, atraumatic, no cyanosis,capillary refill <3 sec. RLE with some edema   Pulses: 2+ and symmetric all extremities  Skin: Skin color, texture, turgor normal for ethnicity, no rashes or lesions, warm and dry  Neurologic: CNII-XII intact, normal strength     Lab Results   Component Value Date     WBC 9.0 2024     HGB 8.1 2024     HCT 23.2 2024     .0 2024     CREATSERUM 0.36 2024     BUN 7 2024      2024     K 3.6 2024      2024     CO2 26.0 2024      2024     CA 7.7 2024     ALB 2.8 2024     ALKPHO 73 2024     BILT 0.4 2024     TP 4.7 2024     AST 15 2024     ALT 12 2024     MG 1.7 2024       Assessment/Plan:     Hematoma pelvis s/p , mild hydronephrosis  -hold lovenox  -transfuse for Hgb <7  -pain medication   -bowel regimen  -OB following  -Urology on consult   -vasopressors as needed for SBP <90, not on at this time  -Abdominal binder  -General surgery consulted for rectus sheath hematoma     Hx DVT/PE  -hold lovenox  -hematology following  -repeat RLE US without evidence of DVT, LLE without DVT  -CTA chest negative for PE  -IVC filter placed      Depression/anxiety  -continue home medications     Hypothyroid  -continue home medications     Asthma  -rescue inhaler as needed     F/E/N  -CLD  -replete electrolytes as needed     Proph  -Hold A/C at this time  -SCD     Dispo  -Full code  -ICU, consider transfer to floor     AZEB Fermin-BC  Critical Care NP     Agree with A/P as above.  Overall patient received her IVC filter yesterday  As far as bilateral ureteral stent placement we will touch base with urology but preliminarily they are favoring Cantrell catheter placement as his.  Will verify.   Overall hemoglobin stays stable IV fluids should be discontinued so as not to confound blood loss with dilution.  Will space out H&H from our standpoint patient can go to the floor.  Will get up and ambulate today    OBGYN:    A/p:  POD#8 s/p c/s  Placed on therapeutic lovenox due to LE DVT and had intra-abdominal bleeding probably from rectus muscles. Now seems to have stopped. Now she is dealing with symptoms of pain and pressure due to large hematoma. Pain is improving.   Hemodynamically stable s/p 3 units PRBC's  Transfer to med surg and observe for another 24 hours. If still stable tomorrow, then will be able to go home.     HOSPITALIST:    ASSESSMENT / PLAN:   24 year old female with history of anxiety, depression, asthma, hypothyroidism, migraines, sleep apnea presenting for abdominal pain.     Rectus Sheath Hematoma  -recent   -OB/Gyn following  -surgery following  -heme following  -Urology consulted  -repeat ct shows stable size, no active bleeding  -s/p prbc on admission, hgb stable after  -vitals stable after  -holding AC for now  -no surgical intervention needed  -continue supportive care     Anemia  -acute blood loss  -transfused on admission  -hgb stable now, trend  -secondary to above  -hold AC     DVT   -recent  -was on lovenox, stopped due to anemia, hematoma  -IVC filter placed per Heme rec       11/3:    OBGYN:  S: pt without complaints.  Positive flatus. Pain improving  O: VS-Temp:  [97 °F (36.1 °C)-100.4 °F (38 °C)] 97.8 °F (36.6 °C)  Pulse:  [] 102  Resp:  [14-34] 24  BP: (105-128)/(51-81) 116/68  SpO2:  [93 %-98 %] 94 %       Abdomen: soft, ND, NT  Incision- CDI       Extremities: no edema, NT Bilateral lower extremities       CBC:         Lab Results   Component Value Date     WBC 8.1 2024     RBC 2.43 2024     HGB 7.9 2024     HGB 7.9 2024     HCT 23.4 2024     MCV 96.3 2024     MCH 32.5 2024     MCHC 33.8 2024     RDW 14.6  2024     .0 2024      A/P:      1. POD #9 s/p C/S      2. Intra-abdominal hematoma after anticoagulation due to DVT. Stable at present without evidence of ongoing bleeding.       3. Intermittent fevers- spoke with ICU doc. Check blood cultures. Hold off on antibiotics for now. Will start if fevers progress      4. Hb stable      5. Continue inpatient observation for now       ICU  Critical Care APRN Progress Note     Subjective:  No co mplaints this morning. Hgb down-trending, but HD stable and denies pain.      Vitals:  /68 (BP Location: Right arm)   Pulse 102   Temp 97.8 °F (36.6 °C) (Temporal)   Resp 24   Ht 175.3 cm (5' 9\")   Wt 286 lb 13.1 oz (130.1 kg)   SpO2 94%   Breastfeeding Yes   BMI 42.36 kg/m²               Physical Exam:    General Appearance: Alert, cooperative, denies pain  Neck: No JVD  Lungs: Clear to auscultation bilaterally, respirations unlabored  Heart: Regular rate and rhythm, S1 and S2 normal, no murmur, rub or gallop  Abdomen: Obese, Soft, non-tender, bowel sounds active,  incision c/d/I, epidural site without bruising or swelling, abdominal binder intact, no signs of ecchymosis  Extremities: Extremities normal, atraumatic, no cyanosis,capillary refill <3 sec. RLE with mild edema, non-pitting  Pulses: 2+ and symmetric all extremities  Skin: Skin color, texture, turgor normal for ethnicity, no rashes or lesions, warm and dry  Neurologic: CNII-XII intact, normal strength      Assessment/Plan:     Acute blood loss anemia: 2/2 below  Large pelvic and rectal sheath hematoma: S/p recent  on full dose a/c for VTE  -Repeat CTA shows decreased in size of overall hemorrhage initially seen on CT on 10/31, no acative extravasation  -Holding anticoagulation  -transfuse for goal Hgb <7  -Monitor Hgb BID  -pain medication PRN  -Urology on consult given compression on bladder and mild hydro. Plan to remove pritchard today for voiding trial. Repeat imaging as  outpt in 2-3 wks  -Abdominal binder in place  -General surgery   -OB following     Hx DVT/PE, provoked suspected to be related to surgery  -Recent complete DVT in right posterior tibial vein on US 10/25--started on lovenox 1mg/kg BID  -Repeat LE dopplers negative for DVTs  -s/p IVC filter 11/1  -hold lovenox now due to hematoma  -CTA chest negative for PE  -Heme following     Low grade fevers: Unclear etiology. Low suspicion for infectious etiology. May be due to above with multiple transfusions.   -PCT on admit 0.10, repeat  0.13  -Without leukocytosis, Tmax 100.4 last 24 hours  -UA negative on admit  -B. Cxs NGTD, will repeat  -Encourage IS, OOB, ambulate  -Hold on antibiotics at this time given no signs of active infection     Constipation  -Bowel regimen     Asthma  -rescue inhaler as needed     F/E/N  -regular diet  -replete electrolytes as needed     Proph  -Hold A/C at this time  -SCDs for DVT proph     Dispo  -Full code  -stable to transfer to floor     STEPHEN Lakhani-BC  ICU     Agree with assessment and plan as above  Overall patient is doing well however she has had some low-grade fevers.  Workup has been fairly negative CT chest to rule out PE was negative lower extremity Dopplers were negative  Urinalysis nothing significant  Really the patient is improving my suspicion is that the low-grade fevers are coming from coagulation cascade exposure to hematoma which is significant in the pelvis.  Will draw a repeat blood cultures today we will hold off on antibiotic so as not to confound issues but low threshold to start.  Doubt  suspicion for infected hematoma in this early although a possibility as temporally related.  Will remove the Cantrell catheter continue ambulation.  Hemoglobin slight decrease do not believe the patient is actively bleeding we will recheck later but patient will need to stay in the hospital not necessarily the intensive care unit  35 minutes critical care time spent with this patient  with respect to ongoing management of pelvic hematoma date of service 11/3/2024    Scheduled:   Scheduled Medications    magnesium oxide  800 mg Oral Once    folic acid  1 mg Oral Daily    cyanocobalamin  1,000 mcg Oral Daily    docusate sodium  100 mg Oral BID    sennosides  8.6 mg Oral         UROLOGY:    IMPRESSION     1.           Pelvic hematoma  -Causing extrinsic compression on bladder and slight ureteral compression with mild hydro which was improved on the most recent CT  -11/3/24 Hgb 7.9, pain much better                  PLAN     1.           Voiding trial  2.           Expect minimal hydro will resolve with hematoma resolution   3.Can repeat imaging as outpatient in 2-3 weeks          Vitals (last day)         Date/Time Temp Pulse Resp BP SpO2 Weight O2 Device O2 Flow Rate (L/min) [facility]     11/01/24 0900 -- 90 26 100/63 97 % -- -- -- MS     11/01/24 0800 98.1 °F (36.7 °C) 100 20 99/74 98 % -- None (Room air) -- MS     11/01/24 0700 -- 93 22 99/65 98 % -- -- --      11/01/24 0600 -- 97 19 95/66 100 % -- -- --      11/01/24 0530 98.4 °F (36.9 °C) 120 19 100/65 96 % 286 lb 13.1 oz (130.1 kg) -- --      11/01/24 0500 -- 95 20 106/62 94 % -- -- --      11/01/24 0430 -- 96 19 102/66 -- -- -- --      11/01/24 0400 -- 105 19 106/66 93 % -- None (Room air) --      11/01/24 0330 -- 95 19 101/67 -- -- -- --      11/01/24 0308 -- -- -- -- 97 % -- -- --      11/01/24 0300 -- 87 21 92/67 92 % -- -- --      11/01/24 0250 97.6 °F (36.4 °C) 84 21 101/66 98 % -- -- --      11/01/24 0230 -- 100 24 90/56 99 % -- -- --      11/01/24 0200 97.9 °F (36.6 °C) 105 21 103/67 93 % -- -- --      11/01/24 0130 -- 102 19 92/67 96 % -- -- --      11/01/24 0100 97.9 °F (36.6 °C) 103 19 91/61 97 % -- -- --      11/01/24 0030 -- 116 21 100/62 97 % -- -- --      11/01/24 0021 97.9 °F (36.6 °C) 110 15 101/82 97 % -- -- --      11/01/24 0015 -- 119 21 101/82 96 % -- -- --      11/01/24 0000 98.4 °F (36.9 °C)  104 21 86/59 96 % -- None (Room air) -- KK     Blood Transfusion Record         Product Unit Status Volume Start End                    Transfuse RBC         24  [id number]  [id number] Completed 11/01/24 0252 354 mL 11/01/24 0006 11/01/24 0250         24  [id number]  [id number] Completed 10/31/24 1445 400 mL 10/31/24 1400 10/31/24 1430       [address] [place] [address]  [id number]  [id number] Completed 10/31/24 1356 333 mL 10/31/24 1330 10/31/24 1356

## 2024-11-04 NOTE — PROGRESS NOTES
[facility] Hospitalist Progress Note                                                                     [facility]   part of [facility]      Carmela Bell  7/30/2000    SUBJECTIVE: no chest pain, palpitation, shortness of breath, cough, nausea, vomiting, abdominal pain.     OBJECTIVE:  Temp:  [98.3 °F (36.8 °C)-99.9 °F (37.7 °C)] 98.5 °F (36.9 °C)  Pulse:  [] 78  Resp:  [16-24] 16  BP: (109-127)/(58-73) 109/58  SpO2:  [96 %-100 %] 96 %  Exam  Gen: No acute distress, alert and oriented   Pulm: Lungs clear bilaterally, normal respiratory effort, no crackles, no wheezing  CV: Heart with regular rate and rhythm, no murmur.   Abd: Abdomen soft, nontender, nondistended, bowel sounds present  MSK: No significant pitting edema or tenderness of the LE  Skin: no new rashes or lesions    Labs:   Recent Labs   Lab 10/31/24  0932 10/31/24  1252 10/31/24  2239 11/01/24  0411 11/01/24  1339 11/02/24  0453 11/02/24  0812 11/02/24  1546 11/03/24  0444 11/03/24  1505 11/04/24  0446   WBC 12.9*   < > 11.9* 11.0  --  9.0  --   --  8.1  --  8.2   HGB 9.7*   < > 8.6* 9.1*   < > 8.1* 8.0* 9.1* 7.9*  7.9* 8.4* 8.1*  8.1*   .0   < > 96.5 95.4  --  94.7  --   --  96.3  --  97.6   .0   < > 154.0 142.0*  --  145.0*  --   --  163.0  --  202.0   INR 0.96  --   --  1.00  --   --   --   --   --   --   --     < > = values in this interval not displayed.       Recent Labs   Lab 10/31/24  0932 11/01/24  0411 11/02/24  0453 11/03/24  0444 11/04/24  0446   * 135* 138 138 138   K 3.4* 4.1  4.1 3.6 3.6 3.6    108 108 106 107   CO2 26.0 24.0 26.0 27.0 27.0   BUN 12 10 7* 6* 6*   CREATSERUM 0.61 0.43* 0.36* 0.31* 0.30*   CA 9.0 8.1* 7.7* 8.1* 8.2*   MG  --  1.5* 1.7 1.5* 1.6   PHOS  --  3.1  --   --   --    * 111* 114* 105* 100*       Recent Labs   Lab 10/31/24  0932 11/01/24  0411 11/02/24  0453 11/03/24 0444 11/04/24 0446   ALT 22 15 12 10 9*   AST 22 15  15 12 12   ALB 3.3 2.6* 2.8* 2.9* 3.0*       No results for input(s): \"PGLU\" in the last 168 hours.    Meds:   Scheduled:    folic acid  1 mg Oral Daily    cyanocobalamin  1,000 mcg Oral Daily    docusate sodium  100 mg Oral BID    sennosides  8.6 mg Oral BID     Continuous Infusions:   PRN:   acetaminophen **OR** HYDROcodone-acetaminophen **OR** HYDROcodone-acetaminophen    polyethylene glycol (PEG 3350)    sennosides    bisacodyl    fleet enema    ASSESSMENT / PLAN:   24 year old female with history of anxiety, depression, asthma, hypothyroidism, migraines, sleep apnea presenting for abdominal pain.     Rectus Sheath Hematoma  -recent   -OB/Gyn following  -surgery following--> no surgical intervention   -heme following--> outpt follow up  -Urology following --> outpt follow up, passed voiding trail, will need outpt US in 2 weeks  -repeat ct shows stable size, no active bleeding  -s/p prbc on admission, hgb stable after  -vitals stable after  -holding AC for now  -continue supportive care     Anemia--> stable  -acute blood loss  -transfused on admission  -secondary to above  -hold AC     DVT   -recent  -was on lovenox, stopped due to anemia, hematoma  -IVC filter placed per Heme rec     Quality:  DVT Prophylaxis: scd  CODE status: Full Code  Cantrell: none     Plan of care discussed with patient and staff     Dispo: medically stable for dc if ok with others, rec outpt hgb within 1 week.      Doug Castaneda MD  [facility]  [phone]

## 2024-11-04 NOTE — PROGRESS NOTES
Pt admitted to unit in stable condition. Up to bathroom with standby assist and walker. Pt voiding. Tele monitor placed. Afebrile. Skin check performed with TEJA Arellano. C section incision with skin glue well approximated. Bruising to abdomen from lovenox injections. Plan of care reviewed with pt. All questions addressed.

## 2024-11-04 NOTE — PROGRESS NOTES
[facility]  Urology Progress Note    Carmela Bell Patient Status:  Inpatient    2000 MRN [id number]   Location [facility] 3NW-A Attending Kash Colin MD   Hosp Day # 4 PCP ILENE MOODY     Subjective:  Carmela Bell is a(n) 24 year old female    Current complaints: Some abdominal cramping.  Voiding ok. No dysuria or gross hematuria.      Objective:  General appearance: alert, appears stated age, and cooperative  Blood pressure 115/72, pulse 92, temperature 98.8 °F (37.1 °C), temperature source Oral, resp. rate 18, height 5' 9\" (1.753 m), weight 286 lb 13.1 oz (130.1 kg), SpO2 98%, currently breastfeeding.  Lungs: non-labored respirations  Abdomen: soft  Lab Results   Component Value Date    WBC 8.2 2024    HGB 8.1 2024    HGB 8.1 2024    HCT 24.8 2024    .0 2024    CREATSERUM 0.30 2024    BUN 6 2024     2024    K 3.6 2024     2024    CO2 27.0 2024     2024    CA 8.2 2024    ALB 3.0 2024    ALKPHO 77 2024    BILT 0.5 2024    TP 5.1 2024    AST 12 2024    ALT 9 2024    MG 1.6 2024       Hospital Encounter on 10/31/24   1. Urine Culture, Routine     Status: None    Collection Time: 10/31/24  4:09 PM    Specimen: Urine, clean catch   Result Value Ref Range    Urine Culture No Growth at 18-24 hrs. N/A   2. Blood Culture     Status: None (Preliminary result)    Collection Time: 10/31/24  9:49 AM    Specimen: Blood,peripheral   Result Value Ref Range    Blood Culture Result No Growth 3 Days N/A           Assessment:     Pelvic hematoma  -Causing extrinsic compression on bladder and slight ureteral compression with mild hydro which was improved on the most recent CT  -11/3/24 Hgb 7.9, pain much better  -Hgb 24: 8.1  -Serum creat 24: 0.3  Urine culture 10/31/24: no growth    Plan:    Follow voiding status  Patient to follow-up with  urology in 2 weeks with renal US (unless CT scan planned) - TE sent through [facility] system to help coordinate appt.      Will follow peripherally.    Above discussed with patient.    SOFIA Pandya [facility]  Department of Urology  11/4/2024  5:35 AM

## 2024-11-04 NOTE — PROGRESS NOTES
[facility] Progress Note   Length of Stay: 4    Subjective:   -Pelvic pain is better  -Has more LLE swelling but not bothersome     Hematology History  -She [facility]. She has history of a provoked PE/SVT suspected to be related to surgery.      -She underwent an abdominoplasty 2023. She was diagnosed on 23 with a VTE. Doppler showed a LLE superficial thrombus in the greater saphenous vein. She was noted to have large buren PE with R heart strain. She was started on eliquis but switched to therapeutic lovenox when she found out she was pregnant. She also received IV Venofer with pregnancy. During her pregnancy she was on Lovenox 80 mg BID. She stopped lovenox around 10/23/24.      -10/25/24: admitted for a . Noticed RLE swelling on 10/26/24 --> RLE doppler showed a complete DVT in the right posterior tibial vein. She is breast feeding. She was started on lovenox 1 mg/kg BID with instruction to follow up in 1 week.      -She presented on 10/31/24 with abdominal pain. Her Hb was down to 7.6. CT showed a large pelvic hematoma with mass effect on bladder and uterus and rectus sheath hematoma. CTA without active extravasation. Lovenox was held and she was given 2 units of blood and protamine. Bilateral LE dopplers negative. She had an IVC filter place given concern that the hematoma was compressing her femoral vein.     ROS: 12 Point ROS completed and pertinent positives are above     Objective:    cyanocobalamin  1,000 mcg Oral Daily    folic acid  1 mg Oral Daily    iron polysacch [id number]  1 capsule Oral Daily    prenatal vitamin with DHA  1 capsule Oral Daily    docusate sodium  100 mg Oral BID    sennosides  8.6 mg Oral BID       acetaminophen **OR** HYDROcodone-acetaminophen **OR** HYDROcodone-acetaminophen    polyethylene glycol (PEG 3350)    sennosides    bisacodyl    fleet enema    Physical Exam  Vitals:    24 0831   BP: 109/58   Pulse: 78    Resp: 16   Temp: 98.5 °F (36.9 °C)     General: NAD, AOX3  HEENT: clear op, mmm, no jvd, no scleral icterus   CV: RRR S1S2 no murmurs, bilateral LE swelling, mildly worse on L  Lungs: CTAB, no increased work of breathing  Abd: soft nt nd +BS no hepatosplenomegaly  Neuro: CN: II-XII grossly intact    Labs/Imaging/Path:  Lab Results   Component Value Date    WBC 8.2 2024    HGB 8.1 (L) 2024    HGB 8.1 (L) 2024    HCT 24.8 (L) 2024    MCV 97.6 2024    .0 2024       Recent Labs   Lab 24  0453 24  0444 24  0446   * 105* 100*   BUN 7* 6* 6*   CREATSERUM 0.36* 0.31* 0.30*   CA 7.7* 8.1* 8.2*   ALB 2.8* 2.9* 3.0*    138 138   K 3.6 3.6 3.6    106 107   CO2 26.0 27.0 27.0   ALKPHO 73 72 77   AST 15 12 12   ALT 12 10 9*   BILT 0.4 0.4 0.5   TP 4.7* 4.9* 5.1*       Imaging: Reviewed     Assessment and Plan:   Pelvic Hematoma. Rectus Sheath Hematoma  C section on 10/25/24  -related to recent  and anticoagulation. Hold Lovenox  -ICU/OB managing      Large R sided PE: dx 23 at [facility]: initially on Eliquis but switched to Lovenox during pregnancy   Lovenox 80 mg/BID (intermediate dosing) stopped 10/23/24 for C section on 10/25/24  Developed RLE DVT on 10/26/24 and started on Lovenox 1 mg/kg BID  -Bilateral dopplers on 10/31/24 negative  -Agree with holding anticoagulation  -Once bleeding stops, she will need to start lovenox 40 mg daily (prophylactic dosing) for at least 3 months  -Temporary IVC filter placed 24 due to possible vein compression  -Repeat dopplers today given worse L sided swelling     Iron deficiency anemia  -Present prior to hematoma but now worse with large hematoma  -IV Ferrlecit given     Folic acid deficiency: Folvite 1 mg daily     B12 deficiency: B12 daily     EMBER Fernández MD  [facility]

## 2024-11-04 NOTE — PROGRESS NOTES
A&Ox4. RA. .  Telemetry: ST  GI: Abdomen soft, rounded. Some bruising on her abdomen. Passing gas.  Denies nausea.  : Voids.  Pain controlled with PRN pain medications  Up with standby assist and a walker.  Incisions: c section scar closed with skin glue.   Diet: regular diet     All appropriate safety measures in place. All questions and concerns addressed.

## 2024-11-04 NOTE — DISCHARGE INSTRUCTIONS
**THIS IS NOT A REFERRAL**    Unless your physician has indicated otherwise, please call [phone] to schedule YOUR RENAL ULTRASOUND at one of the following [facility] locations.    [place]: [address], [address]  [place]: [address]   [place]: [address]  [place]: [address]   [place]: [address] [facility]: [address]  [place]: [address] [facility] [facility] [address]  [place]: [address]   [place]: [address]   [place]: [address]  [place]: [address] [place] [address]: [address] [facility]: [address]    Important note regarding exams that require a referral/authorization: As a service to you, [facility] will obtain any necessary prior authorization required by your benefit plan provided you choose to have your test performed at one of the locations listed above. To initiate the precertification process, please call our scheduling department at [phone] to secure an appointment time with one of our facilities. At that time, our department will begin to work on any necessary authorizations that may be needed for your exam. Please note that the approval process could take 3-5 days. Failure to obtain required authorization numbers may create reimbursement difficulties for you.     If you choose to have this test performed at a location other than those listed above, you are responsible for obtaining any prior authorization required by your benefit plan.    [facility]    Obtain a repeat hemoglobin at your jazmin physician visit to monitor for stability. No longer then 10 days from now

## 2024-11-05 NOTE — PAYOR COMM NOTE
--------------  DISCHARGE REVIEW    Payor: MARIANELA LEARY  Subscriber #:  [id number]  Authorization Number: [id number]    Admit date: 10/31/24  Admit time:   2:55 PM  Discharge Date: 11/4/2024  3:03 PM          Discharge Summary Notes    No notes of this type exist for this encounter.        Continue home Ipratropium nebulizer

## 2024-11-07 PROBLEM — G89.18 POST-OP PAIN: Status: ACTIVE | Noted: 2024-11-07

## 2024-11-08 ENCOUNTER — APPOINTMENT (OUTPATIENT)
Dept: ULTRASOUND IMAGING | Age: 24
End: 2024-11-08
Attending: EMERGENCY MEDICINE
Payer: COMMERCIAL

## 2024-11-08 ENCOUNTER — HOSPITAL ENCOUNTER (EMERGENCY)
Age: 24
Discharge: HOME | End: 2024-11-08
Attending: EMERGENCY MEDICINE
Payer: COMMERCIAL

## 2024-11-08 VITALS
DIASTOLIC BLOOD PRESSURE: 85 MMHG | BODY MASS INDEX: 41.77 KG/M2 | WEIGHT: 282 LBS | HEIGHT: 69 IN | OXYGEN SATURATION: 99 % | SYSTOLIC BLOOD PRESSURE: 116 MMHG | HEART RATE: 100 BPM | RESPIRATION RATE: 16 BRPM | TEMPERATURE: 98 F

## 2024-11-08 DIAGNOSIS — R60.0 PERIPHERAL EDEMA: Primary | ICD-10-CM

## 2024-11-08 LAB
ALBUMIN SERPL-MCNC: 2.2 G/DL (ref 3.4–5)
ALBUMIN/GLOB SERPL: 0.5 {RATIO} (ref 1–2)
ALP LIVER SERPL-CCNC: 146 U/L
ALT SERPL-CCNC: 20 U/L
ANION GAP SERPL CALC-SCNC: 4 MMOL/L (ref 0–18)
AST SERPL-CCNC: 25 U/L (ref 15–37)
BASOPHILS # BLD AUTO: 0.04 X10(3) UL (ref 0–0.2)
BASOPHILS NFR BLD AUTO: 0.4 %
BILIRUB SERPL-MCNC: 0.7 MG/DL (ref 0.1–2)
BUN BLD-MCNC: 7 MG/DL (ref 9–23)
CALCIUM BLD-MCNC: 8.7 MG/DL (ref 8.5–10.1)
CHLORIDE SERPL-SCNC: 103 MMOL/L (ref 98–112)
CO2 SERPL-SCNC: 30 MMOL/L (ref 21–32)
CREAT BLD-MCNC: 0.42 MG/DL
EGFRCR SERPLBLD CKD-EPI 2021: 138 ML/MIN/1.73M2 (ref 60–?)
EOSINOPHIL # BLD AUTO: 0.17 X10(3) UL (ref 0–0.7)
EOSINOPHIL NFR BLD AUTO: 1.8 %
ERYTHROCYTE [DISTWIDTH] IN BLOOD BY AUTOMATED COUNT: 14.1 %
GLOBULIN PLAS-MCNC: 4.3 G/DL (ref 2.8–4.4)
GLUCOSE BLD-MCNC: 92 MG/DL (ref 70–99)
HCT VFR BLD AUTO: 30.3 %
HGB BLD-MCNC: 9.8 G/DL
IMM GRANULOCYTES # BLD AUTO: 0.15 X10(3) UL (ref 0–1)
IMM GRANULOCYTES NFR BLD: 1.6 %
LYMPHOCYTES # BLD AUTO: 1.62 X10(3) UL (ref 1–4)
LYMPHOCYTES NFR BLD AUTO: 17.5 %
MCH RBC QN AUTO: 32.3 PG (ref 26–34)
MCHC RBC AUTO-ENTMCNC: 32.3 G/DL (ref 31–37)
MCV RBC AUTO: 100 FL
MONOCYTES # BLD AUTO: 0.86 X10(3) UL (ref 0.1–1)
MONOCYTES NFR BLD AUTO: 9.3 %
NEUTROPHILS # BLD AUTO: 6.44 X10 (3) UL (ref 1.5–7.7)
NEUTROPHILS # BLD AUTO: 6.44 X10(3) UL (ref 1.5–7.7)
NEUTROPHILS NFR BLD AUTO: 69.4 %
OSMOLALITY SERPL CALC.SUM OF ELEC: 282 MOSM/KG (ref 275–295)
PLATELET # BLD AUTO: 446 10(3)UL (ref 150–450)
POTASSIUM SERPL-SCNC: 3.4 MMOL/L (ref 3.5–5.1)
PROT SERPL-MCNC: 6.5 G/DL (ref 6.4–8.2)
RBC # BLD AUTO: 3.03 X10(6)UL
SODIUM SERPL-SCNC: 137 MMOL/L (ref 136–145)
TROPONIN I SERPL HS-MCNC: 8 NG/L
WBC # BLD AUTO: 9.3 X10(3) UL (ref 4–11)

## 2024-11-08 PROCEDURE — 99284 EMERGENCY DEPT VISIT MOD MDM: CPT

## 2024-11-08 PROCEDURE — 36415 COLL VENOUS BLD VENIPUNCTURE: CPT

## 2024-11-08 PROCEDURE — 93970 EXTREMITY STUDY: CPT | Performed by: EMERGENCY MEDICINE

## 2024-11-08 PROCEDURE — 84484 ASSAY OF TROPONIN QUANT: CPT | Performed by: EMERGENCY MEDICINE

## 2024-11-08 PROCEDURE — 93005 ELECTROCARDIOGRAM TRACING: CPT

## 2024-11-08 PROCEDURE — 80053 COMPREHEN METABOLIC PANEL: CPT | Performed by: EMERGENCY MEDICINE

## 2024-11-08 PROCEDURE — 93010 ELECTROCARDIOGRAM REPORT: CPT

## 2024-11-08 PROCEDURE — 85025 COMPLETE CBC W/AUTO DIFF WBC: CPT | Performed by: EMERGENCY MEDICINE

## 2024-11-08 RX ORDER — GABAPENTIN 300 MG/1
300 CAPSULE ORAL 3 TIMES DAILY
COMMUNITY

## 2024-11-09 LAB
ATRIAL RATE: 91 BPM
P AXIS: -7 DEGREES
P-R INTERVAL: 170 MS
Q-T INTERVAL: 334 MS
QRS DURATION: 76 MS
QTC CALCULATION (BEZET): 410 MS
R AXIS: -6 DEGREES
T AXIS: -29 DEGREES
VENTRICULAR RATE: 91 BPM

## 2024-11-09 NOTE — ED INITIAL ASSESSMENT (HPI)
Bilateral leg swelling- discharged from [facility] 11/4. Noticed legs have gotten even more swollen. Denies SOB.

## 2024-11-09 NOTE — ED INITIAL ASSESSMENT (HPI)
Bilat leg swelling since 10/31 that has gotten worse.  Denies sob.  Was discharged on Monday.  States recent blood clot in right leg.

## 2024-11-09 NOTE — ED QUICK NOTES
Rounding Completed    Plan of Care reviewed. Waiting for us.  Elimination needs assessed.  Provided support.    Bed is locked and in lowest position. Call light within reach.
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
## 2024-11-18 ENCOUNTER — APPOINTMENT (OUTPATIENT)
Dept: CT IMAGING | Facility: HOSPITAL | Age: 24
End: 2024-11-18
Attending: EMERGENCY MEDICINE
Payer: COMMERCIAL

## 2024-11-18 ENCOUNTER — HOSPITAL ENCOUNTER (EMERGENCY)
Facility: HOSPITAL | Age: 24
Discharge: HOME | End: 2024-11-18
Attending: EMERGENCY MEDICINE
Payer: COMMERCIAL

## 2024-11-18 ENCOUNTER — APPOINTMENT (OUTPATIENT)
Dept: MRI IMAGING | Facility: HOSPITAL | Age: 24
End: 2024-11-18
Attending: EMERGENCY MEDICINE
Payer: COMMERCIAL

## 2024-11-18 ENCOUNTER — APPOINTMENT (OUTPATIENT)
Dept: GENERAL RADIOLOGY | Facility: HOSPITAL | Age: 24
End: 2024-11-18
Payer: COMMERCIAL

## 2024-11-18 VITALS
SYSTOLIC BLOOD PRESSURE: 110 MMHG | TEMPERATURE: 99 F | DIASTOLIC BLOOD PRESSURE: 78 MMHG | HEART RATE: 101 BPM | BODY MASS INDEX: 40 KG/M2 | WEIGHT: 270 LBS | OXYGEN SATURATION: 97 % | RESPIRATION RATE: 25 BRPM

## 2024-11-18 DIAGNOSIS — R60.0 LOWER EXTREMITY EDEMA: Primary | ICD-10-CM

## 2024-11-18 DIAGNOSIS — R53.1 WEAKNESS GENERALIZED: ICD-10-CM

## 2024-11-18 LAB
ALBUMIN SERPL-MCNC: 3.9 G/DL (ref 3.2–4.8)
ALBUMIN/GLOB SERPL: 1.3 {RATIO} (ref 1–2)
ALP LIVER SERPL-CCNC: 110 U/L
ALT SERPL-CCNC: 7 U/L
ANION GAP SERPL CALC-SCNC: 3 MMOL/L (ref 0–18)
ANTIBODY SCREEN: NEGATIVE
APTT PPP: 28.5 SECONDS (ref 23–36)
AST SERPL-CCNC: 17 U/L (ref ?–34)
BASOPHILS # BLD AUTO: 0.03 X10(3) UL (ref 0–0.2)
BASOPHILS NFR BLD AUTO: 0.3 %
BILIRUB SERPL-MCNC: 0.8 MG/DL (ref 0.3–1.2)
BILIRUB UR QL STRIP.AUTO: NEGATIVE
BUN BLD-MCNC: 9 MG/DL (ref 9–23)
CALCIUM BLD-MCNC: 9.1 MG/DL (ref 8.7–10.4)
CHLORIDE SERPL-SCNC: 102 MMOL/L (ref 98–112)
CLARITY UR REFRACT.AUTO: CLEAR
CO2 SERPL-SCNC: 31 MMOL/L (ref 21–32)
COLOR UR AUTO: YELLOW
CREAT BLD-MCNC: 0.79 MG/DL
EGFRCR SERPLBLD CKD-EPI 2021: 106 ML/MIN/1.73M2 (ref 60–?)
EOSINOPHIL # BLD AUTO: 0.17 X10(3) UL (ref 0–0.7)
EOSINOPHIL NFR BLD AUTO: 1.7 %
ERYTHROCYTE [DISTWIDTH] IN BLOOD BY AUTOMATED COUNT: 14.6 %
GLOBULIN PLAS-MCNC: 3.1 G/DL (ref 2–3.5)
GLUCOSE BLD-MCNC: 107 MG/DL (ref 70–99)
GLUCOSE UR STRIP.AUTO-MCNC: NORMAL MG/DL
HCT VFR BLD AUTO: 32.8 %
HGB BLD-MCNC: 10.4 G/DL
IMM GRANULOCYTES # BLD AUTO: 0.05 X10(3) UL (ref 0–1)
IMM GRANULOCYTES NFR BLD: 0.5 %
INR BLD: 1.07 (ref 0.8–1.2)
KETONES UR STRIP.AUTO-MCNC: NEGATIVE MG/DL
LEUKOCYTE ESTERASE UR QL STRIP.AUTO: NEGATIVE
LYMPHOCYTES # BLD AUTO: 0.94 X10(3) UL (ref 1–4)
LYMPHOCYTES NFR BLD AUTO: 9.5 %
MCH RBC QN AUTO: 31.4 PG (ref 26–34)
MCHC RBC AUTO-ENTMCNC: 31.7 G/DL (ref 31–37)
MCV RBC AUTO: 99.1 FL
MONOCYTES # BLD AUTO: 0.95 X10(3) UL (ref 0.1–1)
MONOCYTES NFR BLD AUTO: 9.6 %
NEUTROPHILS # BLD AUTO: 7.71 X10 (3) UL (ref 1.5–7.7)
NEUTROPHILS # BLD AUTO: 7.71 X10(3) UL (ref 1.5–7.7)
NEUTROPHILS NFR BLD AUTO: 78.4 %
NITRITE UR QL STRIP.AUTO: NEGATIVE
OSMOLALITY SERPL CALC.SUM OF ELEC: 281 MOSM/KG (ref 275–295)
PH UR STRIP.AUTO: 6 [PH] (ref 5–8)
PLATELET # BLD AUTO: 237 10(3)UL (ref 150–450)
POTASSIUM SERPL-SCNC: 3.1 MMOL/L (ref 3.5–5.1)
PROT SERPL-MCNC: 7 G/DL (ref 5.7–8.2)
PROT UR STRIP.AUTO-MCNC: 20 MG/DL
PROTHROMBIN TIME: 14.1 SECONDS (ref 11.6–14.8)
RBC # BLD AUTO: 3.31 X10(6)UL
RBC UR QL AUTO: NEGATIVE
RH BLOOD TYPE: POSITIVE
SODIUM SERPL-SCNC: 136 MMOL/L (ref 136–145)
SP GR UR STRIP.AUTO: 1.02 (ref 1–1.03)
UROBILINOGEN UR STRIP.AUTO-MCNC: 3 MG/DL
WBC # BLD AUTO: 9.9 X10(3) UL (ref 4–11)

## 2024-11-18 PROCEDURE — 86901 BLOOD TYPING SEROLOGIC RH(D): CPT | Performed by: EMERGENCY MEDICINE

## 2024-11-18 PROCEDURE — 86900 BLOOD TYPING SEROLOGIC ABO: CPT

## 2024-11-18 PROCEDURE — 93005 ELECTROCARDIOGRAM TRACING: CPT

## 2024-11-18 PROCEDURE — 93010 ELECTROCARDIOGRAM REPORT: CPT

## 2024-11-18 PROCEDURE — 99285 EMERGENCY DEPT VISIT HI MDM: CPT

## 2024-11-18 PROCEDURE — 86850 RBC ANTIBODY SCREEN: CPT

## 2024-11-18 PROCEDURE — 74177 CT ABD & PELVIS W/CONTRAST: CPT | Performed by: EMERGENCY MEDICINE

## 2024-11-18 PROCEDURE — 85610 PROTHROMBIN TIME: CPT | Performed by: EMERGENCY MEDICINE

## 2024-11-18 PROCEDURE — 85730 THROMBOPLASTIN TIME PARTIAL: CPT | Performed by: EMERGENCY MEDICINE

## 2024-11-18 PROCEDURE — 96375 TX/PRO/DX INJ NEW DRUG ADDON: CPT

## 2024-11-18 PROCEDURE — A9575 INJ GADOTERATE MEGLUMI 0.1ML: HCPCS | Performed by: EMERGENCY MEDICINE

## 2024-11-18 PROCEDURE — 96374 THER/PROPH/DIAG INJ IV PUSH: CPT

## 2024-11-18 PROCEDURE — 86900 BLOOD TYPING SEROLOGIC ABO: CPT | Performed by: EMERGENCY MEDICINE

## 2024-11-18 PROCEDURE — 86901 BLOOD TYPING SEROLOGIC RH(D): CPT

## 2024-11-18 PROCEDURE — 85025 COMPLETE CBC W/AUTO DIFF WBC: CPT

## 2024-11-18 PROCEDURE — 71045 X-RAY EXAM CHEST 1 VIEW: CPT

## 2024-11-18 PROCEDURE — 72158 MRI LUMBAR SPINE W/O & W/DYE: CPT | Performed by: EMERGENCY MEDICINE

## 2024-11-18 PROCEDURE — 81001 URINALYSIS AUTO W/SCOPE: CPT | Performed by: EMERGENCY MEDICINE

## 2024-11-18 PROCEDURE — 86850 RBC ANTIBODY SCREEN: CPT | Performed by: EMERGENCY MEDICINE

## 2024-11-18 PROCEDURE — 85025 COMPLETE CBC W/AUTO DIFF WBC: CPT | Performed by: EMERGENCY MEDICINE

## 2024-11-18 PROCEDURE — 80053 COMPREHEN METABOLIC PANEL: CPT | Performed by: EMERGENCY MEDICINE

## 2024-11-18 PROCEDURE — 80053 COMPREHEN METABOLIC PANEL: CPT

## 2024-11-18 RX ORDER — MORPHINE SULFATE 4 MG/ML
4 INJECTION, SOLUTION INTRAMUSCULAR; INTRAVENOUS ONCE
Status: COMPLETED | OUTPATIENT
Start: 2024-11-18 | End: 2024-11-18

## 2024-11-18 RX ORDER — GADOTERATE MEGLUMINE 376.9 MG/ML
20 INJECTION INTRAVENOUS
Status: COMPLETED | OUTPATIENT
Start: 2024-11-18 | End: 2024-11-18

## 2024-11-18 RX ORDER — ONDANSETRON 2 MG/ML
4 INJECTION INTRAMUSCULAR; INTRAVENOUS ONCE
Status: DISCONTINUED | OUTPATIENT
Start: 2024-11-18 | End: 2024-11-18

## 2024-11-18 RX ORDER — ONDANSETRON 2 MG/ML
4 INJECTION INTRAMUSCULAR; INTRAVENOUS ONCE
Status: COMPLETED | OUTPATIENT
Start: 2024-11-18 | End: 2024-11-18

## 2024-11-18 RX ORDER — POTASSIUM CHLORIDE 1500 MG/1
40 TABLET, EXTENDED RELEASE ORAL ONCE
Status: COMPLETED | OUTPATIENT
Start: 2024-11-18 | End: 2024-11-18
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
## 2024-11-18 NOTE — ED INITIAL ASSESSMENT (HPI)
Spoke with Huong LEZAMA OB charge. Patient does not need to go upstairs to L&D. Patient endorses lowe hgb, right leg swelling, numbness and tenderness. She stated she was discharged from ICU on 11/4 diagnosed with clot in right leg. No thinners.Delivered 10/25. She stated she had a filter placed for the clot. Patient stated she hasn't been able to walk since the delivery. Denies chest pain. + ADLER.

## 2024-11-19 ENCOUNTER — HOSPITAL ENCOUNTER (INPATIENT)
Facility: HOSPITAL | Age: 24
LOS: 1 days | Discharge: ANOTHER HOSPITAL | End: 2024-11-20
Attending: EMERGENCY MEDICINE | Admitting: HOSPITALIST
Payer: COMMERCIAL

## 2024-11-19 ENCOUNTER — APPOINTMENT (OUTPATIENT)
Dept: ULTRASOUND IMAGING | Facility: HOSPITAL | Age: 24
End: 2024-11-19
Payer: COMMERCIAL

## 2024-11-19 ENCOUNTER — APPOINTMENT (OUTPATIENT)
Dept: GENERAL RADIOLOGY | Facility: HOSPITAL | Age: 24
End: 2024-11-19
Attending: EMERGENCY MEDICINE
Payer: COMMERCIAL

## 2024-11-19 ENCOUNTER — TELEPHONE (OUTPATIENT)
Dept: CASE MANAGEMENT | Facility: HOSPITAL | Age: 24
End: 2024-11-19

## 2024-11-19 DIAGNOSIS — A41.9 SEPSIS DUE TO UNDETERMINED ORGANISM (HCC): Primary | ICD-10-CM

## 2024-11-19 PROBLEM — I82.4Z3 ACUTE DEEP VEIN THROMBOSIS (DVT) OF DISTAL VEIN OF BOTH LOWER EXTREMITIES (HCC): Status: ACTIVE | Noted: 2024-11-19

## 2024-11-19 LAB
ALBUMIN SERPL-MCNC: 3.5 G/DL (ref 3.2–4.8)
ALBUMIN/GLOB SERPL: 1.3 {RATIO} (ref 1–2)
ALP LIVER SERPL-CCNC: 101 U/L
ALT SERPL-CCNC: <7 U/L
ANION GAP SERPL CALC-SCNC: 5 MMOL/L (ref 0–18)
ANION GAP SERPL CALC-SCNC: 7 MMOL/L (ref 0–18)
ANTIBODY SCREEN: NEGATIVE
APTT PPP: 28.4 SECONDS (ref 23–36)
AST SERPL-CCNC: 19 U/L (ref ?–34)
ATRIAL RATE: 111 BPM
BASOPHILS # BLD AUTO: 0.03 X10(3) UL (ref 0–0.2)
BASOPHILS NFR BLD AUTO: 0.2 %
BILIRUB SERPL-MCNC: 1.4 MG/DL (ref 0.3–1.2)
BILIRUB UR QL CFM: NEGATIVE
BUN BLD-MCNC: 11 MG/DL (ref 9–23)
BUN BLD-MCNC: 12 MG/DL (ref 9–23)
CALCIUM BLD-MCNC: 8.3 MG/DL (ref 8.7–10.4)
CALCIUM BLD-MCNC: 8.7 MG/DL (ref 8.7–10.4)
CHLORIDE SERPL-SCNC: 100 MMOL/L (ref 98–112)
CHLORIDE SERPL-SCNC: 104 MMOL/L (ref 98–112)
CLARITY UR REFRACT.AUTO: CLEAR
CO2 SERPL-SCNC: 28 MMOL/L (ref 21–32)
CO2 SERPL-SCNC: 28 MMOL/L (ref 21–32)
COLOR UR AUTO: YELLOW
CREAT BLD-MCNC: 0.63 MG/DL
CREAT BLD-MCNC: 0.83 MG/DL
CRP SERPL-MCNC: 16.9 MG/DL (ref ?–0.5)
EGFRCR SERPLBLD CKD-EPI 2021: 100 ML/MIN/1.73M2 (ref 60–?)
EGFRCR SERPLBLD CKD-EPI 2021: 125 ML/MIN/1.73M2 (ref 60–?)
EOSINOPHIL # BLD AUTO: 0.01 X10(3) UL (ref 0–0.7)
EOSINOPHIL NFR BLD AUTO: 0.1 %
ERYTHROCYTE [DISTWIDTH] IN BLOOD BY AUTOMATED COUNT: 14.3 %
ERYTHROCYTE [DISTWIDTH] IN BLOOD BY AUTOMATED COUNT: 14.5 %
ERYTHROCYTE [SEDIMENTATION RATE] IN BLOOD: 101 MM/HR
GLOBULIN PLAS-MCNC: 2.8 G/DL (ref 2–3.5)
GLUCOSE BLD-MCNC: 114 MG/DL (ref 70–99)
GLUCOSE BLD-MCNC: 116 MG/DL (ref 70–99)
GLUCOSE UR STRIP.AUTO-MCNC: NEGATIVE MG/DL
HCT VFR BLD AUTO: 25.8 %
HCT VFR BLD AUTO: 28.7 %
HGB BLD-MCNC: 8.6 G/DL
HGB BLD-MCNC: 9.5 G/DL
IMM GRANULOCYTES # BLD AUTO: 0.14 X10(3) UL (ref 0–1)
IMM GRANULOCYTES NFR BLD: 0.7 %
KETONES UR STRIP.AUTO-MCNC: NEGATIVE MG/DL
LACTATE SERPL-SCNC: 1.4 MMOL/L (ref 0.5–2)
LEUKOCYTE ESTERASE UR QL STRIP.AUTO: NEGATIVE
LYMPHOCYTES # BLD AUTO: 1.6 X10(3) UL (ref 1–4)
LYMPHOCYTES NFR BLD AUTO: 8.5 %
MCH RBC QN AUTO: 32 PG (ref 26–34)
MCH RBC QN AUTO: 32.1 PG (ref 26–34)
MCHC RBC AUTO-ENTMCNC: 33.1 G/DL (ref 31–37)
MCHC RBC AUTO-ENTMCNC: 33.3 G/DL (ref 31–37)
MCV RBC AUTO: 96.3 FL
MCV RBC AUTO: 96.6 FL
MONOCYTES # BLD AUTO: 2.58 X10(3) UL (ref 0.1–1)
MONOCYTES NFR BLD AUTO: 13.7 %
MRSA DNA SPEC QL NAA+PROBE: NEGATIVE
NEUTROPHILS # BLD AUTO: 14.49 X10 (3) UL (ref 1.5–7.7)
NEUTROPHILS # BLD AUTO: 14.49 X10(3) UL (ref 1.5–7.7)
NEUTROPHILS NFR BLD AUTO: 76.8 %
NITRITE UR QL STRIP.AUTO: NEGATIVE
NT-PROBNP SERPL-MCNC: 215 PG/ML (ref ?–125)
OSMOLALITY SERPL CALC.SUM OF ELEC: 281 MOSM/KG (ref 275–295)
OSMOLALITY SERPL CALC.SUM OF ELEC: 284 MOSM/KG (ref 275–295)
P AXIS: 8 DEGREES
P-R INTERVAL: 150 MS
PH UR STRIP.AUTO: 6 [PH] (ref 5–8)
PLATELET # BLD AUTO: 149 10(3)UL (ref 150–450)
PLATELET # BLD AUTO: 201 10(3)UL (ref 150–450)
POTASSIUM SERPL-SCNC: 3.4 MMOL/L (ref 3.5–5.1)
POTASSIUM SERPL-SCNC: 3.9 MMOL/L (ref 3.5–5.1)
PROCALCITONIN SERPL-MCNC: 0.79 NG/ML (ref ?–0.05)
PROT SERPL-MCNC: 6.3 G/DL (ref 5.7–8.2)
Q-T INTERVAL: 296 MS
QRS DURATION: 72 MS
QTC CALCULATION (BEZET): 402 MS
R AXIS: 23 DEGREES
RBC # BLD AUTO: 2.68 X10(6)UL
RBC # BLD AUTO: 2.97 X10(6)UL
RBC UR QL AUTO: NEGATIVE
RH BLOOD TYPE: POSITIVE
SARS-COV-2 RNA RESP QL NAA+PROBE: NOT DETECTED
SODIUM SERPL-SCNC: 135 MMOL/L (ref 136–145)
SODIUM SERPL-SCNC: 137 MMOL/L (ref 136–145)
SP GR UR STRIP.AUTO: 1.02 (ref 1–1.03)
T AXIS: -20 DEGREES
TROPONIN I SERPL HS-MCNC: 3 NG/L
UROBILINOGEN UR STRIP.AUTO-MCNC: >=8 MG/DL
VENTRICULAR RATE: 111 BPM
WBC # BLD AUTO: 16.1 X10(3) UL (ref 4–11)
WBC # BLD AUTO: 18.9 X10(3) UL (ref 4–11)

## 2024-11-19 PROCEDURE — 93970 EXTREMITY STUDY: CPT

## 2024-11-19 PROCEDURE — 99221 1ST HOSP IP/OBS SF/LOW 40: CPT | Performed by: NEUROLOGICAL SURGERY

## 2024-11-19 PROCEDURE — 99223 1ST HOSP IP/OBS HIGH 75: CPT | Performed by: INTERNAL MEDICINE

## 2024-11-19 PROCEDURE — 71045 X-RAY EXAM CHEST 1 VIEW: CPT | Performed by: EMERGENCY MEDICINE

## 2024-11-19 PROCEDURE — 99291 CRITICAL CARE FIRST HOUR: CPT | Performed by: EMERGENCY MEDICINE

## 2024-11-19 RX ORDER — HEPARIN SODIUM AND DEXTROSE 10000; 5 [USP'U]/100ML; G/100ML
INJECTION INTRAVENOUS CONTINUOUS
Status: DISCONTINUED | OUTPATIENT
Start: 2024-11-20 | End: 2024-11-20

## 2024-11-19 RX ORDER — SENNOSIDES 8.6 MG
8.6 TABLET ORAL 2 TIMES DAILY
Status: DISCONTINUED | OUTPATIENT
Start: 2024-11-19 | End: 2024-11-20

## 2024-11-19 RX ORDER — HYDROMORPHONE HYDROCHLORIDE 1 MG/ML
0.2 INJECTION, SOLUTION INTRAMUSCULAR; INTRAVENOUS; SUBCUTANEOUS EVERY 2 HOUR PRN
Status: DISCONTINUED | OUTPATIENT
Start: 2024-11-19 | End: 2024-11-20

## 2024-11-19 RX ORDER — HYDROMORPHONE HYDROCHLORIDE 1 MG/ML
0.1 INJECTION, SOLUTION INTRAMUSCULAR; INTRAVENOUS; SUBCUTANEOUS EVERY 2 HOUR PRN
Status: DISCONTINUED | OUTPATIENT
Start: 2024-11-19 | End: 2024-11-20

## 2024-11-19 RX ORDER — HEPARIN SODIUM AND DEXTROSE 10000; 5 [USP'U]/100ML; G/100ML
18 INJECTION INTRAVENOUS ONCE
Status: COMPLETED | OUTPATIENT
Start: 2024-11-19 | End: 2024-11-19

## 2024-11-19 RX ORDER — ACETAMINOPHEN 500 MG
500 TABLET ORAL EVERY 4 HOURS PRN
Status: DISCONTINUED | OUTPATIENT
Start: 2024-11-19 | End: 2024-11-20

## 2024-11-19 RX ORDER — DOCUSATE SODIUM 100 MG/1
100 CAPSULE, LIQUID FILLED ORAL 2 TIMES DAILY
Status: DISCONTINUED | OUTPATIENT
Start: 2024-11-19 | End: 2024-11-20

## 2024-11-19 RX ORDER — OXYCODONE HYDROCHLORIDE 5 MG/1
5 CAPSULE ORAL EVERY 4 HOURS PRN
COMMUNITY

## 2024-11-19 RX ORDER — BISACODYL 10 MG
10 SUPPOSITORY, RECTAL RECTAL
Status: DISCONTINUED | OUTPATIENT
Start: 2024-11-19 | End: 2024-11-20

## 2024-11-19 RX ORDER — LIDOCAINE HYDROCHLORIDE 10 MG/ML
1 INJECTION, SOLUTION INFILTRATION; PERINEURAL ONCE
Status: COMPLETED | OUTPATIENT
Start: 2024-11-19 | End: 2024-11-19

## 2024-11-19 RX ORDER — POLYETHYLENE GLYCOL 3350 17 G/17G
17 POWDER, FOR SOLUTION ORAL DAILY PRN
Status: DISCONTINUED | OUTPATIENT
Start: 2024-11-19 | End: 2024-11-20

## 2024-11-19 RX ORDER — SODIUM PHOSPHATE, DIBASIC AND SODIUM PHOSPHATE, MONOBASIC 7; 19 G/230ML; G/230ML
1 ENEMA RECTAL ONCE AS NEEDED
Status: DISCONTINUED | OUTPATIENT
Start: 2024-11-19 | End: 2024-11-20

## 2024-11-19 RX ORDER — LIDOCAINE HYDROCHLORIDE 10 MG/ML
1 INJECTION, SOLUTION EPIDURAL; INFILTRATION; INTRACAUDAL; PERINEURAL ONCE
Status: COMPLETED | OUTPATIENT
Start: 2024-11-19 | End: 2024-11-19

## 2024-11-19 RX ORDER — SENNOSIDES 8.6 MG
17.2 TABLET ORAL NIGHTLY PRN
Status: DISCONTINUED | OUTPATIENT
Start: 2024-11-19 | End: 2024-11-20

## 2024-11-19 RX ORDER — VANCOMYCIN 1.75 GRAM/500 ML IN 0.9 % SODIUM CHLORIDE INTRAVENOUS
1750 EVERY 12 HOURS
Status: DISCONTINUED | OUTPATIENT
Start: 2024-11-20 | End: 2024-11-20

## 2024-11-19 RX ORDER — LIDOCAINE HYDROCHLORIDE 20 MG/ML
INJECTION, SOLUTION EPIDURAL; INFILTRATION; INTRACAUDAL; PERINEURAL
Status: COMPLETED
Start: 2024-11-19 | End: 2024-11-19

## 2024-11-19 RX ORDER — LIDOCAINE HYDROCHLORIDE 10 MG/ML
INJECTION, SOLUTION EPIDURAL; INFILTRATION; INTRACAUDAL; PERINEURAL
Status: COMPLETED
Start: 2024-11-19 | End: 2024-11-19

## 2024-11-19 RX ORDER — LIDOCAINE HYDROCHLORIDE 20 MG/ML
1 INJECTION, SOLUTION EPIDURAL; INFILTRATION; INTRACAUDAL; PERINEURAL ONCE
Status: COMPLETED | OUTPATIENT
Start: 2024-11-19 | End: 2024-11-19

## 2024-11-19 RX ORDER — HYDROMORPHONE HYDROCHLORIDE 1 MG/ML
0.4 INJECTION, SOLUTION INTRAMUSCULAR; INTRAVENOUS; SUBCUTANEOUS EVERY 2 HOUR PRN
Status: DISCONTINUED | OUTPATIENT
Start: 2024-11-19 | End: 2024-11-20

## 2024-11-19 NOTE — H&P
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 STATED HISTORY: (As transcribed by Technologist)  Patient states she is having right leg pain and swelling, doesn't feel as swollen on the left leg.    FINDINGS:  EXTREMITY EXAMINED:  Left lower extremity SAPHENOFEMORAL JUNCTION:  No reflux. THROMBI:  None visible. COMPRESSION:  Normal compressibility, phasicity, and augmentation. OTHER:  Negative.            CONCLUSION:  No evidence of DVT in the left lower extremity.   LOCATION:  [id number]    Dictated by ([facility] [[email or web address]]): Bari Neal MD on 10/31/2024 at 7:27 PM     Finalized by ([facility] [[email or web address]]): Bari Neal MD on 10/31/2024 at 7:28 PM       CTA ABD/PEL (CPT=74174)    Result Date: 10/31/2024  PROCEDURE:  CTA ABD/PEL (CPT=74174)  COMPARISON:  [facility] , CT, CT ABDOMEN+PELVIS(CONTRAST ONLY)(CPT=74177), 10/31/2024, 11:35 AM.  INDICATIONS:  Pelvic hematoma rule out active extravasation  TECHNIQUE:  CT images of the abdomen and pelvis were obtained pre- and post- injection of non-ionic intravenous contrast material. Multi-planar reformatted/3-D images were created to optimize visualization of vascular anatomy.  Dose reduction techniques were used. Dose information is transmitted to the ACR  (American College of Radiology) NRDR (National Radiology Data Registry) which includes the Dose Index Registry.  PATIENT STATED HISTORY:(As transcribed by Technologist)   Pelvic hematoma rule out active extravasation. Extreme lower quadrant abdominal pain and back pain.   CONTRAST USED:  100cc of Isovue 370  FINDINGS:  AORTA/VASCULAR:  No aneurysm or dissection.  Normal renal and mesenteric vessels.  There are 2 right renal arteries incidentally noted. LIVER:  No enlargement, atrophy, abnormal density, or significant focal lesion.  BILIARY:  No ductal dilatation.  Status post cholecystectomy. PANCREAS:  No lesion, fluid collection, ductal dilatation, or atrophy.  SPLEEN:  There is a small amount of perisplenic ascites.  No splenic mass or enlargement. KIDNEYS:  There is a delayed dense right nephrogram.  There is excretion of contrast into the right renal collecting system although this is delayed and diminished compared to the left.  There is bilateral hydronephrosis and hydroureter.  There is bilateral distal ureteral narrowing secondary to large pelvic hematoma.  ADRENALS:  No mass or enlargement.  RETROPERITONEUM:  No adenopathy.  BOWEL/MESENTERY:  Sigmoid colon is displaced by changes related to a large pelvic hematoma.  There is a moderate amount of gas and stool seen within the ascending, transverse and descending colon.  Some of the distention could reflect changes related to extrinsic mass effect.  There is fluid identified in the bilateral pericolic gutters extending up to the left upper quadrant adjacent to the spleen.  The fluid in the right pericolic gutter only extends into the lower abdomen.  URINARY BLADDER:  The urinary bladder is markedly effaced and displaced to the left by large pelvic hematoma.  There is contrast within the bladder.  There is no contrast extravasation seen. PELVIC NODES:  No enlarged pelvic lymph nodes are seen. PELVIC ORGANS:  There is a large complex hematoma within the pelvis  with fluid fluid levels.  The large pelvic hematoma displaces the uterus, urinary bladder and sigmoid colon to the left.  There is marked effacement of the urinary bladder.  The uterus is enlarged consistent with recent postpartum state.  Mildly prominent uterine vascularity noted consistent with recent postpartum state.  The intraperitoneal portion of the hematoma measures approximately 13.2 x 11.1 cm which is not significantly changed compared to the recent previous exam performed at 1135 hours.  There is also a rectus sheath component of the hematoma which measures approximately 12.5 x 4.6 cm which is not significantly changed from the previous exam either.  No active contrast extravasation is appreciated in either collections. BONES:  No bony lesion or fracture.  LUNG BASES:  No visible pulmonary or pleural disease.  OTHER:  Negative.             CONCLUSION:  1. Stable large pelvic hematoma with fluid- fluid hematocrit levels noted unchanged from a previous recent CT performed at 1135 hours on 10/31/2024.  There is also a stable large rectus sheath hematoma.  There is no contrast extravasation identified within the pelvic or abdominal wall hematomata.  No evidence for active arterial hemorrhage. 2. There is resultant mass effect upon the pelvic structures.  There is moderate bilateral hydronephrosis and hydroureter secondary to the mass effect upon the pelvic structures.  The urinary bladder is markedly effaced and displaced to the left.  There is a delayed dense right nephrogram with delayed excretion into the right renal collecting system and ureter suggesting significant obstruction.  3. Changes of recent postpartum state noted. 4. Please see above for details.    LOCATION:  [facility]   Dictated by (CST): Keshawn Michael MD on 10/31/2024 at 1:20 PM     Finalized by (CST): Keshawn Michael MD on 10/31/2024 at 1:42 PM       CT ABDOMEN+PELVIS(CONTRAST ONLY)(CPT=74177)    Result Date: 10/31/2024  PROCEDURE:  CT  ABDOMEN+PELVIS (CONTRAST ONLY) (CPT=74177)  COMPARISON:  [facility] , CT, CT ABDOMEN PELVIS IV CONTRAST, NO ORAL (ER), 2023, 6:52 PM.  INDICATIONS:  Abdominal pain status post   TECHNIQUE:  CT scanning was performed from the dome of the diaphragm to the pubic symphysis with non-ionic intravenous contrast material. Post contrast coronal MPR imaging was performed.  Dose reduction techniques were used. Dose information is transmitted to the ACR (American College of Radiology) NRDR (National Radiology Data Registry) which includes the Dose Index Registry.  PATIENT STATED HISTORY:(As transcribed by Technologist)  Patient states abdominal pain status post .   CONTRAST USED:  100cc of Isovue 370  FINDINGS:  LUNG BASES:  Dependent atelectasis bilaterally. LIVER:  Normal in shape and contour. BILIARY:  No intrahepatic biliary dilatation.. SPLEEN:  Normal.  No enlargement or focal lesion. PANCREAS:  No jenny-pancreatic inflammatory stranding ADRENALS:  Normal.  No mass or enlargement.  KIDNEYS:  Mild right-sided hydronephrosis and right ureteral dilatation. BOWEL/MESENTERY:  Bowel is normal in caliber. No evidence of obstruction.  There is fluid and stranding noted within the lower abdomen. PELVIS:  There is complex hematoma within the pelvis with fluid fluid levels measuring 13.8 x 10.5 x 12.5 cm.  Calcified phleboliths within the pelvis.  Postpartum uterus.  Free pelvic fluid.  There is fluid collection within the right rectus muscle measuring approximately 4.7 x 1.6 cm which likely represents hematoma. AORTA/VASCULAR:  Aorta is normal in caliber. BONES:  No acute fractures.            CONCLUSION:  1. Large pelvic hematoma measuring 13.8 x 10.5 x 12.5 cm with fluid fluid levels.  If clinical concern for active extravasation, consider CTA.  This hematoma causes mass effect on the bladder and uterus. 2. Mild bilateral hydronephrosis hydronephrosis the right greater than left. 3. Postpartum uterus. 4. There  is fluid noted within the inferior right rectus muscle which may represent hematoma as well.    LOCATION:  [facility]   Dictated by (CST): Dory Denny MD on 10/31/2024 at 12:07 PM     Finalized by (CST): Dory Denny MD on 10/31/2024 at 12:13 PM       US VENOUS DOPPLER LEG RIGHT - DIAG IMG (CPT=93971)    Result Date: 10/31/2024  PROCEDURE:  US VENOUS DOPPLER LEG RIGHT - DIAG IMG (CPT=93971)  COMPARISON:  [facility] , US, US VENOUS DOPPLER LEG RIGHT - DIAG IMG (CPT=93971), 3/06/2018, 11:26 PM.  INDICATIONS:  Right lower extremity edema  TECHNIQUE:  Real time, grey scale, and duplex ultrasound was used to evaluate the lower extremity venous system. B-mode two-dimensional images of the vascular structures, Doppler spectral analysis, and color flow.  Doppler imaging were performed.  The following veins were imaged:  Common, deep, and superficial femoral, popliteal, sapheno-femoral junction, posterior tibial veins, and the contralateral common femoral vein.  PATIENT STATED HISTORY: (As transcribed by Technologist)  Patient states she has right lateral leg pain and numbness.    FINDINGS:  EXTREMITY EXAMINED:  Right lower extremity SAPHENOFEMORAL JUNCTION:  No reflux. THROMBI:  None visible. COMPRESSION:  Normal compressibility, phasicity, and augmentation. OTHER:  Negative.            CONCLUSION:  No evidence of deep vein thrombus in the right lower extremity.   LOCATION:  [facility]    Dictated by (CST): True Wallace MD on 10/31/2024 at 10:53 AM     Finalized by (CST): True Wallace MD on 10/31/2024 at 10:55 AM       US VENOUS DOPPLER LEG BILAT - DIAG IMG (CPT=93970)    Result Date: 10/27/2024  PROCEDURE:  US VENOUS DOPPLER LEG BILAT - DIAG IMG (CPT=93970)  COMPARISON:  None.  INDICATIONS:  h/o DVT, recent postpartum, right calf pain  TECHNIQUE:  Real time, grey scale, and duplex ultrasound was used to evaluate the lower extremity venous system. B-mode two-dimensional images of the vascular structures, Doppler spectral  analysis, and color flow.  Doppler imaging were performed.  The following veins were imaged bilaterally:  Common, deep, and superficial femoral, popliteal, sapheno-femoral junction, and posterior tibial veins.  PATIENT STATED HISTORY: (As transcribed by Technologist)  history of deep venous thrombosis,recent post pardom, right calf pain and swelling    FINDINGS:  SAPHENOFEMORAL JUNCTION:  No reflux. THROMBI:  There is complete thrombus 1 of the paired proximal right posterior tibial veins consistent with DVT.  There is no evidence of DVT in the left leg. COMPRESSION:  Normal compressibility, phasicity, and augmentation left leg.            CONCLUSION:  There is complete DVT in 1 of the paired proximal right posterior tibial veins.  Left leg is negative for DVT.  Critical exam results phoned to nurse Beverly on 10/27/2024 at 1457 hours with read back.   LOCATION:  [facility]   Dictated by (CST): Suma Keith MD on 10/27/2024 at 2:57 PM     Finalized by (CST): Suma Keith MD on 10/27/2024 at 2:58 PM          Assessment/Plan:     24 year old female with PMH sig for asthma, depression/anxiety, ITP, hypothyroidism, history of MARYSOL, history of DVT/PE () status post IVC who recently underwent  delivery on 10/25 complicated by pelvic/rectus sheath hematoma requiring transfusions and ICU admission at [facility] earlier this month presented with leg weakness.    Sepsis POA, unclear etiology, discitis? Infected pelvic hematoma? High risk for bacteremia given multiple IV exposures through her healthcare visits over the past month   - cont BS abx, vanc/zosyn  - obtain cultures  - IVF resuscitation  - goal MAP>65, pressors per ICU  - intensivist, ID consulted  - elevated inflammatory markers from above    RLE weakness  - spinal stenosis on MRI  - Spine team following, await further recs    R hydronephrosis  - hematoma causing ureteral compression  - consult urology    DVT/PE  - s/p IVC  - not on AC due to hematoma  -  repeat US given significant swelling, high liklihood that her DVT's could propagate     Chronic anemia  - recent postop hemorrhage  - stable  - monitor     S/p  delivery  - OB following    Chronic:  Asthma  Depression/anxiety  Hypothyroidism  Hx MARYSOL    FEN: regular diet, PT/OT  Proph: SCDs  Code status: Full code    Outpatient records or previous hospital records reviewed.   [facility] hospitalist to continue to follow patient while in house      Mallory Loya MD  [facility]  Hospitalist  Message over [facility]  Pager: [phone]                 [1]   Allergies  Allergen Reactions    Mastisol Adhesive RASH    Nystatin RASH
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
## 2024-11-19 NOTE — ED INITIAL ASSESSMENT (HPI)
Pt to ED for abnormal MRI last night. Pt had multiple problems post delivery. Pt states unable to feel her right leg.
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
## 2024-11-19 NOTE — CM/SW NOTE
Pt mom called CM on arrival to ER. CM met pt outside and assisted her from her vehicle in to a wheelchair and pt was taken to A9. MD and RN both aware of pt arrival.

## 2024-11-19 NOTE — PROGRESS NOTES
11/19/24 1640   BiPAP   $ RT Standby Charge (per 15 min) 1   BiPAP/CPAP Monitored Parameters   Toleration Refused
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
## 2024-11-19 NOTE — CM/SW NOTE
Late entry: CM assistance requested by Dr. Landaverde to contact pt to be sure she is seen by her PCP today otherwise if symptoms have worsened,pt should return to ED to be admitted.    CM received a message on voice machine from pt's mom. Pt mom concerned about returning to the ER and having to pay another ER visit/copay. Pt's mom expressed frustration of lengthy ER visit yesterday. Per mom pt has been \"back to the hospital two times since her 4 day stay in the ICU.\" Pt has a  and pt mom has a fx leg and \"she just keep getting sent home.\"    CM returned pt mom/pt call. CM advised pt mom to return to the ER and CM will assist pt in to the ER and in to a room. CM discussed with Dr. Wei. Dorina Mart RN also aware of the above.
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
## 2024-11-20 ENCOUNTER — APPOINTMENT (OUTPATIENT)
Dept: CV DIAGNOSTICS | Facility: HOSPITAL | Age: 24
End: 2024-11-20
Attending: NURSE PRACTITIONER
Payer: COMMERCIAL

## 2024-11-20 VITALS
BODY MASS INDEX: 42.05 KG/M2 | OXYGEN SATURATION: 100 % | RESPIRATION RATE: 21 BRPM | HEART RATE: 103 BPM | WEIGHT: 283.94 LBS | HEIGHT: 69 IN | TEMPERATURE: 99 F | SYSTOLIC BLOOD PRESSURE: 101 MMHG | DIASTOLIC BLOOD PRESSURE: 61 MMHG

## 2024-11-20 PROBLEM — L03.115 CELLULITIS OF RIGHT LOWER EXTREMITY: Status: ACTIVE | Noted: 2024-11-20

## 2024-11-20 PROBLEM — R29.898 WEAKNESS OF BOTH LOWER EXTREMITIES: Status: ACTIVE | Noted: 2024-11-20

## 2024-11-20 LAB
ALBUMIN SERPL-MCNC: 3.1 G/DL (ref 3.2–4.8)
ALBUMIN/GLOB SERPL: 1.3 {RATIO} (ref 1–2)
ALP LIVER SERPL-CCNC: 97 U/L
ALT SERPL-CCNC: <7 U/L
ANION GAP SERPL CALC-SCNC: 2 MMOL/L (ref 0–18)
APTT PPP: 122.6 SECONDS (ref 23–36)
APTT PPP: 81.6 SECONDS (ref 23–36)
AST SERPL-CCNC: 12 U/L (ref ?–34)
BASOPHILS # BLD AUTO: 0.03 X10(3) UL (ref 0–0.2)
BASOPHILS NFR BLD AUTO: 0.2 %
BILIRUB SERPL-MCNC: 0.8 MG/DL (ref 0.3–1.2)
BUN BLD-MCNC: 10 MG/DL (ref 9–23)
CALCIUM BLD-MCNC: 8.1 MG/DL (ref 8.7–10.4)
CHLORIDE SERPL-SCNC: 105 MMOL/L (ref 98–112)
CO2 SERPL-SCNC: 28 MMOL/L (ref 21–32)
CREAT BLD-MCNC: 0.62 MG/DL
DEPRECATED HBV CORE AB SER IA-ACNC: 374 NG/ML
EGFRCR SERPLBLD CKD-EPI 2021: 126 ML/MIN/1.73M2 (ref 60–?)
EOSINOPHIL # BLD AUTO: 0.09 X10(3) UL (ref 0–0.7)
EOSINOPHIL NFR BLD AUTO: 0.6 %
ERYTHROCYTE [DISTWIDTH] IN BLOOD BY AUTOMATED COUNT: 14.3 %
FOLATE SERPL-MCNC: 8.5 NG/ML (ref 5.4–?)
GLOBULIN PLAS-MCNC: 2.4 G/DL (ref 2–3.5)
GLUCOSE BLD-MCNC: 114 MG/DL (ref 70–99)
HAPTOGLOB SERPL-MCNC: 108 MG/DL (ref 30–200)
HCT VFR BLD AUTO: 24.9 %
HGB BLD-MCNC: 8 G/DL
HGB RETIC QN AUTO: 28.1 PG (ref 28.2–36.6)
IMM GRANULOCYTES # BLD AUTO: 0.09 X10(3) UL (ref 0–1)
IMM GRANULOCYTES NFR BLD: 0.6 %
IMM RETICS NFR: 0.27 RATIO (ref 0.1–0.3)
IRON SATN MFR SERPL: 4 %
IRON SERPL-MCNC: 8 UG/DL
LDH SERPL L TO P-CCNC: 306 U/L
LYMPHOCYTES # BLD AUTO: 1.14 X10(3) UL (ref 1–4)
LYMPHOCYTES NFR BLD AUTO: 7.7 %
MCH RBC QN AUTO: 31.4 PG (ref 26–34)
MCHC RBC AUTO-ENTMCNC: 32.1 G/DL (ref 31–37)
MCV RBC AUTO: 97.6 FL
MONOCYTES # BLD AUTO: 1.56 X10(3) UL (ref 0.1–1)
MONOCYTES NFR BLD AUTO: 10.6 %
NEUTROPHILS # BLD AUTO: 11.86 X10 (3) UL (ref 1.5–7.7)
NEUTROPHILS # BLD AUTO: 11.86 X10(3) UL (ref 1.5–7.7)
NEUTROPHILS NFR BLD AUTO: 80.3 %
OSMOLALITY SERPL CALC.SUM OF ELEC: 280 MOSM/KG (ref 275–295)
PLATELET # BLD AUTO: 155 10(3)UL (ref 150–450)
POTASSIUM SERPL-SCNC: 3.8 MMOL/L (ref 3.5–5.1)
POTASSIUM SERPL-SCNC: 3.8 MMOL/L (ref 3.5–5.1)
PROT SERPL-MCNC: 5.5 G/DL (ref 5.7–8.2)
RBC # BLD AUTO: 2.55 X10(6)UL
RETICS # AUTO: 78.7 X10(3) UL (ref 22.5–147.5)
RETICS/RBC NFR AUTO: 3 %
SODIUM SERPL-SCNC: 135 MMOL/L (ref 136–145)
TOTAL IRON BINDING CAPACITY: 191 UG/DL (ref 250–425)
TRANSFERRIN SERPL-MCNC: 140 MG/DL (ref 250–380)
VIT B12 SERPL-MCNC: 242 PG/ML (ref 211–911)
WBC # BLD AUTO: 14.8 X10(3) UL (ref 4–11)

## 2024-11-20 PROCEDURE — 93306 TTE W/DOPPLER COMPLETE: CPT | Performed by: NURSE PRACTITIONER

## 2024-11-20 PROCEDURE — 99233 SBSQ HOSP IP/OBS HIGH 50: CPT | Performed by: INTERNAL MEDICINE

## 2024-11-20 PROCEDURE — 99231 SBSQ HOSP IP/OBS SF/LOW 25: CPT | Performed by: NEUROLOGICAL SURGERY

## 2024-11-20 PROCEDURE — 99223 1ST HOSP IP/OBS HIGH 75: CPT | Performed by: INTERNAL MEDICINE

## 2024-11-20 PROCEDURE — 03HY32Z INSERTION OF MONITORING DEVICE INTO UPPER ARTERY, PERCUTANEOUS APPROACH: ICD-10-PCS | Performed by: HOSPITALIST

## 2024-11-20 PROCEDURE — 36620 INSERTION CATHETER ARTERY: CPT | Performed by: EMERGENCY MEDICINE

## 2024-11-20 PROCEDURE — 99291 CRITICAL CARE FIRST HOUR: CPT | Performed by: OTHER

## 2024-11-20 RX ORDER — VANCOMYCIN 1.75 GRAM/500 ML IN 0.9 % SODIUM CHLORIDE INTRAVENOUS
1750 EVERY 12 HOURS
Status: SHIPPED | COMMUNITY
Start: 2024-11-21

## 2024-11-20 RX ORDER — POLYETHYLENE GLYCOL 3350 17 G/17G
17 POWDER, FOR SOLUTION ORAL DAILY PRN
Status: SHIPPED | COMMUNITY
Start: 2024-11-20

## 2024-11-20 RX ORDER — HEPARIN SODIUM AND DEXTROSE 10000; 5 [USP'U]/100ML; G/100ML
INJECTION INTRAVENOUS CONTINUOUS
Status: SHIPPED | COMMUNITY
Start: 2024-11-20

## 2024-11-20 RX ORDER — SENNOSIDES A AND B 8.6 MG/1
8.6 TABLET, FILM COATED ORAL 2 TIMES DAILY
Status: SHIPPED | COMMUNITY
Start: 2024-11-20

## 2024-11-20 RX ORDER — OXYCODONE HYDROCHLORIDE 10 MG/1
10 TABLET ORAL EVERY 4 HOURS PRN
Status: DISCONTINUED | OUTPATIENT
Start: 2024-11-20 | End: 2024-11-20

## 2024-11-20 RX ORDER — OXYCODONE HYDROCHLORIDE 5 MG/1
5 TABLET ORAL EVERY 4 HOURS PRN
Status: DISCONTINUED | OUTPATIENT
Start: 2024-11-20 | End: 2024-11-20

## 2024-11-20 RX ORDER — CYCLOBENZAPRINE HCL 10 MG
10 TABLET ORAL 3 TIMES DAILY PRN
Status: DISCONTINUED | OUTPATIENT
Start: 2024-11-20 | End: 2024-11-20

## 2024-11-20 RX ORDER — ACETAMINOPHEN 500 MG
1000 TABLET ORAL EVERY 6 HOURS PRN
Status: DISCONTINUED | OUTPATIENT
Start: 2024-11-20 | End: 2024-11-20
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
## 2024-11-20 NOTE — PROCEDURES
Arterial Line  Performed by:      General Information and Staff     Procedure Start:   Patient Location:  ICU  Indication: continuous blood pressure monitoring and blood sampling needed    Site Identification: real time ultrasound guided, sterile technique used     Procedure Details     Catheter Size:  20 G  Catheter Length:  1 and 3/4 inchCatheter Type:  Arrow  Seldinger Technique?: Yes    Laterality:  R radial 1st attempt failed left side X 2  Site:     Site Prep: chlorhexidine  Line Secured:  Tape and Tegaderm     Assessment: Keith's test negative, Good flash, guidewire and catheter advanced without difficulty, pulsatile blood flow noted.    Events: patient tolerated procedure well with no complications

## 2024-11-20 NOTE — PROGRESS NOTES
OB/GYN Progress Notes    Patient complaints: Feeling sore in lower body. Currently NPO.     /74   Pulse 110   Temp 99.8 °F (37.7 °C) (Temporal)   Resp 17   Ht 5' 9\" (1.753 m)   Wt 283 lb 15.2 oz (128.8 kg)   SpO2 94%   Breastfeeding No   BMI 41.93 kg/m²     Examination:     General: NAD    Abdomen: soft, nontender, no drainage or erythema, healing hematoma on R corner   Extremities: Edema bilaterally    Pertinent new labs and imaging:   PROCEDURE:  US VENOUS DOPPLER LEG BILAT - DIAG IMG (CPT=93970)     COMPARISON:  [facility],  VENOUS DOPPLER LEG BILAT - DIAG IMG (CPT=93970), 11/08/2024, 7:46 PM.     INDICATIONS:  BLE swelling R > L, RLE numbness and erythema     TECHNIQUE:  Real time, grey scale, and duplex ultrasound was used to evaluate the lower extremity venous system. B-mode two-dimensional images of the vascular structures, Doppler spectral analysis, and color flow.  Doppler imaging were performed.  The  following veins were imaged bilaterally:  Common, deep, and superficial femoral, popliteal, sapheno-femoral junction, and posterior tibial veins.     PATIENT STATED HISTORY: (As transcribed by Technologist)           FINDINGS:    SAPHENOFEMORAL JUNCTION:  No reflux.  THROMBI:  Extensive bilateral lower extremity DVT is present extending from the saphenofemoral junctions to the distal calves bilaterally.  COMPRESSION:  Noncompressible segments as described above.  OTHER:  Negative.                   Impression   CONCLUSION:  Extensive bilateral lower extremity DVT is present extending from the saphenofemoral junctions the distal calves bilaterally.  Please note that there is an inferior vena cava filter with extensive thrombus extending from the IVC to the calf  veins.  Consider consultation for thrombectomy, or thrombolysis if the patient is a candidate.     Critical results were discussed with TEJA Awan and Dr. Biswas by Dr. Greenberg at approximately 1900 hrs on 11/19/24.  Read back was  performed.          IMPRESSION: Carmela Bell is a 24 year old  post-op from repeat  on 10/25/24 presenting with RLE pain and weakness, found to have sepsis of unknown origin. Now with new finding of extensive DVT.       PLAN:   -new finding of extensive DVT extending to IVF filter and even above filter, now on heparin drip, vascular to see today for possible thrombectomy  -reviewed case with ICU doctor and Dr. Aguilar from gyn onc (curbside discussion,not officially on consult). Pelvic hematoma is unchanged in size. Questionable source of sepsis? Unclear based on imaging. Dr. Aguilar recommended drainage per IR, can culture and see if hematoma vs abscess. He also believes hematoma to be retroperitoneal, recommends gen surg consult.  Will keep in touch with ICU physician today.
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
## 2024-11-20 NOTE — PAYOR COMM NOTE
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    Reason for Consultation:   DVT     History of Present Illness:   Carmela Bell is a(n) 24 year old female with a past history of depression / anxiety and asthma.  Pt underwent panniculectomy  with course complicated by DVT.  Underwent 6 month Rx with Eliquis.  Saw hematology and pt reports negative assessment for hypercoagulability. Underwent  10/25.  RLE swelling noted 10/27 with RLE DVT noted.  Pt was placed on Lovenox in that context and discharged.  Pt returned 10/31 with abdominal pain and hypotension.  Pelvic / rectus sheath hematoma identified requiring transfusion and ICU.  IVC filter placed 24 with patient remaining off systemic anticoagulation.  Returned to ER  with BLE swelling.  Venous doppler performed and reported negative.  Returned to ER  with swelling and pain of RLE associated with reported weakness.  MRI performed with severe spinal canal stenosis at L4-5 along with infiltration of the epidural fat along the ventral portion of the spinal canal from L3-S1 with no obvious loculated fluid collections. Seen by neurosurgery with no intervention indicated.  Venous doppler with  extensive BLE DVT noted.  Has had Tx 102 since hospitalization.  CT abdomen / pelvis with 13.9 X 10.5 cm hematoma noted in the pelvis.  No chest pain, pressure heaviness, or tightness.  Activity has been limited of late.  Largely in recliner at home.  Some sense of needing deep breath with ambulation and ;lightheadedness.  No palpitations.        Assessment:   Extensive DVT BLE  -Prior DVT  with 6 month course Eliquis  -DVT 10/27/24  -S/P IVC filter 24  -Negative BLE venous doppler reported 24  -Extensive BLE DVT now noted  -Pt reports past heme evaluation negative for hypercoagulability  -On IV Heparin at present  2.     Fever / Leukocytosis                -                -Potential secondary infection of pelvic hematoma  3.     10/25  4.    Pelvic / rectus sheath hematoma identified requiring transfusion and ICU 10/31 after systemic anticoagulation   5.    RLE weakness.  MRI performed with severe spinal canal stenosis at L4-5 along with infiltration of the epidural fat along the ventral portion of the spinal canal from L3-S1 with no obvious loculated fluid collections. Seen by neurosurgery with no intervention indicated.    6.    Anemia                -Hb decline to 8.0        Plan:   ATBx per ID.  ID following   Consideration into evacuation of pelvic hematoma   IV Heparin at present   Echo   Vascular surgical evaluation in consideration of thrombolysis / thrombectomy   Tele monitor    F/U H+H         OB GYN    IMPRESSION: Carmela Bell is a 24 year old  post-op from repeat  on 10/25/24 presenting with RLE pain and weakness, found to have sepsis of unknown origin. Now with new finding of extensive DVT.        PLAN:   -new finding of extensive DVT extending to IVF filter and even above filter, now on heparin drip, vascular to see today for possible thrombectomy  -reviewed case with ICU doctor and Dr. Aguilar from gyn onc (curbside discussion,not officially  on consult). Pelvic hematoma is unchanged in size. Questionable source of sepsis? Unclear based on imaging. Dr. Aguilar recommended drainage per IR, can culture and see if hematoma vs abscess. He also believes hematoma to be retroperitoneal, recommends gen surg consult.  Will keep in touch with ICU physician today.          Urology     Reason for Consultation:  Hydronephrosis     History of Present Illness:  Carmela Bell is a a(n) 24 year old female PMHx significant for Anxiety, Depression, Hypothyroidism, MARYSOL, hx of PE and DVT s/p IVC filter recently admitted with retroperitoneal hematoma post . Patient presents to the ER  and again  for continued right leg numbness and pain. MRI reveals severe spinal canal stenosis at L3-L4 and at L4-L5. Concern for infection secondary to elevated procalcitonin, WBC, drainage at incision site.      Patient reports difficulty voiding/urinary leakage since last week.       No dysuria or hematuria         DIFFICULTY VOIDING/URINARY LEAKAGE  HYDRONEPHROSIS  PELVIC HEMATOMA  EXTENSIVE BILATERAL LE DVT INCLUDING HER IVC FILTER  Tmax 100.2 on , Tmax 102 so far today  Lactic acid 1.4  Procalcitonin 0.79  WBC 18.9 > 16.1 > 14.8  Hgb 9.5 > 8.6 > 8  Platelet count 201 > 149 > 155  Serum creat 0.83 > 0.63 > 0.62  UA 24: 1-5 WBC/hpf, no RBC, no bacteria  2/2 blood cultures 24: pending  Rapid SARS-CoV-2 by PCR 24: negative     Recommendations:  Patient seen with Dr. Rupert Jimenez.     Dr. Jimenez reviewed CT scan films - worsening right hydronephrosis with delayed excretion on the right, mild left caliectasis, left ureter not dilated.    Recommend a repeat CT scan in 1 week vs cystoscopy, bilateral retrograde pyelogram, and insertion of right ureteral stent once her vascular situation has stabilized.       Check final cultures results  Abx per ID  Follow labs, temp, UOP  CPM with pritchard catheter     Neurosurgery, neurology, OB/GYNE, ID,  Hematology/oncology, cardiology, vascular surgery, and general surgery following         Vascular        The patient is a 24 year old female who presents with extensive bilateral lower extremity DVT including her IVC filter. Discussed with patient and her mother. She will need bilateral lower extremity thrombectomy with IVC filter removal. She will need to stay on a heparin drip and will be anticoagulated afterwards as well. We will coordinate timing with the cath lab and anesthesia. Ok for diet today after her other procedures.          Surgery    HPI: I was consulted in regard to her pelvic hematoma  She is s/p  10-  She c/o severe lower extremity swelling and weakness  She was noted to have extensive clotting in her vena cava extending into her pelvic veins  She also has a stable pelvic hematoma         EXAM: abd obese soft mild lower abd tenderness, no guarding or rebound  Lower extremities severe swelling        IMAGING: cat scan report and images personally reviewed        IMPRESSION: stable pelvic hematoma  Severe lower extremity edema        PLAN: observation for hematoma  Specifically I would not aspirate hematoma at this time  Consider transfer to tertiary care center    MEDICATIONS ADMINISTERED IN LAST 1 DAY:  acetaminophen (Tylenol Extra Strength) tab 500 mg       Date Action Dose Route User    2024 0233 Given 500 mg Oral Dev Zapata RN    2024 1756 Given 500 mg Oral Emperatriz Jackson RN          ceFEPIme (Maxipime) 1 g in sodium chloride 0.9% 100 mL IVPB-MBP       Date Action Dose Route User    2024 1347 New Bag 1 g Intravenous Marcella Arnold RN    2024 0530 New Bag 1 g Intravenous Dev Zapata RN    2024 2321 New Bag 1 g Intravenous Dev Zapata RN          heparin (Porcine) 90284 units/250mL infusion PE/DVT/THROMBUS CONTINUOUS       Date Action Dose Route User    2024 0436 New Bag 2,300 Units/hr Intravenous Dev Zapata  TEJA          HYDROmorphone (Dilaudid) 1 MG/ML injection 0.4 mg       Date Action Dose Route User    11/20/2024 1134 Given 0.4 mg Intravenous Marcella Arnold RN    11/20/2024 0821 Given 0.4 mg Intravenous Marcella Arnold RN    11/20/2024 0317 Given 0.4 mg Intravenous Dev Zapata RN    11/20/2024 0038 Given 0.4 mg Intravenous Dev Zapata RN    11/19/2024 2101 Given 0.4 mg Intravenous Deysi Tracy RN          heparin (Porcine) 25922 units/250mL infusion (PE/DVT/THROMBUS) INITIAL DOSE       Date Action Dose Route User    11/19/2024 2318 New Bag 18 Units/kg/hr × 126.4 kg Intravenous Deysi Tracy RN          lidocaine PF (Xylocaine-MPF) 1% injection       Date Action Dose Route User    11/19/2024 2211 Given by Other 1 mL Intradermal Dev Zapata RN          lidocaine PF (Xylocaine-MPF) 1 % injection       Date Action Dose Route User    11/19/2024 2323 Given 20 mg Intradermal Dev Zapata RN          lidocaine PF (Xylocaine-MPF) 2 % injection       Date Action Dose Route User    11/19/2024 2325 Given 2 mL Intradermal Dev Zapata RN          lidocaine PF (Xylocaine-MPF) 2% injection       Date Action Dose Route User    11/19/2024 2325 Given 2 mL Intradermal Dev Zapata RN          lidocaine (Xylocaine) 1 % injection       Date Action Dose Route User    11/19/2024 2323 Given 20 mg Intradermal Dev Zapata RN          lidocaine-menthol 4-1 % patch 1 patch       Date Action Dose Route User    11/20/2024 1133 Patch Applied 1 patch Transdermal (Mid Back) Marcella Arnold RN          piperacillin-tazobactam (Zosyn) 3.375 g in dextrose 5% 100 mL IVPB-ADDV       Date Action Dose Route User    11/19/2024 1800 New Bag 3.375 g Intravenous Emperatriz Jackson RN          potassium chloride 40 mEq in 250mL sodium chloride 0.9% IVPB premix       Date Action Dose Route User    11/19/2024 1800 New Bag 40 mEq Intravenous Welding, Emperatriz, RN          vancomycin (Vancocin) 1.75 g in sodium  chloride 0.9% 500mL IVPB premix       Date Action Dose Route User    11/20/2024 1354 New Bag 1,750 mg Intravenous Marcella Arnold RN    11/20/2024 0224 New Bag 1,750 mg Intravenous Dev Zapata, TEJA          oxyCODONE immediate release tab 15 mg       Date Action Dose Route User    11/20/2024 1134 Given 15 mg Oral Marcella Arnold, RN            Vitals (last day)       Date/Time Temp Pulse Resp BP SpO2 Weight O2 Device O2 Flow Rate (L/min) [facility]    11/20/24 1300 -- 111 21 -- 99 % -- -- --     11/20/24 1200 -- 105 18 104/64 95 % -- -- --     11/20/24 1100 -- 107 14 -- 98 % -- -- --     11/20/24 1000 -- 96 20 -- 95 % -- -- --     11/20/24 0900 99.8 °F (37.7 °C) 110 17 -- 94 % -- -- --     11/20/24 0800 -- 106 19 103/74 95 % -- None (Room air) --     11/20/24 0700 -- 105 19 -- 99 % -- -- --     11/20/24 0600 -- 104 20 -- 95 % 283 lb 15.2 oz (128.8 kg) -- --     11/20/24 0500 -- 104 21 -- 92 % -- -- --     11/20/24 0400 98.5 °F (36.9 °C) 109 21 92/54 91 % -- None (Room air) --     11/20/24 0300 -- 115 25 -- 93 % -- -- --     11/20/24 0200 102 °F (38.9 °C) 122 16 -- 98 % -- -- --     11/20/24 0100 -- 107 22 -- 93 % -- -- --     11/20/24 0000 100 °F (37.8 °C) 108 17 104/72 95 % -- None (Room air) --     11/19/24 2300 -- 113 18 104/85 95 % -- -- --     11/19/24 2200 -- 106 16 -- 97 % -- -- --     11/19/24 2100 -- 101 18 102/72 94 % -- -- --     11/19/24 2000 100.2 °F (37.9 °C) 102 21 101/73 97 % -- None (Room air) --     11/19/24 1900 -- 101 17 103/75 98 % -- -- -- BK    11/19/24 1800 -- 90 22 98/68 99 % -- -- --     11/19/24 1700 -- 87 19 91/70 100 % -- -- --     11/19/24 1617 -- 83 20 95/65 98 % -- -- --     11/19/24 1602 -- -- -- -- -- 278 lb 10.6 oz (126.4 kg) -- --     11/19/24 1526 99.3 °F (37.4 °C) 92 19 98/71 100 % -- None (Room air) --     11/19/24 1445 -- 91 16 102/74 98 % -- -- --     11/19/24 1430 -- 92 23 96/69 93 % -- -- --     11/19/24 1415 -- 93 23  94/72 93 % -- -- -- JW    11/19/24 1345 -- 87 19 99/72 96 % -- -- -- JW    11/19/24 1330 -- 88 15 99/67 98 % -- -- -- JW    11/19/24 1315 -- 96 23 98/70 95 % -- -- -- JW    11/19/24 1215 -- 111 22 86/67 94 % -- -- -- JW    11/19/24 1152 -- 116 -- 78/56 -- -- -- -- PD    11/19/24 1150 98.4 °F (36.9 °C) 120 24 88/59 96 % 272 lb (123.4 kg) None (Room air) -- PD          CIWA Scores (since admission)       None

## 2024-11-20 NOTE — CM/SW NOTE
Care Coordination [facility] Transfer Note:  Reason for transfer:     Higher level of care    Request initiated by:  Dr Mi    Active Acute Medical issue:  ITP, sepsis, DVT, PE s/p c section 10/25/2024        Anticipated Transfer Plan:   1054  [facility] called, info given and face sheet faxed. MD lance BRUNER number given to transfer center    1104  [facility] is currently on transfer hold-will not take info at this time. Advised to call back tomorrow to check on status    1105  [facility] [place], not accepting transfer information. Advised to call back tomorrow to check on status    1109  [facility] no beds, declined taking information and advised to call [place]. Advised to call back tomorrow if we are unable to find another hospital to transfer to.    1115  [facility] currently at capacity. Not taking any transfer info, advised call back tomorrow     1127 [facility] contacted, face sheet faxed and MD lance BRUNER number given    Dr Mi informed of above

## 2024-11-20 NOTE — PROGRESS NOTES
[facility]   part of [facility]    Neurosurgery Progress Note  2024    Carmela Bell Patient Status:  Inpatient    2000 MRN [id number]   Location [facility] 4SW-A Attending Doug Castaneda MD   Hosp Day # 1 PCP Deven Sharif DO     SUBJECTIVE:  Carmela Bell is a(n) 24 year old female with LE DVT and pelvic blood clot.  Plan for possible thrombectomy and LE DVT.  She continues to report RLE numbness.  She has weakness in the RLE, pain limited.  She is moving all other extremities without issue.      OBJECTIVE / PHYSICAL EXAM:  Vital Signs:  Blood pressure 103/74, pulse 110, temperature 99.8 °F (37.7 °C), temperature source Temporal, resp. rate 17, height 69\", weight 283 lb 15.2 oz (128.8 kg), SpO2 94%, not currently breastfeeding.    Neuro:    MS: awake and alert    CN: pupils 3/2 B/L, EOMI   Motor: PECK, no drift, weakness of IP/Q/H in RLE-pain limited  Sensory :intact to LT throughout      Lab Results (last 24 hours):  Recent Results (from the past 24 hours)   Comp Metabolic Panel (14)    Collection Time: 24 12:02 PM   Result Value Ref Range    Glucose 116 (H) 70 - 99 mg/dL    Sodium 135 (L) 136 - 145 mmol/L    Potassium 3.4 (L) 3.5 - 5.1 mmol/L    Chloride 100 98 - 112 mmol/L    CO2 28.0 21.0 - 32.0 mmol/L    Anion Gap 7 0 - 18 mmol/L    BUN 12 9 - 23 mg/dL    Creatinine 0.83 0.55 - 1.02 mg/dL    Calcium, Total 8.7 8.7 - 10.4 mg/dL    Calculated Osmolality 281 275 - 295 mOsm/kg    eGFR-Cr 100 >=60 mL/min/1.73m2    AST 19 <34 U/L    ALT <7 (L) 10 - 49 U/L    Alkaline Phosphatase 101 (H) 37 - 98 U/L    Bilirubin, Total 1.4 (H) 0.3 - 1.2 mg/dL    Total Protein 6.3 5.7 - 8.2 g/dL    Albumin 3.5 3.2 - 4.8 g/dL    Globulin  2.8 2.0 - 3.5 g/dL    A/G Ratio 1.3 1.0 - 2.0   CBC With Differential With Platelet    Collection Time: 24 12:02 PM   Result Value Ref Range    WBC 18.9 (H) 4.0 - 11.0 x10(3) uL    RBC 2.97 (L) 3.80 - 5.30 x10(6)uL    HGB 9.5 (L) 12.0 - 16.0  g/dL    HCT 28.7 (L) 35.0 - 48.0 %    .0 150.0 - 450.0 10(3)uL    MCV 96.6 80.0 - 100.0 fL    MCH 32.0 26.0 - 34.0 pg    MCHC 33.1 31.0 - 37.0 g/dL    RDW 14.5 %    Neutrophil Absolute Prelim 14.49 (H) 1.50 - 7.70 x10 (3) uL    Neutrophil Absolute 14.49 (H) 1.50 - 7.70 x10(3) uL    Lymphocyte Absolute 1.60 1.00 - 4.00 x10(3) uL    Monocyte Absolute 2.58 (H) 0.10 - 1.00 x10(3) uL    Eosinophil Absolute 0.01 0.00 - 0.70 x10(3) uL    Basophil Absolute 0.03 0.00 - 0.20 x10(3) uL    Immature Granulocyte Absolute 0.14 0.00 - 1.00 x10(3) uL    Neutrophil % 76.8 %    Lymphocyte % 8.5 %    Monocyte % 13.7 %    Eosinophil % 0.1 %    Basophil % 0.2 %    Immature Granulocyte % 0.7 %   Lactic Acid, Plasma    Collection Time: 11/19/24 12:02 PM   Result Value Ref Range    Lactic Acid 1.4 0.5 - 2.0 mmol/L   Troponin I (High Sensitivity)    Collection Time: 11/19/24 12:02 PM   Result Value Ref Range    Troponin I (High Sensitivity) 3 <=34 ng/L   Pro Beta Natriuretic Peptide    Collection Time: 11/19/24 12:02 PM   Result Value Ref Range    Pro-Beta Natriuretic Peptide 215 (H) <125 pg/mL   ABORH (Blood Type)    Collection Time: 11/19/24 12:02 PM   Result Value Ref Range    ABO BLOOD TYPE O     RH BLOOD TYPE Positive    Antibody Screen    Collection Time: 11/19/24 12:02 PM   Result Value Ref Range    Antibody Screen Negative    Procalcitonin    Collection Time: 11/19/24 12:02 PM   Result Value Ref Range    Procalcitonin 0.79 (H) <0.05 ng/mL   Sed Rate, Westergren (Automated)    Collection Time: 11/19/24 12:02 PM   Result Value Ref Range    Sed Rate 101 (H) 0 - 20 mm/Hr   C-Reactive Protein    Collection Time: 11/19/24 12:02 PM   Result Value Ref Range    C-Reactive Protein 16.90 (H) <=0.50 mg/dL   Scan slide    Collection Time: 11/19/24 12:02 PM   Result Value Ref Range    Slide Review       Slide reviewed, normal WBC, RBC, and platelet morphology.   Urinalysis with Culture Reflex    Collection Time: 11/19/24 12:26 PM     Specimen: Urine, clean catch   Result Value Ref Range    Urine Color Yellow Yellow    Clarity Urine Clear Clear    Spec Gravity 1.020 1.005 - 1.030    Glucose Urine Negative Negative mg/dL    Bilirubin Urine      Ketones Urine Negative Negative mg/dL    Blood Urine Negative Negative    pH Urine 6.0 5.0 - 8.0    Protein Urine 100 mg/dL (A) Negative mg/dL    Urobilinogen Urine >=8.0 (A) <2.0 mg/dL    Nitrite Urine Negative Negative    Leukocyte Esterase Urine Negative Negative   UA Microscopic only, urine    Collection Time: 11/19/24 12:26 PM   Result Value Ref Range    WBC Urine 1-5 0 - 5 /HPF    RBC Urine None Seen 0 - 2 /HPF    Bacteria Urine None Seen None Seen /HPF    Squamous Epi. Cells None Seen None Seen /HPF    Renal Tubular Epithelial Cells None Seen None Seen /HPF    Transitional Cells None Seen None Seen /HPF    Yeast Urine None Seen None Seen /HPF   Ictotest    Collection Time: 11/19/24 12:26 PM   Result Value Ref Range    Ictotest Negative Negative   Rapid SARS-CoV-2 by PCR    Collection Time: 11/19/24  1:56 PM    Specimen: Nares; Other   Result Value Ref Range    Rapid SARS-CoV-2 by PCR Not Detected Not Detected   Emergency MRSA Screen by PCR    Collection Time: 11/19/24  3:28 PM   Result Value Ref Range    MRSA Screen By PCR Negative Negative   Basic Metabolic Panel (8)    Collection Time: 11/19/24 11:12 PM   Result Value Ref Range    Glucose 114 (H) 70 - 99 mg/dL    Sodium 137 136 - 145 mmol/L    Potassium 3.9 3.5 - 5.1 mmol/L    Chloride 104 98 - 112 mmol/L    CO2 28.0 21.0 - 32.0 mmol/L    Anion Gap 5 0 - 18 mmol/L    BUN 11 9 - 23 mg/dL    Creatinine 0.63 0.55 - 1.02 mg/dL    Calcium, Total 8.3 (L) 8.7 - 10.4 mg/dL    Calculated Osmolality 284 275 - 295 mOsm/kg    eGFR-Cr 125 >=60 mL/min/1.73m2   CBC, Platelet; No Differential    Collection Time: 11/19/24 11:12 PM   Result Value Ref Range    WBC 16.1 (H) 4.0 - 11.0 x10(3) uL    RBC 2.68 (L) 3.80 - 5.30 x10(6)uL    HGB 8.6 (L) 12.0 - 16.0 g/dL     HCT 25.8 (L) 35.0 - 48.0 %    .0 (L) 150.0 - 450.0 10(3)uL    MCV 96.3 80.0 - 100.0 fL    MCH 32.1 26.0 - 34.0 pg    MCHC 33.3 31.0 - 37.0 g/dL    RDW 14.3 %   PTT, Activated    Collection Time: 11/19/24 11:12 PM   Result Value Ref Range    PTT 28.4 23.0 - 36.0 seconds   CBC With Differential With Platelet    Collection Time: 11/20/24  3:32 AM   Result Value Ref Range    WBC 14.8 (H) 4.0 - 11.0 x10(3) uL    RBC 2.55 (L) 3.80 - 5.30 x10(6)uL    HGB 8.0 (L) 12.0 - 16.0 g/dL    HCT 24.9 (L) 35.0 - 48.0 %    .0 150.0 - 450.0 10(3)uL    MCV 97.6 80.0 - 100.0 fL    MCH 31.4 26.0 - 34.0 pg    MCHC 32.1 31.0 - 37.0 g/dL    RDW 14.3 %    Neutrophil Absolute Prelim 11.86 (H) 1.50 - 7.70 x10 (3) uL    Neutrophil Absolute 11.86 (H) 1.50 - 7.70 x10(3) uL    Lymphocyte Absolute 1.14 1.00 - 4.00 x10(3) uL    Monocyte Absolute 1.56 (H) 0.10 - 1.00 x10(3) uL    Eosinophil Absolute 0.09 0.00 - 0.70 x10(3) uL    Basophil Absolute 0.03 0.00 - 0.20 x10(3) uL    Immature Granulocyte Absolute 0.09 0.00 - 1.00 x10(3) uL    Neutrophil % 80.3 %    Lymphocyte % 7.7 %    Monocyte % 10.6 %    Eosinophil % 0.6 %    Basophil % 0.2 %    Immature Granulocyte % 0.6 %   Comp Metabolic Panel (14)    Collection Time: 11/20/24  3:32 AM   Result Value Ref Range    Glucose 114 (H) 70 - 99 mg/dL    Sodium 135 (L) 136 - 145 mmol/L    Potassium 3.8 3.5 - 5.1 mmol/L    Chloride 105 98 - 112 mmol/L    CO2 28.0 21.0 - 32.0 mmol/L    Anion Gap 2 0 - 18 mmol/L    BUN 10 9 - 23 mg/dL    Creatinine 0.62 0.55 - 1.02 mg/dL    Calcium, Total 8.1 (L) 8.7 - 10.4 mg/dL    Calculated Osmolality 280 275 - 295 mOsm/kg    eGFR-Cr 126 >=60 mL/min/1.73m2    AST 12 <34 U/L    ALT <7 (L) 10 - 49 U/L    Alkaline Phosphatase 97 37 - 98 U/L    Bilirubin, Total 0.8 0.3 - 1.2 mg/dL    Total Protein 5.5 (L) 5.7 - 8.2 g/dL    Albumin 3.1 (L) 3.2 - 4.8 g/dL    Globulin  2.4 2.0 - 3.5 g/dL    A/G Ratio 1.3 1.0 - 2.0   Potassium    Collection Time: 11/20/24  3:32 AM   Result  Value Ref Range    Potassium 3.8 3.5 - 5.1 mmol/L   PTT, Activated    Collection Time: 11/20/24  3:32 AM   Result Value Ref Range    PTT 81.6 (H) 23.0 - 36.0 seconds       Review of Imaging  none    Assessment/Plan:  RLE numbness/weakness  MRI L spine reviewed-no surgical pathology  MRI C spine and T spine pending  Agree with thrombectomy  Will follow    Jose Enrique Garcia DO    11/20/2024  9:34 AM
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
## 2024-11-20 NOTE — CM/SW NOTE
Received call from unit that pt has been accepted at [facility] to their MICU Room #975.  Called [facility] transfer center to verify.  Mariam stated  that the pt has been accepted by Dr Hylton, but she needs the accepting and ICU MD to talk.  [facility] called and placed on will call for transport.
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
## 2024-11-20 NOTE — PROGRESS NOTES
[facility]   part of [facility]     Hospitalist Progress Note     Carmela Bell Patient Status:  Inpatient    2000 MRN [id number]   Location [facility] 4SW-A Attending Doug Castaneda MD   Hosp Day # 1 PCP Deven Sharif DO     Chief Complaint: Extensive bilateral lower extremity DVT      Subjective:     Patient in significant pain and swelling in bilateral lower extremities, no abdominal pain,Has had fevers of 102,    Worsening swelling in the right lower extremities greater than left lower extremity along with some hyperemia, is not short of breath no chest pain    Objective:    Review of Systems:   A comprehensive review of systems was completed; pertinent positive and negatives stated in subjective.    Vital signs:  Temp:  [98.5 °F (36.9 °C)-102 °F (38.9 °C)] 99.8 °F (37.7 °C)  Pulse:  [] 111  Resp:  [14-25] 21  BP: ()/(54-85) 104/64  SpO2:  [91 %-100 %] 99 %  AO: ()/(54-67) 96/65    Physical Exam:      General:  Alert, no distress, appears stated age. Tired   Head:  Normocephalic, without obvious abnormality, atraumatic.   Eyes:  Sclera anicteric, No conjunctival pallor, EOMs intact.    Nose: Nares normal. Septum midline. Mucosa normal. No drainage.   Throat: Lips, mucosa, and tongue normal. Teeth and gums normal.   Neck: Supple, symmetrical, trachea midline, no cervical or supraclavicular lymph adenopathy, thyroid: no enlargment/tenderness/nodules appreciated   Lungs:   Clear to auscultation bilaterally. Normal effort   Chest wall:  No tenderness or deformity.   Heart:  Regular rate and rhythm, S1, S2 normal, no murmur, rub or gallop appreciated   Abdomen:   Soft, non-tender. Bowel sounds normal. No masses,  No organomegaly. Non distended   Extremities: Bilateral 3+ pitting edema, right greater than left, significant tenderness to palpation,   Skin: Skin color, texture, turgor normal. No rashes or lesions.    Neurologic: Normal strength, RLE 3/5 strength         Diagnostic Data:    Labs:  Recent Labs   Lab 11/18/24  1343 11/19/24  1202 11/19/24  2312 11/20/24  0332   WBC 9.9 18.9* 16.1* 14.8*   HGB 10.4* 9.5* 8.6* 8.0*   MCV 99.1 96.6 96.3 97.6   .0 201.0 149.0* 155.0   INR 1.07  --   --   --        Recent Labs   Lab 11/18/24  1343 11/19/24  1202 11/19/24  2312 11/20/24  0332   * 116* 114* 114*   BUN 9 12 11 10   CREATSERUM 0.79 0.83 0.63 0.62   CA 9.1 8.7 8.3* 8.1*   ALB 3.9 3.5  --  3.1*    135* 137 135*   K 3.1* 3.4* 3.9 3.8  3.8    100 104 105   CO2 31.0 28.0 28.0 28.0   ALKPHO 110* 101*  --  97   AST 17 19  --  12   ALT 7* <7*  --  <7*   BILT 0.8 1.4*  --  0.8   TP 7.0 6.3  --  5.5*       Estimated Creatinine Clearance: 143.7 mL/min (based on SCr of 0.62 mg/dL).    Recent Labs   Lab 11/19/24  1202   TROPHS 3       Recent Labs   Lab 11/18/24  1343   PTP 14.1   INR 1.07                  Microbiology    Hospital Encounter on 11/19/24   1. Blood Culture     Status: None (Preliminary result)    Collection Time: 11/19/24 12:25 PM    Specimen: Blood,peripheral   Result Value Ref Range    Blood Culture Result No Growth 1 Day N/A         Imaging: Reviewed in Epic.    Medications:    lidocaine-menthol  1 patch Transdermal Daily    docusate sodium  100 mg Oral BID    sennosides  8.6 mg Oral BID    cefepime  1 g Intravenous Q8H    vancomycin  1,750 mg Intravenous Q12H       Assessment & Plan:        Sepsis POA, unclear etiology, discitis? Infected pelvic hematoma? High risk for bacteremia given multiple IV exposures through her healthcare visits over the past month   - cont BS abx, vanc/zosyn changed to Vanco and cefepime continues to spike fever of 102  -Blood cultures no growth x 1 day  - IVF resuscitation  - goal MAP>65, pressors per ICU  - intensivist, ID consulted  - elevated inflammatory markers from above    # Extensive bilateral lower extremity DVT extending to above the IVC filter  # Prior history of provoked large right-sided PE until    # Developed right lower extremity DVT on 10/26/2024-started Lovenox  # Recent admission on 10/31/2024 with pelvic hematoma and rectus sheath hematoma after A-daafjca-wyykcbvfkxauqms held and IVC filter placed on 2024  # Now admitted again for extensive bilateral lower extremity DVT right worse than left  -Started back on heparin drip, iron studies, hematology consulted,  -Vascular surgery consulted, plan for thrombectomy as early as possible tomorrow if patient has not been transferred to tertiary care center yet  -Discussed with mom and patient at bedside  -Cardiology also consulted       # Pelvic hematoma   -General Surgery was consulted and did not recommend any aspiration of the pelvic hematoma at this time  -Recommend evacuation of the clot possible transfer to tertiary care center    RLE weakness  - spinal stenosis on MRI  - Spine team following, await further recs  -Awaiting MRI cervical and thoracic spine, lumbar spine reviewed  -  R hydronephrosis  - hematoma causing ureteral compression  - consult urology  -Recommend thrombectomy possible transfer to tertiary care center  -Already on antibiotics, delayed emptying of the right kidney secondary to hematoma     Chronic anemia  - recent postop hemorrhage  - stable  - monitor   -Continue to monitor on heparin drip,     S/p  delivery  - OB following     Chronic:  Asthma  Depression/anxiety  Hypothyroidism  Hx MARYSOL    Discussed this extensively with patient's intensivist as well as other physicians, at this time transfer to tertiary [facility] has been initiated, awaiting bed placement at [facility] once patient has a bed patient can likely be transferred to tertiary [facility] discussed this with patient's mom as well as patient at bedside.  Dr Mi spoke with intensivisit at [facility], awaiting bed   Alejandromargareth Taylor MD    > 60 mins of prolonged service, in coordinating with vascular surgery, intensivist and bedboard coordinator  for transfre to [facility], patient eventually transferred     Supplementary Documentation:     Quality:  DVT Mechanical Prophylaxis:   SCDs,    DVT Pharmacologic Prophylaxis   Medication    heparin (Porcine) 18619 units/250mL infusion PE/DVT/THROMBUS CONTINUOUS                Code Status: Full Code  Cantrell: Cantrell catheter in place  Cantrell Duration (in days): 1  Central line:    LORI:     The 21st Century Cures Act makes medical notes like these available to patients in the interest of transparency. Please be advised this is a medical document. Medical documents are intended to carry relevant information, facts as evident, and the clinical opinion of the practitioner. The medical note is intended as peer to peer communication and may appear blunt or direct. It is written in medical language and may contain abbreviations or verbiage that are unfamiliar.

## 2024-11-20 NOTE — PROGRESS NOTES
[facility] Pharmacy Dosing Service      Initial Pharmacokinetic Consult for Vancomycin Dosing     Carmela Bell is a 24 year old female who is being initiated on vancomycin therapy for sepsis.  Pharmacy has been asked to dose vancomycin by Dorinda Das APRN.  The initial treatment and monitoring approach will be steady state AUC strategy.        Weight and Temperature:    Wt Readings from Last 1 Encounters:   24 126.4 kg (278 lb 10.6 oz)        Temp Readings from Last 1 Encounters:   24 99.3 °F (37.4 °C) (Temporal)      Labs:   Recent Labs   Lab 24  1343 24  1202   CREATSERUM 0.79 0.83      Estimated Creatinine Clearance: 107.3 mL/min (based on SCr of 0.83 mg/dL).     Recent Labs   Lab 24  1343 24  1202   WBC 9.9 18.9*          The Pharmacokinetic Target is:     to 600 mg-h/L and trough <=15 mg/L    Renal Dosing Considerations:    TIERA/ARF??- Hydronephrosis with hx of retroperitoneal hematoma , last admission Scr 0.3 to 0.4.      Assessment/Plan:   Initial/Loading dose: Has received 2250 mg IV (25 mg/kg, capped at 2250 mg) x 1 initial dose.      Maintenance dose: Pharmacy will dose vancomycin at 1750 mg IV every 12 hours     Monitorin) Plan for vancomycin peak and trough to be obtained at steady state    2) Pharmacy will order SCr as clinically indicated to assess renal function.    3) Pharmacy will monitor for toxicity and efficacy, adjust vancomycin dose and/or frequency, and order vancomycin levels as appropriate per the Pharmacy and Therapeutics Committee approved protocol until discontinuation of the medication.       We appreciate the opportunity to assist in the care of this patient.     Alka Flower, PharmD  2024  8:00 PM  [facility]  Pharmacy Extension: [phone]
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
## 2024-11-20 NOTE — PLAN OF CARE
Assumed care of patient at approximately 1530. Received patient resting in bed on RA.   A&Ox4.   RA  T max 99.3. NSR/ST on monitor.   No BM. Patient reports diarrhea, stool sample needed to r/o C. Diff. [place].   Patient c/o generalized pain, prn meds given, endorsed to oncoming RN.   See flowsheet/MAR.

## 2024-11-20 NOTE — PLAN OF CARE
Received patient after RN shift report. Alert and oriented. On room air. Tamia and Kate started. Complaints of pain and a fever during the shift. Strict bedrest per MD. NPO after midnight. See MAR and flowsheets for more information. Family at bedside and updated on plan of care.

## 2024-11-20 NOTE — CONSULTS
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 1 cm in thickness.  No significant atherosclerotic calcifications of the coronary vessels.  PLEURA:  No mass or effusion.  CHEST WALL:  No mass or axillary adenopathy.  LIMITED ABDOMEN:  Limited images of the upper abdomen are unremarkable.  BONES:  No bony lesion or fracture.  OTHER:  Negative.             CONCLUSION:  1. No evidence of pulmonary embolism or acute intrathoracic process. 2. Cardiomegaly and trace pericardial effusion. 3. The preliminary report was reviewed.    LOCATION:  [place]   Dictated by (CST): Blake Nieto DO on 2024 at 7:06 AM     Finalized by (CST): Blake Nieto DO on 2024 at 7:10 AM       US VENOUS DOPPLER LEG LEFT - DIAG IMG (CPT=93971)    Result Date: 10/31/2024  PROCEDURE:  US VENOUS DOPPLER LEG LEFT - DIAG IMG (CPT=93971)  COMPARISON:  None.  INDICATIONS:  edema,e gabby for DVT  TECHNIQUE:  Real time, grey scale, and duplex ultrasound was used to evaluate the lower extremity venous system. B-mode two-dimensional images of the vascular structures, Doppler spectral analysis, and color flow.  Doppler imaging were  performed.  The following veins were imaged:  Common, deep, and superficial femoral, popliteal, sapheno-femoral junction, posterior tibial veins, and the contralateral common femoral vein.  PATIENT STATED HISTORY: (As transcribed by Technologist)  Patient states she is having right leg pain and swelling, doesn't feel as swollen on the left leg.    FINDINGS:  EXTREMITY EXAMINED:  Left lower extremity SAPHENOFEMORAL JUNCTION:  No reflux. THROMBI:  None visible. COMPRESSION:  Normal compressibility, phasicity, and augmentation. OTHER:  Negative.            CONCLUSION:  No evidence of DVT in the left lower extremity.   LOCATION:  [id number]    Dictated by (CST): Bari Neal MD on 10/31/2024 at 7:27 PM     Finalized by (CST): Bari Neal MD on 10/31/2024 at 7:28 PM       CTA ABD/PEL (CPT=74174)    Result Date: 10/31/2024  PROCEDURE:  CTA ABD/PEL (CPT=74174)  COMPARISON:  [facility] , CT, CT ABDOMEN+PELVIS(CONTRAST ONLY)(CPT=74177), 10/31/2024, 11:35 AM.  INDICATIONS:  Pelvic hematoma rule out active extravasation  TECHNIQUE:  CT images of the abdomen and pelvis were obtained pre- and post- injection of non-ionic intravenous contrast material. Multi-planar reformatted/3-D images were created to optimize visualization of vascular anatomy.  Dose reduction techniques were used. Dose information is transmitted to the ACR (American College of Radiology) NRDR (National Radiology Data Registry) which includes the Dose Index Registry.  PATIENT STATED HISTORY:(As transcribed by Technologist)   Pelvic hematoma rule out active extravasation. Extreme lower quadrant abdominal pain and back pain.   CONTRAST USED:  100cc of Isovue 370  FINDINGS:  AORTA/VASCULAR:  No aneurysm or dissection.  Normal renal and mesenteric vessels.  There are 2 right renal arteries incidentally noted. LIVER:  No enlargement, atrophy, abnormal density, or significant focal lesion.  BILIARY:  No ductal dilatation.  Status post cholecystectomy. PANCREAS:   No lesion, fluid collection, ductal dilatation, or atrophy.  SPLEEN:  There is a small amount of perisplenic ascites.  No splenic mass or enlargement. KIDNEYS:  There is a delayed dense right nephrogram.  There is excretion of contrast into the right renal collecting system although this is delayed and diminished compared to the left.  There is bilateral hydronephrosis and hydroureter.  There is bilateral distal ureteral narrowing secondary to large pelvic hematoma.  ADRENALS:  No mass or enlargement.  RETROPERITONEUM:  No adenopathy.  BOWEL/MESENTERY:  Sigmoid colon is displaced by changes related to a large pelvic hematoma.  There is a moderate amount of gas and stool seen within the ascending, transverse and descending colon.  Some of the distention could reflect changes related to extrinsic mass effect.  There is fluid identified in the bilateral pericolic gutters extending up to the left upper quadrant adjacent to the spleen.  The fluid in the right pericolic gutter only extends into the lower abdomen.  URINARY BLADDER:  The urinary bladder is markedly effaced and displaced to the left by large pelvic hematoma.  There is contrast within the bladder.  There is no contrast extravasation seen. PELVIC NODES:  No enlarged pelvic lymph nodes are seen. PELVIC ORGANS:  There is a large complex hematoma within the pelvis with fluid fluid levels.  The large pelvic hematoma displaces the uterus, urinary bladder and sigmoid colon to the left.  There is marked effacement of the urinary bladder.  The uterus is enlarged consistent with recent postpartum state.  Mildly prominent uterine vascularity noted consistent with recent postpartum state.  The intraperitoneal portion of the hematoma measures approximately 13.2 x 11.1 cm which is not significantly changed compared to the recent previous exam performed at 1135 hours.  There is also a rectus sheath component of the hematoma which measures approximately 12.5 x 4.6 cm which  is not significantly changed from the previous exam either.  No active contrast extravasation is appreciated in either collections. BONES:  No bony lesion or fracture.  LUNG BASES:  No visible pulmonary or pleural disease.  OTHER:  Negative.             CONCLUSION:  1. Stable large pelvic hematoma with fluid- fluid hematocrit levels noted unchanged from a previous recent CT performed at 1135 hours on 10/31/2024.  There is also a stable large rectus sheath hematoma.  There is no contrast extravasation identified within the pelvic or abdominal wall hematomata.  No evidence for active arterial hemorrhage. 2. There is resultant mass effect upon the pelvic structures.  There is moderate bilateral hydronephrosis and hydroureter secondary to the mass effect upon the pelvic structures.  The urinary bladder is markedly effaced and displaced to the left.  There is a delayed dense right nephrogram with delayed excretion into the right renal collecting system and ureter suggesting significant obstruction.  3. Changes of recent postpartum state noted. 4. Please see above for details.    LOCATION:  [facility]   Dictated by (CST): Keshawn Michael MD on 10/31/2024 at 1:20 PM     Finalized by (CST): Keshawn Michael MD on 10/31/2024 at 1:42 PM       CT ABDOMEN+PELVIS(CONTRAST ONLY)(CPT=74177)    Result Date: 10/31/2024  PROCEDURE:  CT ABDOMEN+PELVIS (CONTRAST ONLY) (CPT=74177)  COMPARISON:  [facility] , CT, CT ABDOMEN PELVIS IV CONTRAST, NO ORAL (ER), 2023, 6:52 PM.  INDICATIONS:  Abdominal pain status post   TECHNIQUE:  CT scanning was performed from the dome of the diaphragm to the pubic symphysis with non-ionic intravenous contrast material. Post contrast coronal MPR imaging was performed.  Dose reduction techniques were used. Dose information is transmitted to the ACR (American College of Radiology) NRDR (National Radiology Data Registry) which includes the Dose Index Registry.  PATIENT STATED HISTORY:(As transcribed  by Technologist)  Patient states abdominal pain status post .   CONTRAST USED:  100cc of Isovue 370  FINDINGS:  LUNG BASES:  Dependent atelectasis bilaterally. LIVER:  Normal in shape and contour. BILIARY:  No intrahepatic biliary dilatation.. SPLEEN:  Normal.  No enlargement or focal lesion. PANCREAS:  No jenny-pancreatic inflammatory stranding ADRENALS:  Normal.  No mass or enlargement.  KIDNEYS:  Mild right-sided hydronephrosis and right ureteral dilatation. BOWEL/MESENTERY:  Bowel is normal in caliber. No evidence of obstruction.  There is fluid and stranding noted within the lower abdomen. PELVIS:  There is complex hematoma within the pelvis with fluid fluid levels measuring 13.8 x 10.5 x 12.5 cm.  Calcified phleboliths within the pelvis.  Postpartum uterus.  Free pelvic fluid.  There is fluid collection within the right rectus muscle measuring approximately 4.7 x 1.6 cm which likely represents hematoma. AORTA/VASCULAR:  Aorta is normal in caliber. BONES:  No acute fractures.            CONCLUSION:  1. Large pelvic hematoma measuring 13.8 x 10.5 x 12.5 cm with fluid fluid levels.  If clinical concern for active extravasation, consider CTA.  This hematoma causes mass effect on the bladder and uterus. 2. Mild bilateral hydronephrosis hydronephrosis the right greater than left. 3. Postpartum uterus. 4. There is fluid noted within the inferior right rectus muscle which may represent hematoma as well.    LOCATION:  [facility]   Dictated by (CST): Dory Denny MD on 10/31/2024 at 12:07 PM     Finalized by (CST): Dory Denny MD on 10/31/2024 at 12:13 PM       US VENOUS DOPPLER LEG RIGHT - DIAG IMG (CPT=93971)    Result Date: 10/31/2024  PROCEDURE:  US VENOUS DOPPLER LEG RIGHT - DIAG IMG (CPT=93971)  COMPARISON:  [facility], US VENOUS DOPPLER LEG RIGHT - DIAG IMG (CPT=93971), 3/06/2018, 11:26 PM.  INDICATIONS:  Right lower extremity edema  TECHNIQUE:  Real time, grey scale, and duplex ultrasound was used  to evaluate the lower extremity venous system. B-mode two-dimensional images of the vascular structures, Doppler spectral analysis, and color flow.  Doppler imaging were performed.  The following veins were imaged:  Common, deep, and superficial femoral, popliteal, sapheno-femoral junction, posterior tibial veins, and the contralateral common femoral vein.  PATIENT STATED HISTORY: (As transcribed by Technologist)  Patient states she has right lateral leg pain and numbness.    FINDINGS:  EXTREMITY EXAMINED:  Right lower extremity SAPHENOFEMORAL JUNCTION:  No reflux. THROMBI:  None visible. COMPRESSION:  Normal compressibility, phasicity, and augmentation. OTHER:  Negative.            CONCLUSION:  No evidence of deep vein thrombus in the right lower extremity.   LOCATION:  [facility]    Dictated by (CST): True Wallace MD on 10/31/2024 at 10:53 AM     Finalized by (CST): True Wallace MD on 10/31/2024 at 10:55 AM       US VENOUS DOPPLER LEG BILAT - DIAG IMG (CPT=93970)    Result Date: 10/27/2024  PROCEDURE:  US VENOUS DOPPLER LEG BILAT - DIAG IMG (CPT=93970)  COMPARISON:  None.  INDICATIONS:  h/o DVT, recent postpartum, right calf pain  TECHNIQUE:  Real time, grey scale, and duplex ultrasound was used to evaluate the lower extremity venous system. B-mode two-dimensional images of the vascular structures, Doppler spectral analysis, and color flow.  Doppler imaging were performed.  The following veins were imaged bilaterally:  Common, deep, and superficial femoral, popliteal, sapheno-femoral junction, and posterior tibial veins.  PATIENT STATED HISTORY: (As transcribed by Technologist)  history of deep venous thrombosis,recent post pardom, right calf pain and swelling    FINDINGS:  SAPHENOFEMORAL JUNCTION:  No reflux. THROMBI:  There is complete thrombus 1 of the paired proximal right posterior tibial veins consistent with DVT.  There is no evidence of DVT in the left leg. COMPRESSION:  Normal compressibility, phasicity,  and augmentation left leg.            CONCLUSION:  There is complete DVT in 1 of the paired proximal right posterior tibial veins.  Left leg is negative for DVT.  Critical exam results phoned to nurse Beverly on 10/27/2024 at 1457 hours with read back.   LOCATION:  [facility]   Dictated by (CST): Suma Keith MD on 10/27/2024 at 2:57 PM     Finalized by (CST): Suma Keith MD on 10/27/2024 at 2:58 PM            ASSESSMENT AND PLAN:     The patient is a 24 year old female who presents with extensive bilateral lower extremity DVT including her IVC filter. Discussed with patient and her mother. She will need bilateral lower extremity thrombectomy with IVC filter removal. She will need to stay on a heparin drip and will be anticoagulated afterwards as well. We will coordinate timing with the cath lab and anesthesia. Ok for diet today after her other procedures.     The patient indicated an understanding of these issues and agreed to the plan and all questions were answered.     Thank you for allowing me to participate in the patient's care.     Sincerely,  Essie Bee MD  11/20/24   11:21 AM                     [1]   Current Facility-Administered Medications on File Prior to Encounter   Medication Dose Route Frequency Provider Last Rate Last Admin    [COMPLETED] potassium chloride (Klor-Con M20) tab 40 mEq  40 mEq Oral Once Maurice Landaverde MD   40 mEq at 11/18/24 1720    [COMPLETED] iopamidol 76% (ISOVUE-370) injection for power injector  100 mL Intravenous ONCE PRN Maurice Landaverde MD   100 mL at 11/18/24 1656    [COMPLETED] morphINE PF 4 MG/ML injection 4 mg  4 mg Intravenous Once Maurice Landaverde MD   4 mg at 11/18/24 1720    [COMPLETED] gadoterate meglumine (Dotarem) 10 MMOL/20ML injection 20 mL  20 mL Intravenous ONCE PRN Maurice Landaverde MD   20 mL at 11/18/24 1949    [COMPLETED] ondansetron (Zofran) 4 MG/2ML injection 4 mg  4 mg Intravenous Once Maurice Landaverde MD   4 mg at 11/18/24 1958    [COMPLETED] magnesium oxide (Mag-Ox)  tab 400 mg  400 mg Oral Once Doug Castaneda MD   400 mg at 11/04/24 0840    [COMPLETED] magnesium oxide (Mag-Ox) tab 800 mg  800 mg Oral Once Kodak Santoyo MD   800 mg at 11/03/24 0906    [COMPLETED] magnesium oxide (Mag-Ox) tab 400 mg  400 mg Oral Once Nazia Garg MD   400 mg at 11/02/24 0814    [COMPLETED] iopamidol 76% (ISOVUE-370) injection for power injector  100 mL Intravenous ONCE PRN Kash Colin MD   100 mL at 11/01/24 0233    [COMPLETED] magnesium sulfate 4 g/100mL IVPB premix 4 g  4 g Intravenous Once Kash Colin MD 50 mL/hr at 11/01/24 0554 4 g at 11/01/24 0554    [COMPLETED] sodium ferric gluconate (Ferrlecit) 125 mg in sodium chloride 0.9% 100mL IVPB premix  125 mg Intravenous Daily Lauren Fernández  mL/hr at 11/03/24 0547 125 mg at 11/03/24 0547    [COMPLETED] lidocaine (Xylocaine) 1 % injection             [COMPLETED] fentaNYL (Sublimaze) 50 mcg/mL injection             [COMPLETED] heparin (Porcine) 5000 UNIT/ML injection             [COMPLETED] midazolam (Versed) 2 MG/2ML injection             [COMPLETED] diphenhydrAMINE (Benadryl) 50 mg/mL  injection             [COMPLETED] fentaNYL (Sublimaze) 50 mcg/mL injection             [COMPLETED] midazolam (Versed) 2 MG/2ML injection             [COMPLETED] iohexol (OMNIPAQUE) 350 MG/ML injection 30 mL  30 mL Injection ONCE PRN Keshawn Michael MD   30 mL at 11/01/24 1624    [COMPLETED] sodium chloride 0.9 % IV bolus 500 mL  500 mL Intravenous Once Reggie Martel MD   Stopped at 10/31/24 1041    [COMPLETED] acetaminophen (Tylenol Extra Strength) tab 1,000 mg  1,000 mg Oral Once Reggie Martel MD   1,000 mg at 10/31/24 1114    [COMPLETED] iopamidol 76% (ISOVUE-370) injection for power injector  100 mL Intravenous ONCE PRN Reggie Martel MD   100 mL at 10/31/24 1144    [COMPLETED] HYDROcodone-acetaminophen (Norco) 5-325 MG per tab 1 tablet  1 tablet Oral Once Reggie Martel MD   1 tablet at 10/31/24 1157    [COMPLETED]  cefTRIAXone (Rocephin) 2 g in sodium chloride 0.9% 100 mL IVPB-ADDV  2 g Intravenous Once Reggie Martel MD   Stopped at 10/31/24 1410    [COMPLETED] protamine 10 mg/mL injection 50 mg  50 mg Intravenous Once Reggie Martel MD   50 mg at 10/31/24 1324    [COMPLETED] iopamidol 76% (ISOVUE-370) injection for power injector  100 mL Intravenous ONCE PRN Reggie Martel MD   100 mL at 10/31/24 1315    [COMPLETED] ondansetron (Zofran) 4 MG/2ML injection 4 mg  4 mg Intravenous Once Reggie Martel MD   4 mg at 10/31/24 1410    [COMPLETED] morphINE PF 4 MG/ML injection 2 mg  2 mg Intravenous Once Reggie Martel MD   2 mg at 10/31/24 1410    [COMPLETED] sodium chloride 0.9% infusion 100 mL  100 mL Intravenous Once Eliva Clay APRN 500 mL/hr at 10/31/24 1836 100 mL at 10/31/24 183    [COMPLETED] potassium chloride 40 mEq in 250mL sodium chloride 0.9% IVPB premix  40 mEq Intravenous Once Kash Colin MD 62.5 mL/hr at 10/31/24 2054 40 mEq at 10/31/24 2054    [COMPLETED] lactated ringers IV bolus 1,000 mL  1,000 mL Intravenous Once Wesley Barnard MD 1,000 mL/hr at 10/31/24 2054 1,000 mL at 10/31/24 2054    [COMPLETED] sodium chloride 0.9% infusion   Intravenous Once Wesley Barnard MD 10 mL/hr at 24 0000 New Bag at 24 0000    [COMPLETED] lactated ringers IV bolus 1,000 mL  1,000 mL Intravenous Once Shoshana Horvath MD 2,000 mL/hr at 10/25/24 0720 1,000 mL at 10/25/24 0720    [COMPLETED] acetaminophen (Tylenol Extra Strength) tab 1,000 mg  1,000 mg Oral Once Shoshana Horvath MD   1,000 mg at 10/25/24 0817    [COMPLETED] sodium citrate-citric acid (Bicitra) 500-334 MG/5ML oral solution 30 mL  30 mL Oral Once Shoshana Horvath MD   30 mL at 10/25/24 0817    [COMPLETED] ceFAZolin (Ancef) 3 g in sodium chloride 0.9% 100mL IVPB premix  3 g Intravenous Once Shoshana Horvath MD   3 g at 10/25/24 1009    [] ketorolac (Toradol) 30 MG/ML injection 30 mg  30 mg Intravenous Q6H  Shoshana Horvath MD   30 mg at 10/26/24 0531    [] oxyTOCIN in sodium chloride 0.9% (Pitocin) 30 Units/500mL infusion premix  62.5 herminio-units/min Intravenous Continuous Shoshana Horvath MD 62.5 mL/hr at 10/25/24 1231 62.5 herminio-units/min at 10/25/24 1231    [COMPLETED] ketorolac (Toradol) 30 MG/ML injection 30 mg  30 mg Intravenous Once Jamal Valles DO   30 mg at 10/25/24 1130    [COMPLETED] lactated ringers IV bolus 500 mL  500 mL Intravenous Once Mae Orr MD   Stopped at 10/15/24 1841    [COMPLETED] ondansetron (Zofran) 4 MG/2ML injection 8 mg  8 mg Intravenous Once Wesley Barnard MD   8 mg at 10/14/24 2340    [COMPLETED] lactated ringers IV bolus 1,000 mL  1,000 mL Intravenous Once Wesley Barnard MD   Stopped at 10/15/24 0000     Current Outpatient Medications on File Prior to Encounter   Medication Sig Dispense Refill    oxyCODONE HCl 5 MG Oral Cap Take 1 capsule (5 mg total) by mouth every 4 (four) hours as needed.      gabapentin 300 MG Oral Cap Take 1 capsule (300 mg total) by mouth 3 (three) times daily.      cyanocobalamin 1000 MCG Oral Tab Take 1 tablet (1,000 mcg total) by mouth daily. 90 tablet 0    folic acid 1 MG Oral Tab Take 1 tablet (1 mg total) by mouth daily. 90 tablet 0    prenatal vitamin with DHA 27-0.8-228 MG Oral Cap Take 1 capsule by mouth daily.      iron polysacch [id number]--0.025-1 MG Oral Cap Take 1 capsule by mouth daily. (Patient not taking: Reported on 2024)

## 2024-11-20 NOTE — CM/SW NOTE
Discussed during rounds. MD requesting a transfer to tertiary Spoke with Amy [phone] in transfer center.  Provided Dr. Mi contact number.     ROBERT Hillman RN,   [phone]

## 2024-11-20 NOTE — PROGRESS NOTES
ICU  Critical Care APRN Progress Note    NAME: Carmela Bell - ROOM: A9/A9 - MRN: [id number] - Age: 24 year old - :2000    Subjective:  No acute events overnight.  Patient with continued back pain, inability to lift legs, especially the right 2/2 pain.     OBJECTIVE  Vitals:  /74   Pulse 106   Temp 98.5 °F (36.9 °C) (Temporal)   Resp 19   Ht 175.3 cm (5' 9\")   Wt 283 lb 15.2 oz (128.8 kg)   SpO2 95%   Breastfeeding No   BMI 41.93 kg/m²                Physical Exam:    General Appearance: Alert, cooperative, no distress, appears stated age  Neck: No JVD, neck supple, no adenopathy, trachea midline.  Lungs: Clear to auscultation bilaterally, respirations unlabored  Heart: Regular rate and rhythm, S1 and S2 normal, no murmur, rub or gallop  Abdomen: Soft, non-tender, bowel sounds active all four quadrants, no masses, no organomegaly.  Incision intact with purulent drainage   Extremities: Extremities normal, atraumatic, no cyanosis,capillary refill <3 sec. Right leg edema without erythema or warmth.    Pulses: 2+ and symmetric all extremities  Skin: Skin color, texture, turgor normal for ethnicity, no rashes or lesions, warm and dry  Neurologic: CNII-XII intact, normal strength, weakness to RLE    Data this admission:  XR CHEST AP PORTABLE  (CPT=71045)    Result Date: 2024  CONCLUSION:  Borderline heart size. No active disease seen.   LOCATION:  [facility]      Dictated by (CST): Stanley Mondragon MD on 2024 at 12:38 PM     Finalized by (CST): Stanley Mondragon MD on 2024 at 12:39 PM       MRI SPINE LUMBAR (W+WO) (CPT=72158)    Result Date: 2024  CONCLUSION:  Infiltration of the epidural fat along the ventral portion of the spinal canal at L3-4 disc through S1 is noted.  There is associated severe spinal canal stenosis at L3-4.  Severe spinal canal stenosis at L4-5 is also suggested.  Enhancement  in this region is noted.  A loculated fluid collection is not  identified.  There may be a minimal hemorrhagic component to the infiltration of the fat in this region.  This critical result was discussed with Dr. Landaverde at 2002 hours on 11/18/2024. Read back was performed.    LOCATION:  [facility]      Dictated by (CST): Bari Neal MD on 11/18/2024 at 7:53 PM     Finalized by (CST): Bari Neal MD on 11/18/2024 at 8:05 PM       CT ABDOMEN+PELVIS(CONTRAST ONLY)(CPT=74177)    Result Date: 11/18/2024  CONCLUSION:   1. Mild to moderate right hydronephrosis is noted.  No secretion of contrast in the right renal collecting system on delayed images.  This likely due to obstruction caused by large pelvic hematoma.  2. The dominant large pelvic hematoma is morphologically stable.  3. The previously noted hemorrhage in the right pericolic gutter is now low-density.  This is likely due to expected evolution of hemorrhage in this region.    LOCATION:  [facility]   Dictated by (CST): Bari Neal MD on 11/18/2024 at 5:27 PM     Finalized by (CST): Bari Neal MD on 11/18/2024 at 5:33 PM       XR CHEST AP PORTABLE  (CPT=71045)    Result Date: 11/18/2024  CONCLUSION:  No active disease seen.   LOCATION:  [facility]      Dictated by (CST): Stanley Mondragon MD on 11/18/2024 at 1:44 PM     Finalized by (CST): Stanley Mondragon MD on 11/18/2024 at 1:44 PM       US VENOUS DOPPLER LEG BILAT - DIAG IMG (CPT=93970)    Result Date: 11/8/2024  CONCLUSION:  No DVT is visualized in either leg   LOCATION:  [facility]   Dictated by (CST): Suma Keith MD on 11/08/2024 at 8:22 PM     Finalized by (CST): Suma Keith MD on 11/08/2024 at 8:23 PM       US VENOUS DOPPLER LEG BILAT - DIAG IMG (CPT=93970)    Result Date: 11/4/2024  CONCLUSION:  No sign of DVT either leg.   LOCATION:  [facility]   Dictated by (CST): Stanley Mondragon MD on 11/04/2024 at 11:36 AM     Finalized by (CST): Stanley Mondragon MD on 11/04/2024 at 11:37 AM       IR IVC FILTER PROCEDURE    Result Date: 11/2/2024  CONCLUSION:  1. Normal  inferior vena cavogram. 2. Successful deployment of a Bard Mandy retrievable filter in the infrarenal inferior vena cava. 3. The clinical service managing the patient's anticoagulation should let the interventional Radiology service know as soon as possible when IVC filtration is no longer required so that the filter can be removed at that time.   LOCATION:  [facility]    Dictated by (CST): Keshawn Michael MD on 11/02/2024 at 0:32 AM     Finalized by (CST): Keshawn Michael MD on 11/02/2024 at 0:37 AM       CT ABDOMEN+PELVIS(CPT=74176)    Result Date: 11/1/2024  CONCLUSION:  1. The overall size and amount of hemorrhage within the pelvis does appear slightly decreased as compared to prior study.  Increasing density in the retroperitoneal part of the hemorrhage is noted which is probably evolution of hemorrhage and clot retraction.  2. This does cause significant mass effect and displacement of urinary bladder.   LOCATION:  [facility]   Dictated by (CST): Tee Tao MD on 11/01/2024 at 12:34 PM     Finalized by (CST): Tee Tao MD on 11/01/2024 at 12:42 PM       CTA CHEST (CPT=71275)    Result Date: 11/1/2024  CONCLUSION:  1. No evidence of pulmonary embolism or acute intrathoracic process. 2. Cardiomegaly and trace pericardial effusion. 3. The preliminary report was reviewed.    LOCATION:  [facility]   Dictated by (CST): Blake Nieto DO on 11/01/2024 at 7:06 AM     Finalized by (CST): Blake Nieto DO on 11/01/2024 at 7:10 AM       US VENOUS DOPPLER LEG LEFT - DIAG IMG (CPT=93971)    Result Date: 10/31/2024  CONCLUSION:  No evidence of DVT in the left lower extremity.   LOCATION:  [id number]    Dictated by (CST): Bari Neal MD on 10/31/2024 at 7:27 PM     Finalized by (CST): Bari Neal MD on 10/31/2024 at 7:28 PM       CTA ABD/PEL (CPT=74174)    Result Date: 10/31/2024  CONCLUSION:  1. Stable large pelvic hematoma with fluid- fluid hematocrit levels noted unchanged from a previous recent CT performed  at 1135 hours on 10/31/2024.  There is also a stable large rectus sheath hematoma.  There is no contrast extravasation identified within the pelvic or abdominal wall hematomata.  No evidence for active arterial hemorrhage. 2. There is resultant mass effect upon the pelvic structures.  There is moderate bilateral hydronephrosis and hydroureter secondary to the mass effect upon the pelvic structures.  The urinary bladder is markedly effaced and displaced to the left.  There is a delayed dense right nephrogram with delayed excretion into the right renal collecting system and ureter suggesting significant obstruction.  3. Changes of recent postpartum state noted. 4. Please see above for details.    LOCATION:  [facility]   Dictated by ([facility]): Keshawn Michael MD on 10/31/2024 at 1:20 PM     Finalized by ([facility]): Keshawn Michael MD on 10/31/2024 at 1:42 PM       CT ABDOMEN+PELVIS(CONTRAST ONLY)(CPT=74177)    Result Date: 10/31/2024  CONCLUSION:  1. Large pelvic hematoma measuring 13.8 x 10.5 x 12.5 cm with fluid fluid levels.  If clinical concern for active extravasation, consider CTA.  This hematoma causes mass effect on the bladder and uterus. 2. Mild bilateral hydronephrosis hydronephrosis the right greater than left. 3. Postpartum uterus. 4. There is fluid noted within the inferior right rectus muscle which may represent hematoma as well.    LOCATION:  [facility]   Dictated by ([facility]): Dory Denny MD on 10/31/2024 at 12:07 PM     Finalized by (CST): Dory Denny MD on 10/31/2024 at 12:13 PM       US VENOUS DOPPLER LEG RIGHT - DIAG IMG (CPT=93971)    Result Date: 10/31/2024  CONCLUSION:  No evidence of deep vein thrombus in the right lower extremity.   LOCATION:  [facility]    Dictated by ([facility]): True Wallace MD on 10/31/2024 at 10:53 AM     Finalized by ([facility]): True Wallace MD on 10/31/2024 at 10:55 AM       US VENOUS DOPPLER LEG BILAT - DIAG IMG (CPT=93970)    Result Date: 10/27/2024  CONCLUSION:  There is complete  DVT in 1 of the paired proximal right posterior tibial veins.  Left leg is negative for DVT.  Critical exam results phoned to nurse Beverly on 10/27/2024 at 1457 hours with read back.   LOCATION:  [facility]   Dictated by (CST): Suma Keith MD on 10/27/2024 at 2:57 PM     Finalized by (CST): Suma Keith MD on 10/27/2024 at 2:58 PM         Labs:  Lab Results   Component Value Date    WBC 14.8 11/20/2024    HGB 8.0 11/20/2024    HCT 24.9 11/20/2024    .0 11/20/2024    CREATSERUM 0.62 11/20/2024    BUN 10 11/20/2024     11/20/2024    K 3.8 11/20/2024    K 3.8 11/20/2024     11/20/2024    CO2 28.0 11/20/2024     11/20/2024    CA 8.1 11/20/2024    ALB 3.1 11/20/2024    ALKPHO 97 11/20/2024    BILT 0.8 11/20/2024    TP 5.5 11/20/2024    AST 12 11/20/2024    ALT <7 11/20/2024    PTT 81.6 11/20/2024       Assessment/Plan:    Sepsis with hypotension  - Vasopressor for systolic >90 as needed.   -Continue Vanc and Cefepime  -Consult ID  -Consult OB-Gyne for incision  -Consult wound care  -Consult surgery for intraabdominal process    Right Leg weakness/Swelling with MRI findings of stenosis  -Consult Neurosurgery  -Consider steroids  -Neuro checks q4h.  -Bilateral LE US with multiple DVT    DVTs throughout BLE with clot on IVC filter  -Vascular surgery following  -heparin drip  -Consult hematology    Hydronephrosis with hx of retroperitoneal hematoma  -Consult Urology  -continue pritchard     Hypothyroidism  Depression  -Continue home medications    F/E/N  -General diet  -Replete electrolytes as needed.    Proph  -Heparin drip  -SCDs  -Bowel Regimen    Dispo  -Full Code  -ICU risk for deterioration, consider transfer to tertiary care center      Plan of care discussed with intensivist on-call Dr. Mi.      AZEB Fermin-BC  Critical Care NP  Phone [phone]

## 2024-11-20 NOTE — PLAN OF CARE
Pt A&O x4. VS wnl. On Heparin 2300u/hr, PTT @ 1130 122. Dilaudid 0.4mg given q2 hr for bilat lower ext pain. Right radial rena. Pt transferred to [facility] today.

## 2024-11-20 NOTE — PROGRESS NOTES
I was called with an ultrasound result which has bilateral extensive DVTs from the IVC filter down and all the deep venous system.  In addition patient has IVC thrombus even thrombus above the inferior vena cava.  Long discussions were had with cardiology on-call obstetrics on-call along with the vascular surgeon who I spoke to extensively about further measures.  The initial  was done on  and for all intents and purposes based on imaging from yesterday the patient has a stable hematoma.  Given the duration and time from operative procedure given the patient's extensive DVT including above the inferior vena cava filter the patient is at high risk for thromboembolic event/pulmonary embolism.  Patient previously has had a PE postoperatively as well.  At this juncture given risk versus benefit in this young female we will go ahead and start heparin drip without bolus.  Will watch her clinically we will place an arterial line and if the patient has subsequent hypotension we will do serial H&H monitoring and possibly repeat CT.  I think given the likely hemostasis from surgery 3 weeks ago hopefully bleeding will be an issue.  Risk-benefit again favors anticoagulation.  I spoke extensively to the vascular surgeon about possible catheter guided thrombolysis versus thrombectomy and given the recent hematoma his expert opinion is thrombolysis likely is contraindicated at this point but the possibility of thrombectomy above the filter and filter removal is a distinct consideration.  He will see the patient tomorrow and is planning on potentially moving forward with operative intervention/thrombectomy after reviewing candidacy.  Explained this at length to the patient as well as the family.  Bedside echo done RV appears to be quite functional and without signs of systolic dysfunction if the patient has a clinically insignificant PE there is always a possibility currently she is on room air.  Further  discussion is going to need to be had about possible aspiration of pelvic hematoma and the risk-benefit in the setting of anticoagulation.  Given that pulmonary embolism is her imminent life threat multidisciplinary discussion is going to be needed with IR OB/GYN etc.    An additional 45 minutes critical care time spent with this patient with respect to management of extensive DVT date of service 11/19/2024

## 2024-11-21 LAB
APTT PPP: 114.2 SECONDS (ref 23–36)
INR BLD: 1.32 (ref 0.85–1.16)
LA 3 SCREEN W REFLEX-IMP: POSITIVE
PROTHROMBIN TIME: 16.5 SECONDS (ref 11.6–14.8)
SCREEN DRVVT: 1.28 S (ref 0–1.29)
SCREEN DRVVT: NEGATIVE S
STACLOT LA DELTA: 9.4 SECONDS (ref ?–8)

## 2024-11-22 NOTE — PAYOR COMM NOTE
Discharge Notification    Patient Name: TIFFANIE GASCA  Payor: MARIANELA LEARY  Subscriber #: [id number]  Authorization Number: [id number]  Admit Date/Time: 11/19/2024 11:46 AM  Discharge Date/Time: 11/20/2024 5:13 PM

## 2024-11-23 LAB
B2 GLYCOPROT1 IGG SERPL IA-ACNC: 1 U/ML (ref ?–7)
B2 GLYCOPROT1 IGM SERPL IA-ACNC: <2.4 U/ML (ref ?–7)
CARDIOLIPIN IGG SERPL-ACNC: 0.9 GPL (ref ?–10)
CARDIOLIPIN IGM SERPL-ACNC: 1.4 MPL (ref ?–10)

## 2024-11-27 ENCOUNTER — APPOINTMENT (OUTPATIENT)
Dept: HEMATOLOGY/ONCOLOGY | Facility: HOSPITAL | Age: 24
End: 2024-11-27
Attending: INTERNAL MEDICINE
Payer: COMMERCIAL

## 2024-12-13 RX ORDER — ENOXAPARIN SODIUM 100 MG/ML
1 INJECTION SUBCUTANEOUS 2 TIMES DAILY
COMMUNITY
End: 2024-12-20

## 2024-12-13 RX ORDER — AZITHROMYCIN 250 MG/1
250 TABLET, FILM COATED ORAL DAILY
Status: ON HOLD | COMMUNITY
End: 2024-12-18 | Stop reason: ALTCHOICE

## 2024-12-13 NOTE — PAT NURSING NOTE
APPENDUM: Patient states that Dr. Durbin will be switching her from Lovenox injections to Eliquis starting tomorrow. I spoke with Irene from Dr. Koroma's office to notify of change. Patient instructed to hold Eliquis for 48 hours prior to procedure, last dose Monday morning 12/16 am    Pre-Procedure Instructions     Pre-Procedure Instructions: Encouraged to check in electronically via [employer]     Visitor Instructions     Adult Patients: 2 [facility] can accompany the patient day of procedure. 2 [facility] may visit [phone] during inpatient stay     Pre-Op Instructions     Scheduled Surgery: You are scheduled for Vascular Surgery     Date of Procedure: 12/18/24     Diet Instructions: Do not eat or drink after 10pm. This includes water, gum, candy and food.     Medication Instructions: Do not take any medications the morning of your surgery.     Do not take the following Blood Thinner(s): Hold/do not take Lovenox injection morning of procedure, take last dose night before on 12/17 pm     Medications to Stop: Last dose of vitamins, supplements, and NSAID's (ex. Ibuprofen, Excederin, Aleve, Diclofenac, and any gels having steroids) 7 days prior to surgery (Do not count the date of surgery)     Skin Prep: Shower with Dial Soap the morning of your surgery (or any antibacterial soap).     Sleep Apnea: If you have sleep apnea, please bring your mask and tubing     On the day of your procedure please make sure to arrive at: 0630 am     This is going to be a tentative time of arrival for surgery. We will call you the buisness day prior to your surgery in the late afternoon to reconfirm your arrival. Please check your voicemail for a message.     Admit/Discharge Status: You will be admitted to the hospital after surgery.     Discharge Teaching: Your nurse will give you specific instructions before discharge.;Any questions, please call the surgeon's office     Additional Instructions: Bring insurance card(s) and  picture ID;Leave all valuables at home, including jewelry;Wear appropriate clothing;No contacts, wear glasses;Bring brace/immobilizer/crutches/walker;Bring C-Pap tubing and mask;Urine sample for women of child bearing age;You'll receive arrival time 1 business day prior to scheduled surgery     Park in the [place] parking lot.  Check in at the [facility] reception desk. Our  will be there to check you in for your procedure. Please bring your insurance cards and ID with you.  parking is available starting at 6 am        If you are scheduled to arrive early for 5:30 am, the [facility] reception desk does not open till 5:30 am. It may be dark, but you are in the correct location.      We are open M-F from 8am- 5pm. We are closed on holidays and weekends. We can be reached at [phone].            Thank you,

## 2024-12-18 ENCOUNTER — ANESTHESIA (OUTPATIENT)
Dept: CARDIAC SURGERY | Facility: HOSPITAL | Age: 24
End: 2024-12-18
Payer: COMMERCIAL

## 2024-12-18 ENCOUNTER — HOSPITAL ENCOUNTER (INPATIENT)
Facility: HOSPITAL | Age: 24
LOS: 2 days | Discharge: HOME HEALTH | End: 2024-12-20
Attending: SURGERY | Admitting: SURGERY
Payer: COMMERCIAL

## 2024-12-18 ENCOUNTER — ANESTHESIA EVENT (OUTPATIENT)
Dept: CARDIAC SURGERY | Facility: HOSPITAL | Age: 24
End: 2024-12-18
Payer: COMMERCIAL

## 2024-12-18 DIAGNOSIS — I82.423 ACUTE DEEP VEIN THROMBOSIS (DVT) OF BOTH ILIOFEMORAL VEINS (HCC): ICD-10-CM

## 2024-12-18 DIAGNOSIS — Z01.818 PRE-OP TESTING: ICD-10-CM

## 2024-12-18 DIAGNOSIS — I82.220 INFERIOR VENA CAVAL THROMBOSIS (HCC): Primary | ICD-10-CM

## 2024-12-18 LAB
ALBUMIN SERPL-MCNC: 3 G/DL (ref 3.2–4.8)
ALBUMIN/GLOB SERPL: 1 {RATIO} (ref 1–2)
ALP LIVER SERPL-CCNC: 74 U/L
ALT SERPL-CCNC: <7 U/L
ANION GAP SERPL CALC-SCNC: 6 MMOL/L (ref 0–18)
ANION GAP SERPL CALC-SCNC: 6 MMOL/L (ref 0–18)
ANTIBODY SCREEN: NEGATIVE
APTT PPP: 139.8 SECONDS (ref 23–36)
APTT PPP: >240 SECONDS (ref 23–36)
AST SERPL-CCNC: 18 U/L (ref ?–34)
B-HCG UR QL: NEGATIVE
BASE EXCESS BLD CALC-SCNC: 4 MMOL/L
BASE EXCESS BLD CALC-SCNC: 4 MMOL/L
BASE EXCESS BLDA CALC-SCNC: 5.5 MMOL/L (ref ?–2)
BASOPHILS # BLD AUTO: 0.02 X10(3) UL (ref 0–0.2)
BASOPHILS NFR BLD AUTO: 0.3 %
BILIRUB SERPL-MCNC: 0.5 MG/DL (ref 0.3–1.2)
BLOOD TYPE BARCODE: 5100
BODY TEMPERATURE: 98.6 F
BUN BLD-MCNC: 5 MG/DL (ref 9–23)
BUN BLD-MCNC: 5 MG/DL (ref 9–23)
CA-I BLD-SCNC: 1.1 MMOL/L (ref 1.12–1.32)
CA-I BLD-SCNC: 1.12 MMOL/L (ref 1.12–1.32)
CA-I BLD-SCNC: 1.18 MMOL/L (ref 0.95–1.32)
CALCIUM BLD-MCNC: 8.1 MG/DL (ref 8.7–10.4)
CALCIUM BLD-MCNC: 8.1 MG/DL (ref 8.7–10.4)
CHLORIDE SERPL-SCNC: 107 MMOL/L (ref 98–112)
CHLORIDE SERPL-SCNC: 107 MMOL/L (ref 98–112)
CO2 BLD-SCNC: 28 MMOL/L (ref 22–32)
CO2 BLD-SCNC: 29 MMOL/L (ref 22–32)
CO2 SERPL-SCNC: 27 MMOL/L (ref 21–32)
CO2 SERPL-SCNC: 27 MMOL/L (ref 21–32)
COHGB MFR BLD: 2.1 % SAT (ref 0–3)
CREAT BLD-MCNC: 0.57 MG/DL
CREAT BLD-MCNC: 0.57 MG/DL
EGFRCR SERPLBLD CKD-EPI 2021: 128 ML/MIN/1.73M2 (ref 60–?)
EGFRCR SERPLBLD CKD-EPI 2021: 128 ML/MIN/1.73M2 (ref 60–?)
EOSINOPHIL # BLD AUTO: 0.01 X10(3) UL (ref 0–0.7)
EOSINOPHIL NFR BLD AUTO: 0.2 %
ERYTHROCYTE [DISTWIDTH] IN BLOOD BY AUTOMATED COUNT: 16.5 %
ERYTHROCYTE [DISTWIDTH] IN BLOOD BY AUTOMATED COUNT: 16.5 %
GLOBULIN PLAS-MCNC: 3 G/DL (ref 2–3.5)
GLUCOSE BLD-MCNC: 119 MG/DL (ref 70–99)
GLUCOSE BLD-MCNC: 152 MG/DL (ref 70–99)
GLUCOSE BLD-MCNC: 152 MG/DL (ref 70–99)
GLUCOSE BLD-MCNC: 158 MG/DL (ref 70–99)
HCO3 BLD-SCNC: 27.1 MEQ/L
HCO3 BLD-SCNC: 27.5 MEQ/L
HCO3 BLDA-SCNC: 29.2 MEQ/L (ref 21–27)
HCT VFR BLD AUTO: 30.8 %
HCT VFR BLD AUTO: 30.8 %
HCT VFR BLD CALC: 24 %
HCT VFR BLD CALC: 25 %
HGB BLD-MCNC: 10.1 G/DL
HGB BLD-MCNC: 9.9 G/DL
HGB BLD-MCNC: 9.9 G/DL
IMM GRANULOCYTES # BLD AUTO: 0.07 X10(3) UL (ref 0–1)
IMM GRANULOCYTES NFR BLD: 1.2 %
INR BLD: 1.29 (ref 0.8–1.2)
ISTAT ACTIVATED CLOTTING TIME: 153 SECONDS (ref 74–137)
L/M: 8 L/MIN
LACTATE BLD-SCNC: 1.1 MMOL/L (ref 0.5–2)
LYMPHOCYTES # BLD AUTO: 0.75 X10(3) UL (ref 1–4)
LYMPHOCYTES NFR BLD AUTO: 12.6 %
MCH RBC QN AUTO: 30.9 PG (ref 26–34)
MCH RBC QN AUTO: 30.9 PG (ref 26–34)
MCHC RBC AUTO-ENTMCNC: 32.1 G/DL (ref 31–37)
MCHC RBC AUTO-ENTMCNC: 32.1 G/DL (ref 31–37)
MCV RBC AUTO: 96.3 FL
MCV RBC AUTO: 96.3 FL
METHGB MFR BLD: 1.5 % SAT (ref 0.4–1.5)
MONOCYTES # BLD AUTO: 0.17 X10(3) UL (ref 0.1–1)
MONOCYTES NFR BLD AUTO: 2.9 %
NEUTROPHILS # BLD AUTO: 4.91 X10 (3) UL (ref 1.5–7.7)
NEUTROPHILS # BLD AUTO: 4.91 X10(3) UL (ref 1.5–7.7)
NEUTROPHILS NFR BLD AUTO: 82.8 %
OSMOLALITY SERPL CALC.SUM OF ELEC: 290 MOSM/KG (ref 275–295)
OSMOLALITY SERPL CALC.SUM OF ELEC: 290 MOSM/KG (ref 275–295)
OXYHGB MFR BLDA: 95.9 % (ref 92–100)
PCO2 BLD: 34.4 MMHG
PCO2 BLD: 37.7 MMHG
PCO2 BLDA: 42 MM HG (ref 35–45)
PH BLD: 7.47 [PH]
PH BLD: 7.51 [PH]
PH BLDA: 7.46 [PH] (ref 7.35–7.45)
PLATELET # BLD AUTO: 241 10(3)UL (ref 150–450)
PLATELET # BLD AUTO: 241 10(3)UL (ref 150–450)
PO2 BLD: 165 MMHG
PO2 BLD: 195 MMHG
PO2 BLDA: 134 MM HG (ref 80–100)
POTASSIUM BLD-SCNC: 3.5 MMOL/L (ref 3.6–5.1)
POTASSIUM BLD-SCNC: 3.6 MMOL/L (ref 3.6–5.1)
POTASSIUM BLD-SCNC: 3.9 MMOL/L (ref 3.6–5.1)
POTASSIUM SERPL-SCNC: 3.6 MMOL/L (ref 3.5–5.1)
POTASSIUM SERPL-SCNC: 3.6 MMOL/L (ref 3.5–5.1)
PROT SERPL-MCNC: 6 G/DL (ref 5.7–8.2)
PROTHROMBIN TIME: 16.2 SECONDS (ref 11.6–14.8)
RBC # BLD AUTO: 3.2 X10(6)UL
RBC # BLD AUTO: 3.2 X10(6)UL
RH BLOOD TYPE: POSITIVE
SAO2 % BLD: 100 %
SAO2 % BLD: 100 %
SODIUM BLD-SCNC: 137 MMOL/L (ref 136–145)
SODIUM BLD-SCNC: 137 MMOL/L (ref 136–145)
SODIUM BLD-SCNC: 139 MMOL/L (ref 135–145)
SODIUM SERPL-SCNC: 140 MMOL/L (ref 136–145)
SODIUM SERPL-SCNC: 140 MMOL/L (ref 136–145)
UNIT VOLUME: 350 ML
WBC # BLD AUTO: 5.9 X10(3) UL (ref 4–11)
WBC # BLD AUTO: 5.9 X10(3) UL (ref 4–11)

## 2024-12-18 PROCEDURE — 06CD3ZZ EXTIRPATION OF MATTER FROM LEFT COMMON ILIAC VEIN, PERCUTANEOUS APPROACH: ICD-10-PCS | Performed by: SURGERY

## 2024-12-18 PROCEDURE — B51D1ZZ FLUOROSCOPY OF BILATERAL LOWER EXTREMITY VEINS USING LOW OSMOLAR CONTRAST: ICD-10-PCS | Performed by: SURGERY

## 2024-12-18 PROCEDURE — 06CF3ZZ EXTIRPATION OF MATTER FROM RIGHT EXTERNAL ILIAC VEIN, PERCUTANEOUS APPROACH: ICD-10-PCS | Performed by: SURGERY

## 2024-12-18 PROCEDURE — B5191ZZ FLUOROSCOPY OF INFERIOR VENA CAVA USING LOW OSMOLAR CONTRAST: ICD-10-PCS | Performed by: SURGERY

## 2024-12-18 PROCEDURE — 06CC3ZZ EXTIRPATION OF MATTER FROM RIGHT COMMON ILIAC VEIN, PERCUTANEOUS APPROACH: ICD-10-PCS | Performed by: SURGERY

## 2024-12-18 PROCEDURE — B549ZZ3 ULTRASONOGRAPHY OF INFERIOR VENA CAVA, INTRAVASCULAR: ICD-10-PCS | Performed by: SURGERY

## 2024-12-18 PROCEDURE — 06CG3ZZ EXTIRPATION OF MATTER FROM LEFT EXTERNAL ILIAC VEIN, PERCUTANEOUS APPROACH: ICD-10-PCS | Performed by: SURGERY

## 2024-12-18 PROCEDURE — 36430 TRANSFUSION BLD/BLD COMPNT: CPT | Performed by: ANESTHESIOLOGY

## 2024-12-18 PROCEDURE — 06C03ZZ EXTIRPATION OF MATTER FROM INFERIOR VENA CAVA, PERCUTANEOUS APPROACH: ICD-10-PCS | Performed by: SURGERY

## 2024-12-18 PROCEDURE — 06PY3DZ REMOVAL OF INTRALUMINAL DEVICE FROM LOWER VEIN, PERCUTANEOUS APPROACH: ICD-10-PCS | Performed by: SURGERY

## 2024-12-18 PROCEDURE — B54DZZ3 ULTRASONOGRAPHY OF BILATERAL LOWER EXTREMITY VEINS, INTRAVASCULAR: ICD-10-PCS | Performed by: SURGERY

## 2024-12-18 PROCEDURE — 30233N1 TRANSFUSION OF NONAUTOLOGOUS RED BLOOD CELLS INTO PERIPHERAL VEIN, PERCUTANEOUS APPROACH: ICD-10-PCS | Performed by: SURGERY

## 2024-12-18 RX ORDER — HYDROCODONE BITARTRATE AND ACETAMINOPHEN 5; 325 MG/1; MG/1
2 TABLET ORAL ONCE AS NEEDED
Status: ACTIVE | OUTPATIENT
Start: 2024-12-18 | End: 2024-12-18

## 2024-12-18 RX ORDER — DIPHENHYDRAMINE HYDROCHLORIDE 50 MG/ML
12.5 INJECTION INTRAMUSCULAR; INTRAVENOUS EVERY 4 HOURS PRN
Status: DISCONTINUED | OUTPATIENT
Start: 2024-12-18 | End: 2024-12-19

## 2024-12-18 RX ORDER — METOCLOPRAMIDE HYDROCHLORIDE 5 MG/ML
INJECTION INTRAMUSCULAR; INTRAVENOUS AS NEEDED
Status: DISCONTINUED | OUTPATIENT
Start: 2024-12-18 | End: 2024-12-18 | Stop reason: SURG

## 2024-12-18 RX ORDER — LIDOCAINE HYDROCHLORIDE 10 MG/ML
INJECTION, SOLUTION EPIDURAL; INFILTRATION; INTRACAUDAL; PERINEURAL AS NEEDED
Status: DISCONTINUED | OUTPATIENT
Start: 2024-12-18 | End: 2024-12-18 | Stop reason: SURG

## 2024-12-18 RX ORDER — NALOXONE HYDROCHLORIDE 0.4 MG/ML
0.08 INJECTION, SOLUTION INTRAMUSCULAR; INTRAVENOUS; SUBCUTANEOUS AS NEEDED
Status: ACTIVE | OUTPATIENT
Start: 2024-12-18 | End: 2024-12-18

## 2024-12-18 RX ORDER — DEXAMETHASONE SODIUM PHOSPHATE 4 MG/ML
VIAL (ML) INJECTION AS NEEDED
Status: DISCONTINUED | OUTPATIENT
Start: 2024-12-18 | End: 2024-12-18 | Stop reason: SURG

## 2024-12-18 RX ORDER — HEPARIN SODIUM AND DEXTROSE 10000; 5 [USP'U]/100ML; G/100ML
INJECTION INTRAVENOUS CONTINUOUS
Status: DISCONTINUED | OUTPATIENT
Start: 2024-12-18 | End: 2024-12-20 | Stop reason: ALTCHOICE

## 2024-12-18 RX ORDER — IODIXANOL 320 MG/ML
100 INJECTION, SOLUTION INTRAVASCULAR
Status: DISCONTINUED | OUTPATIENT
Start: 2024-12-18 | End: 2024-12-20

## 2024-12-18 RX ORDER — ONDANSETRON 2 MG/ML
4 INJECTION INTRAMUSCULAR; INTRAVENOUS AS NEEDED
Status: ACTIVE | OUTPATIENT
Start: 2024-12-18 | End: 2024-12-18

## 2024-12-18 RX ORDER — ONDANSETRON 2 MG/ML
INJECTION INTRAMUSCULAR; INTRAVENOUS AS NEEDED
Status: DISCONTINUED | OUTPATIENT
Start: 2024-12-18 | End: 2024-12-18 | Stop reason: SURG

## 2024-12-18 RX ORDER — MIDAZOLAM HYDROCHLORIDE 1 MG/ML
INJECTION INTRAMUSCULAR; INTRAVENOUS AS NEEDED
Status: DISCONTINUED | OUTPATIENT
Start: 2024-12-18 | End: 2024-12-18 | Stop reason: SURG

## 2024-12-18 RX ORDER — CLOPIDOGREL BISULFATE 75 MG/1
75 TABLET ORAL DAILY
Status: DISCONTINUED | OUTPATIENT
Start: 2024-12-19 | End: 2024-12-20

## 2024-12-18 RX ORDER — SODIUM CHLORIDE 9 MG/ML
INJECTION, SOLUTION INTRAVENOUS CONTINUOUS PRN
Status: DISCONTINUED | OUTPATIENT
Start: 2024-12-18 | End: 2024-12-18 | Stop reason: SURG

## 2024-12-18 RX ORDER — METOCLOPRAMIDE HYDROCHLORIDE 5 MG/ML
10 INJECTION INTRAMUSCULAR; INTRAVENOUS AS NEEDED
Status: ACTIVE | OUTPATIENT
Start: 2024-12-18 | End: 2024-12-18

## 2024-12-18 RX ORDER — SODIUM CHLORIDE 9 MG/ML
INJECTION, SOLUTION INTRAVENOUS CONTINUOUS
Status: DISCONTINUED | OUTPATIENT
Start: 2024-12-18 | End: 2024-12-19

## 2024-12-18 RX ORDER — HEPARIN SODIUM AND DEXTROSE 10000; 5 [USP'U]/100ML; G/100ML
18 INJECTION INTRAVENOUS ONCE
Status: COMPLETED | OUTPATIENT
Start: 2024-12-18 | End: 2024-12-18

## 2024-12-18 RX ORDER — ONDANSETRON 2 MG/ML
4 INJECTION INTRAMUSCULAR; INTRAVENOUS EVERY 6 HOURS PRN
Status: DISCONTINUED | OUTPATIENT
Start: 2024-12-18 | End: 2024-12-19

## 2024-12-18 RX ORDER — HEPARIN SODIUM 1000 [USP'U]/ML
INJECTION, SOLUTION INTRAVENOUS; SUBCUTANEOUS AS NEEDED
Status: DISCONTINUED | OUTPATIENT
Start: 2024-12-18 | End: 2024-12-18 | Stop reason: SURG

## 2024-12-18 RX ORDER — HYDROCODONE BITARTRATE AND ACETAMINOPHEN 5; 325 MG/1; MG/1
1 TABLET ORAL ONCE AS NEEDED
Status: ACTIVE | OUTPATIENT
Start: 2024-12-18 | End: 2024-12-18

## 2024-12-18 RX ORDER — DEXAMETHASONE SODIUM PHOSPHATE 4 MG/ML
4 VIAL (ML) INJECTION AS NEEDED
Status: ACTIVE | OUTPATIENT
Start: 2024-12-18 | End: 2024-12-18

## 2024-12-18 RX ORDER — NALOXONE HYDROCHLORIDE 0.4 MG/ML
0.08 INJECTION, SOLUTION INTRAMUSCULAR; INTRAVENOUS; SUBCUTANEOUS
Status: DISCONTINUED | OUTPATIENT
Start: 2024-12-18 | End: 2024-12-19

## 2024-12-18 RX ORDER — ROCURONIUM BROMIDE 10 MG/ML
INJECTION, SOLUTION INTRAVENOUS AS NEEDED
Status: DISCONTINUED | OUTPATIENT
Start: 2024-12-18 | End: 2024-12-18 | Stop reason: SURG

## 2024-12-18 RX ORDER — HYDROMORPHONE HYDROCHLORIDE 1 MG/ML
0.4 INJECTION, SOLUTION INTRAMUSCULAR; INTRAVENOUS; SUBCUTANEOUS EVERY 5 MIN PRN
Status: ACTIVE | OUTPATIENT
Start: 2024-12-18 | End: 2024-12-18

## 2024-12-18 RX ADMIN — SODIUM CHLORIDE: 9 INJECTION, SOLUTION INTRAVENOUS at 13:30:00

## 2024-12-18 RX ADMIN — DEXAMETHASONE SODIUM PHOSPHATE 8 MG: 4 MG/ML VIAL (ML) INJECTION at 08:20:00

## 2024-12-18 RX ADMIN — SODIUM CHLORIDE: 9 INJECTION, SOLUTION INTRAVENOUS at 07:31:00

## 2024-12-18 RX ADMIN — ONDANSETRON 4 MG: 2 INJECTION INTRAMUSCULAR; INTRAVENOUS at 12:51:00

## 2024-12-18 RX ADMIN — LIDOCAINE HYDROCHLORIDE 50 MG: 10 INJECTION, SOLUTION EPIDURAL; INFILTRATION; INTRACAUDAL; PERINEURAL at 07:40:00

## 2024-12-18 RX ADMIN — ROCURONIUM BROMIDE 10 MG: 10 INJECTION, SOLUTION INTRAVENOUS at 11:00:00

## 2024-12-18 RX ADMIN — HEPARIN SODIUM 10000 UNITS: 1000 INJECTION, SOLUTION INTRAVENOUS; SUBCUTANEOUS at 09:37:00

## 2024-12-18 RX ADMIN — ROCURONIUM BROMIDE 10 MG: 10 INJECTION, SOLUTION INTRAVENOUS at 09:29:00

## 2024-12-18 RX ADMIN — ROCURONIUM BROMIDE 10 MG: 10 INJECTION, SOLUTION INTRAVENOUS at 11:29:00

## 2024-12-18 RX ADMIN — HEPARIN SODIUM 2000 UNITS: 1000 INJECTION, SOLUTION INTRAVENOUS; SUBCUTANEOUS at 10:32:00

## 2024-12-18 RX ADMIN — ROCURONIUM BROMIDE 10 MG: 10 INJECTION, SOLUTION INTRAVENOUS at 11:59:00

## 2024-12-18 RX ADMIN — HEPARIN SODIUM 1000 UNITS: 1000 INJECTION, SOLUTION INTRAVENOUS; SUBCUTANEOUS at 11:04:00

## 2024-12-18 RX ADMIN — METOCLOPRAMIDE HYDROCHLORIDE 10 MG: 5 INJECTION INTRAMUSCULAR; INTRAVENOUS at 08:20:00

## 2024-12-18 RX ADMIN — HEPARIN SODIUM 3000 UNITS: 1000 INJECTION, SOLUTION INTRAVENOUS; SUBCUTANEOUS at 11:37:00

## 2024-12-18 RX ADMIN — ROCURONIUM BROMIDE 20 MG: 10 INJECTION, SOLUTION INTRAVENOUS at 08:59:00

## 2024-12-18 RX ADMIN — ROCURONIUM BROMIDE 50 MG: 10 INJECTION, SOLUTION INTRAVENOUS at 07:40:00

## 2024-12-18 RX ADMIN — ROCURONIUM BROMIDE 50 MG: 10 INJECTION, SOLUTION INTRAVENOUS at 08:20:00

## 2024-12-18 RX ADMIN — MIDAZOLAM HYDROCHLORIDE 2 MG: 1 INJECTION INTRAMUSCULAR; INTRAVENOUS at 07:31:00

## 2024-12-18 RX ADMIN — ROCURONIUM BROMIDE 10 MG: 10 INJECTION, SOLUTION INTRAVENOUS at 10:28:00

## 2024-12-18 RX ADMIN — HEPARIN SODIUM 5000 UNITS: 1000 INJECTION, SOLUTION INTRAVENOUS; SUBCUTANEOUS at 10:18:00

## 2024-12-18 NOTE — BRIEF OP NOTE
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, morbid obesity      Procedure Performed:   US percutaneous access left proximal superficial femoral vein - 13 and 16 fr inari sheath   US percutaneous access right distal common femoral vein - 13 and 16 fr inari sheath  US percutaneous access right internal jugular vein - 20 Mal[place] protrieve sheath   Inferior vena cava filter removal  Percutaneous thrombectomy of inferior vena cava, left and right common iliac veins, left and right external iliac vein with inari clot triever devices  IVUS IVC and iliac veins     Surgeons and Role:     * Jamaal Koroma MD - Primary    Assistant(s):       Surgical Findings:   Clearance of acute and chronic thrombus from vena cava and iliac veins with minimal chronic thrombus      Specimen: acute and chronic thrombus      Estimated Blood Loss: Blood Output: 600 mL (12/18/2024 12:54 PM)    Jamaal Koroma MD  12/18/2024  1:14 PM

## 2024-12-18 NOTE — ANESTHESIA PROCEDURE NOTES
Airway  Date/Time: 12/18/2024 7:42 AM  Urgency: elective    Airway not difficult    General Information and Staff    Patient location during procedure: OR  Anesthesiologist: Shahbaz Coates MD  Performed: anesthesiologist   Performed by: Shahbaz Coates MD  Authorized by: Shahbaz Coates MD      Indications and Patient Condition  Indications for airway management: anesthesia  Sedation level: deep  Preoxygenated: yes  Patient position: sniffing  Mask difficulty assessment: 1 - vent by mask    Final Airway Details  Final airway type: endotracheal airway      Successful airway: ETT  Cuffed: yes   Successful intubation technique: direct laryngoscopy  Endotracheal tube insertion site: oral  Blade: Rodger  Blade size: #3  ETT size (mm): 7.5    Cormack-Lehane Classification: grade I - full view of glottis  Placement verified by: capnometry   Measured from: lips  ETT to lips (cm): 21  Number of attempts at approach: 1
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
## 2024-12-18 NOTE — ANESTHESIA PROCEDURE NOTES
Central Line    Date/Time: 12/18/2024 7:49 AM    Performed by: Shahbaz Coates MD  Authorized by: Shahbaz Coates MD    General Information and Staff    Procedure Start:  12/18/2024 7:49 AM  Procedure End:  12/18/2024 8:00 AM  Anesthesiologist:  Shahbaz Coates MD  Performed by:  Anesthesiologist  Patient Location:  OR  Indication: central venous access and CVP monitoring    Site Identification: real time ultrasound guided    Preanesthetic Checklist: 2 patient identifiers, IV checked, risks and benefits discussed, monitors and equipment checked, pre-op evaluation, timeout performed, anesthesia consent and sterile technique used    Procedure Detail    Patient Position:  Trendelenburg  Laterality:  Right  Site:  Internal jugular  Prep:  Chloraprep  Catheter Size:  9 Fr  Catheter Type:  MAC introducer  Number of Lumens:  Double lumen  Procedure Detail: target vein identified, needle advanced into vein and blood aspirated and guidewire advanced into vein    Seldinger Technique?: Yes    Intravenous Verification: verified by ultrasound and venous blood return    Post Insertion: all ports aspirated, all ports flushed easily, guidewire was removed intact, line was sutured in place and dressing was applied      Assessment    Events: patient tolerated procedure well with no complications      PA Catheter Placement    PA Catheter Size:  8    Additional Comments

## 2024-12-18 NOTE — ANESTHESIA PREPROCEDURE EVALUATION
PRE-OP EVALUATION    Patient Name: Carmela Bell    Admit Diagnosis: inferior vena cava thrombosis, acute deep vein thrombosis of both iliofemoral veins, hematoma of         sheath, intitial encounter  Inferior vena caval thrombosis (HCC)    Pre-op Diagnosis: inferior vena cava thrombosis, acute deep vein thrombosis of both iliofemoral veins, hematoma of         sheath, intitial encounter    PERCUTANEOUS THROMBECTOMY OF INFERIOR VENA CAVA, BILATERAL ILIOFEMORAL VEINS, BILATERAL POPLITEAL VEINS WITH RIGHT INTERNAL JUGULAR ACCESS, BILATERAL SUPERFICIAL FEMORAL VEIN ACCESS, POST TIBIAL VEIN ACCESS, CELL SAVER    Anesthesia Procedure: PERCUTANEOUS THROMBECTOMY OF INFERIOR VENA CAVA, BILATERAL ILIOFEMORAL VEINS, BILATERAL POPLITEAL VEINS WITH RIGHT INTERNAL JUGULAR ACCESS, BILATERAL SUPERFICIAL FEMORAL VEIN ACCESS, POST TIBIAL VEIN ACCESS, CELL SAVER (Bilateral)  ANGIOGRAM ADD-ON (Bilateral)    Surgeons and Role:     * Jamaal Koroma MD - Primary    Pre-op vitals reviewed.        Body mass index is 42.09 kg/m².    Current medications reviewed.  Hospital Medications:  No current facility-administered medications on file as of 2024.       Outpatient Medications:   Prescriptions Prior to Admission[1]    Allergies: Mastisol adhesive and Nystatin      Anesthesia Evaluation    Patient summary reviewed.    Anesthetic Complications  (-) history of anesthetic complications         GI/Hepatic/Renal                                 Cardiovascular                (+) obesity                                       Endo/Other               (+) anemia      (+) clotting disorder             Pulmonary      (+) asthma              (+) sleep apnea       Neuro/Psych      (+) depression           (+) neuromuscular disease                     Past Surgical History:   Procedure Laterality Date    Abdominoplasty      Adenoidectomy             delivery+postpartum care  2018    Cholecystectomy  2018    [facility]  sleeve gastrectomy  04/2019    Myringotomy, laser-assisted Bilateral     Other surgical history Bilateral     2018- ankle surgery    Tonsillectomy       Social History     Socioeconomic History    Marital status: Single   Tobacco Use    Smoking status: Never     Passive exposure: Never    Smokeless tobacco: Never   Vaping Use    Vaping status: Never Used   Substance and Sexual Activity    Alcohol use: Not Currently    Drug use: No    Sexual activity: Yes     Partners: Male     Birth control/protection: I.U.D.     Comment: Kyleena     History   Drug Use No     Available pre-op labs reviewed.  Lab Results   Component Value Date    WBC 14.8 (H) 11/20/2024    RBC 2.55 (L) 11/20/2024    HGB 8.0 (L) 11/20/2024    HCT 24.9 (L) 11/20/2024    MCV 97.6 11/20/2024    MCH 31.4 11/20/2024    MCHC 32.1 11/20/2024    RDW 14.3 11/20/2024    .0 11/20/2024     Lab Results   Component Value Date     (L) 11/20/2024    K 3.8 11/20/2024    K 3.8 11/20/2024     11/20/2024    CO2 28.0 11/20/2024    BUN 10 11/20/2024    CREATSERUM 0.62 11/20/2024     (H) 11/20/2024    CA 8.1 (L) 11/20/2024            Airway      Mallampati: II  Mouth opening: 3 FB  TM distance: 4 - 6 cm  Neck ROM: full Cardiovascular      Rhythm: regular  Rate: normal     Dental  Comment: No loose teeth reported by patient           Pulmonary      Breath sounds clear to auscultation bilaterally.               Other findings              ASA: 3   Plan: general  NPO status verified and patient meets guidelines.    Post-procedure pain management plan discussed with surgeon and patient.    Comment: Discussed general anesthesia with endotracheal tube/supraglottic airway with patient. Discussed risk of oral/dental trauma, nausea, rare allergic reactions. Patient understands the risks, benefits, and requirement for anesthesia and willing to proceed. Consent signed.      Kate and central line discussed  Plan/risks discussed with:  patient                Present on Admission:  **None**             [1]   Medications Prior to Admission   Medication Sig Dispense Refill Last Dose/Taking    enoxaparin (LOVENOX) 100 MG/ML Injection Solution Prefilled Syringe Inject 1 mg/kg into the skin 2 (two) times daily.   12/17/2024 Bedtime    lidocaine-menthol 4-1 % External Patch Place 1 patch onto the skin daily as needed.   Taking As Needed    polyethylene glycol, PEG 3350, 17 g Oral Powd Pack Take 17 g by mouth daily as needed (If no bowel movement in last 24 hours).   Taking As Needed    sennosides 17.2 MG Oral Tab Take 1 tablet (17.2 mg total) by mouth nightly as needed (constipation, as needed if no bowel movement that day).   Taking As Needed    oxyCODONE HCl 5 MG Oral Cap Take 1 capsule (5 mg total) by mouth every 4 (four) hours as needed.   12/17/2024 at 10:00 PM    cyanocobalamin 1000 MCG Oral Tab Take 1 tablet (1,000 mcg total) by mouth daily. 90 tablet 0 12/11/2024    folic acid 1 MG Oral Tab Take 1 tablet (1 mg total) by mouth daily. 90 tablet 0 12/11/2024    sennosides 8.6 MG Oral Tab Take 1 tablet (8.6 mg total) by mouth 2 (two) times daily. (Patient not taking: Reported on 12/13/2024)   Not Taking    continuous dose heparin 100 Units/mL Intravenous Solution Inject 200-3,000 Units/hr into the vein continuous. (Patient not taking: Reported on 12/13/2024)   Not Taking    vancomycin in sodium chloride 0.9% 1.75 g/500mL Intravenous Solution Inject 500 mL (1,750 mg total) into the vein every 12 (twelve) hours. (Patient not taking: Reported on 12/13/2024)   Not Taking

## 2024-12-18 NOTE — ANESTHESIA POSTPROCEDURE EVALUATION
[facility]    Carmela Bell Patient Status:  Inpatient   Age/Gender 24 year old female MRN [id number]   Location [facility] 4SW-A Attending Jamaal Koroma MD   Hosp Day # 0 PCP Deven Sharif DO       Anesthesia Post-op Note    PERCUTANEOUS THROMBECTOMY OF INFERIOR VENA CAVA, BILATERAL ILIOFEMORAL VEINS, BILATERAL POPLITEAL VEINS WITH RIGHT INTERNAL JUGULAR ACCESS, BILATERAL SUPERFICIAL FEMORAL VEIN ACCESS, POST TIBIAL VEIN ACCESS, CELL SAVER    Procedure Summary       Date: 12/18/24 Room / Location:  [facility] 03 HYBRID /  CVOR    Anesthesia Start: 0731 Anesthesia Stop:     Procedures:       PERCUTANEOUS THROMBECTOMY OF INFERIOR VENA CAVA, BILATERAL ILIOFEMORAL VEINS, BILATERAL POPLITEAL VEINS WITH RIGHT INTERNAL JUGULAR ACCESS, BILATERAL SUPERFICIAL FEMORAL VEIN ACCESS, POST TIBIAL VEIN ACCESS, CELL SAVER (Bilateral)      ANGIOGRAM ADD-ON (Bilateral) Diagnosis: (inferior vena cava thrombosis, acute deep vein thrombosis of both iliofemoral veins, hematoma of         sheath, intitial encounter)    Surgeons: Jamaal Koroma MD Anesthesiologist: Shahbaz Coates MD    Anesthesia Type: general ASA Status: 3            Anesthesia Type: general    Vitals Value Taken Time   /67 12/18/24 1330   Temp 97 12/18/24 1330   Pulse 81 12/18/24 1329   Resp 20 12/18/24 1329   SpO2 100 % 12/18/24 1329   Vitals shown include unfiled device data.    Patient Location: ICU    Anesthesia Type: general    Airway Patency: patent    Postop Pain Control: adequate    Mental Status: mildly sedated but able to meaningfully participate in the post-anesthesia evaluation    Nausea/Vomiting: none    Cardiopulmonary/Hydration status: stable euvolemic    Complications: no apparent anesthesia related complications    Postop vital signs: stable    Dental Exam: Unchanged from Preop    Sign out given to ICU staff.

## 2024-12-18 NOTE — ANESTHESIA PROCEDURE NOTES
Arterial Line    Date/Time: 12/18/2024 7:43 AM    Performed by: Shahbaz Coates MD  Authorized by: Shahbaz Coates MD    General Information and Staff    Procedure Start:  12/18/2024 7:43 AM  Procedure End:  12/18/2024 7:48 AM  Anesthesiologist:  Shahbaz Coates MD  Performed By:  Anesthesiologist  Patient Location:  OR  Indication: continuous blood pressure monitoring and blood sampling needed    Site Identification: surface landmarks    Preanesthetic Checklist: 2 patient identifiers, IV checked, risks and benefits discussed, monitors and equipment checked, pre-op evaluation, timeout performed, anesthesia consent and sterile technique used    Procedure Details    Catheter Size:  20 G  Catheter Length:  1 and 3/4 inch  Catheter Type:  Arrow  Seldinger Technique?: Yes    Laterality:  Left  Site:  Radial artery  Site Prep: chlorhexidine    Line Secured:  Tape and Tegaderm    Assessment    Events: patient tolerated procedure well with no complications      Medications  12/18/2024 7:43 AM      Additional Comments

## 2024-12-19 ENCOUNTER — APPOINTMENT (OUTPATIENT)
Dept: CV DIAGNOSTICS | Facility: HOSPITAL | Age: 24
End: 2024-12-19
Attending: NURSE PRACTITIONER
Payer: COMMERCIAL

## 2024-12-19 ENCOUNTER — APPOINTMENT (OUTPATIENT)
Dept: CT IMAGING | Facility: HOSPITAL | Age: 24
End: 2024-12-19
Attending: SURGERY
Payer: COMMERCIAL

## 2024-12-19 PROBLEM — D68.59 HYPERCOAGULABLE STATE (HCC): Status: ACTIVE | Noted: 2024-12-19

## 2024-12-19 LAB
ALBUMIN SERPL-MCNC: 2.6 G/DL (ref 3.2–4.8)
ALBUMIN/GLOB SERPL: 1.1 {RATIO} (ref 1–2)
ALP LIVER SERPL-CCNC: 62 U/L
ALT SERPL-CCNC: 16 U/L
ANION GAP SERPL CALC-SCNC: 5 MMOL/L (ref 0–18)
APTT PPP: 136.4 SECONDS (ref 23–36)
APTT PPP: 154.7 SECONDS (ref 23–36)
APTT PPP: 77.2 SECONDS (ref 23–36)
AST SERPL-CCNC: 10 U/L (ref ?–34)
BASE EXCESS BLD CALC-SCNC: 2 MMOL/L
BASOPHILS # BLD AUTO: 0.01 X10(3) UL (ref 0–0.2)
BASOPHILS # BLD AUTO: 0.01 X10(3) UL (ref 0–0.2)
BASOPHILS NFR BLD AUTO: 0.2 %
BASOPHILS NFR BLD AUTO: 0.2 %
BILIRUB SERPL-MCNC: 0.3 MG/DL (ref 0.3–1.2)
BUN BLD-MCNC: 6 MG/DL (ref 9–23)
CA-I BLD-SCNC: 1.12 MMOL/L (ref 1.12–1.32)
CALCIUM BLD-MCNC: 7.9 MG/DL (ref 8.7–10.4)
CHLORIDE SERPL-SCNC: 108 MMOL/L (ref 98–112)
CO2 BLD-SCNC: 27 MMOL/L (ref 22–32)
CO2 SERPL-SCNC: 29 MMOL/L (ref 21–32)
CREAT BLD-MCNC: 0.44 MG/DL
DEPRECATED HBV CORE AB SER IA-ACNC: 529 NG/ML
EGFRCR SERPLBLD CKD-EPI 2021: 137 ML/MIN/1.73M2 (ref 60–?)
EOSINOPHIL # BLD AUTO: 0.01 X10(3) UL (ref 0–0.7)
EOSINOPHIL # BLD AUTO: 0.08 X10(3) UL (ref 0–0.7)
EOSINOPHIL NFR BLD AUTO: 0.2 %
EOSINOPHIL NFR BLD AUTO: 1.2 %
ERYTHROCYTE [DISTWIDTH] IN BLOOD BY AUTOMATED COUNT: 16.5 %
ERYTHROCYTE [DISTWIDTH] IN BLOOD BY AUTOMATED COUNT: 17.1 %
FOLATE SERPL-MCNC: 5.6 NG/ML (ref 5.4–?)
GLOBULIN PLAS-MCNC: 2.4 G/DL (ref 2–3.5)
GLUCOSE BLD-MCNC: 111 MG/DL (ref 70–99)
GLUCOSE BLD-MCNC: 88 MG/DL (ref 70–99)
HCO3 BLD-SCNC: 26.3 MEQ/L
HCT VFR BLD AUTO: 25.1 %
HCT VFR BLD AUTO: 29.6 %
HCT VFR BLD CALC: 22 %
HGB BLD-MCNC: 8.2 G/DL
HGB BLD-MCNC: 9.6 G/DL
IMM GRANULOCYTES # BLD AUTO: 0.03 X10(3) UL (ref 0–1)
IMM GRANULOCYTES # BLD AUTO: 0.04 X10(3) UL (ref 0–1)
IMM GRANULOCYTES NFR BLD: 0.5 %
IMM GRANULOCYTES NFR BLD: 0.6 %
IRON SATN MFR SERPL: 68 %
IRON SERPL-MCNC: 133 UG/DL
ISTAT ACTIVATED CLOTTING TIME: 210 SECONDS (ref 74–137)
ISTAT ACTIVATED CLOTTING TIME: 239 SECONDS (ref 74–137)
ISTAT ACTIVATED CLOTTING TIME: 250 SECONDS (ref 74–137)
ISTAT ACTIVATED CLOTTING TIME: 256 SECONDS (ref 74–137)
ISTAT ACTIVATED CLOTTING TIME: 262 SECONDS (ref 74–137)
ISTAT ACTIVATED CLOTTING TIME: 285 SECONDS (ref 74–137)
LYMPHOCYTES # BLD AUTO: 1.28 X10(3) UL (ref 1–4)
LYMPHOCYTES # BLD AUTO: 1.33 X10(3) UL (ref 1–4)
LYMPHOCYTES NFR BLD AUTO: 19.2 %
LYMPHOCYTES NFR BLD AUTO: 23.6 %
MCH RBC QN AUTO: 31 PG (ref 26–34)
MCH RBC QN AUTO: 31.5 PG (ref 26–34)
MCHC RBC AUTO-ENTMCNC: 32.4 G/DL (ref 31–37)
MCHC RBC AUTO-ENTMCNC: 32.7 G/DL (ref 31–37)
MCV RBC AUTO: 95.5 FL
MCV RBC AUTO: 96.5 FL
MONOCYTES # BLD AUTO: 0.53 X10(3) UL (ref 0.1–1)
MONOCYTES # BLD AUTO: 0.61 X10(3) UL (ref 0.1–1)
MONOCYTES NFR BLD AUTO: 9.2 %
MONOCYTES NFR BLD AUTO: 9.4 %
NEUTROPHILS # BLD AUTO: 3.72 X10 (3) UL (ref 1.5–7.7)
NEUTROPHILS # BLD AUTO: 3.72 X10(3) UL (ref 1.5–7.7)
NEUTROPHILS # BLD AUTO: 4.64 X10 (3) UL (ref 1.5–7.7)
NEUTROPHILS # BLD AUTO: 4.64 X10(3) UL (ref 1.5–7.7)
NEUTROPHILS NFR BLD AUTO: 66.1 %
NEUTROPHILS NFR BLD AUTO: 69.6 %
OSMOLALITY SERPL CALC.SUM OF ELEC: 292 MOSM/KG (ref 275–295)
PCO2 BLD: 37.2 MMHG
PH BLD: 7.46 [PH]
PLATELET # BLD AUTO: 204 10(3)UL (ref 150–450)
PLATELET # BLD AUTO: 211 10(3)UL (ref 150–450)
PO2 BLD: 296 MMHG
POTASSIUM BLD-SCNC: 3.1 MMOL/L (ref 3.6–5.1)
POTASSIUM SERPL-SCNC: 3.8 MMOL/L (ref 3.5–5.1)
PROT SERPL-MCNC: 5 G/DL (ref 5.7–8.2)
RBC # BLD AUTO: 2.6 X10(6)UL
RBC # BLD AUTO: 3.1 X10(6)UL
SAO2 % BLD: 100 %
SODIUM BLD-SCNC: 139 MMOL/L (ref 136–145)
SODIUM SERPL-SCNC: 142 MMOL/L (ref 136–145)
TOTAL IRON BINDING CAPACITY: 196 UG/DL (ref 250–425)
TRANSFERRIN SERPL-MCNC: 135 MG/DL (ref 250–380)
VIT B12 SERPL-MCNC: 168 PG/ML (ref 211–911)
WBC # BLD AUTO: 5.6 X10(3) UL (ref 4–11)
WBC # BLD AUTO: 6.7 X10(3) UL (ref 4–11)

## 2024-12-19 PROCEDURE — 74177 CT ABD & PELVIS W/CONTRAST: CPT | Performed by: SURGERY

## 2024-12-19 PROCEDURE — 93306 TTE W/DOPPLER COMPLETE: CPT | Performed by: NURSE PRACTITIONER

## 2024-12-19 PROCEDURE — 99255 IP/OBS CONSLTJ NEW/EST HI 80: CPT | Performed by: NURSE PRACTITIONER

## 2024-12-19 RX ORDER — ONDANSETRON 2 MG/ML
4 INJECTION INTRAMUSCULAR; INTRAVENOUS EVERY 6 HOURS PRN
Status: DISCONTINUED | OUTPATIENT
Start: 2024-12-19 | End: 2024-12-20

## 2024-12-19 RX ORDER — NALOXONE HYDROCHLORIDE 0.4 MG/ML
0.08 INJECTION, SOLUTION INTRAMUSCULAR; INTRAVENOUS; SUBCUTANEOUS
Status: DISCONTINUED | OUTPATIENT
Start: 2024-12-19 | End: 2024-12-20

## 2024-12-19 RX ORDER — DIPHENHYDRAMINE HYDROCHLORIDE 50 MG/ML
12.5 INJECTION INTRAMUSCULAR; INTRAVENOUS EVERY 4 HOURS PRN
Status: DISCONTINUED | OUTPATIENT
Start: 2024-12-19 | End: 2024-12-20

## 2024-12-19 RX ORDER — SODIUM CHLORIDE 9 MG/ML
INJECTION, SOLUTION INTRAVENOUS CONTINUOUS
Status: DISCONTINUED | OUTPATIENT
Start: 2024-12-19 | End: 2024-12-20

## 2024-12-19 RX ORDER — SODIUM CHLORIDE 9 MG/ML
INJECTION, SOLUTION INTRAVENOUS ONCE
Status: DISCONTINUED | OUTPATIENT
Start: 2024-12-19 | End: 2024-12-20

## 2024-12-19 RX ORDER — GABAPENTIN 100 MG/1
100 CAPSULE ORAL 3 TIMES DAILY
Status: DISCONTINUED | OUTPATIENT
Start: 2024-12-19 | End: 2024-12-20

## 2024-12-19 RX ORDER — POTASSIUM CHLORIDE 14.9 MG/ML
20 INJECTION INTRAVENOUS ONCE
Status: COMPLETED | OUTPATIENT
Start: 2024-12-19 | End: 2024-12-19

## 2024-12-19 NOTE — PROGRESS NOTES
.[facility] note    PCP: Deven Sharif DO    Chief Complaint:  F/u thrombectomy    SUBJECTIVE:  Pt reports discomfort in legs, bridgette on R that is alleviated with dilaudid pca  Passing gas, moving bowels, eating.  No sob    OBJECTIVE:  Temp:  [97.1 °F (36.2 °C)-97.8 °F (36.6 °C)] 97.1 °F (36.2 °C)  Pulse:  [] 74  Resp:  [5-23] 12  BP: ()/(58-79) 107/58  SpO2:  [91 %-100 %] 100 %  AO: (104-134)/(62-75) 119/70    Intake/Output:    Intake/Output Summary (Last 24 hours) at 12/19/2024 1652  Last data filed at 12/19/2024 1200  Gross per 24 hour   Intake 1165.6 ml   Output 2970 ml   Net -1804.4 ml       Last 3 Weights   12/19/24 0400 274 lb 14.6 oz (124.7 kg)   12/18/24 0721 269 lb 14.4 oz (122.4 kg)   12/13/24 1139 285 lb (129.3 kg)   11/20/24 0600 283 lb 15.2 oz (128.8 kg)   11/19/24 1602 278 lb 10.6 oz (126.4 kg)   11/19/24 1150 272 lb (123.4 kg)   11/19/24 1855 278 lb 10.6 oz (126.4 kg)       Exam  Gen: No acute distress  HEENT: anicteric sclera, MMM  Pulm: Lungs clear, normal respiratory effort  CV: Heart with regular rate and rhythm, no peripheral edema  Abd: Abdomen soft, nontender, nondistended, no organomegaly, bowel sounds present  MSK: Full range of motion in extremities, no clubbing, no cyanosis  Skin: no rashes or lesions  Neuro: A&OX3, no focal deficits    Data Review:         Labs:     Recent Labs   Lab 12/18/24  1347 12/19/24  0414 12/19/24  1237   WBC 5.9  5.9 5.6 6.7   HGB 9.9*  9.9* 8.2* 9.6*   MCV 96.3  96.3 96.5 95.5   .0  241.0 211.0 204.0   NE 4.91 3.72 4.64   LYMABS 0.75* 1.33 1.28   INR 1.29*  --   --        Recent Labs   Lab 12/18/24  1347 12/19/24  0414     140 142   K 3.6  3.6 3.8     107 108   CO2 27.0  27.0 29.0   BUN 5*  5* 6*   CREATSERUM 0.57  0.57 0.44*   CA 8.1*  8.1* 7.9*   *  152* 111*       Recent Labs   Lab 12/18/24  1347 12/19/24  0414   ALT <7* 16   AST 18 10   ALB 3.0* 2.6*       No results for input(s): \"TROP\", \"CK\", \"PBNP\",  \"PCT\" in the last 168 hours.    Recent Labs   Lab 12/19/24  1237   RAFITA 529*       No results for input(s): \"PGLU\" in the last 168 hours.    Meds:   Scheduled Medication:   sodium chloride   Intravenous Once    gabapentin  100 mg Oral TID    clopidogrel  75 mg Oral Daily    amoxicillin potassium and clavulanate  875 mg Oral Q12H     Continuous Infusing Medication:   continuous dose heparin Stopped (12/19/24 0700)    sodium chloride 10 mL/hr at 12/18/24 1354    HYDROmorphone in sodium chloride 0.9%       PRN Medication:  [Transfer Hold] iodixanol    HYDROmorphone    naloxone    diphenhydrAMINE    ondansetron       Microbiology:    No results found for this visit on 12/18/24.    Lab Results   Component Value Date    COVID19 Not Detected 11/19/2024    COVID19 Not Detected 01/04/2023    COVID19 NOT DETECTED 05/28/2021        Assessment/Plan:     23 yo woman h/o vte who had delivery of a baby in 10/24 and complicated by additional dvt and then later development of pelvic hematoma and ivc filter placement. Then complicated by extensive b/l dvt above IVC filter requiring thrombectomy at OSH and now s/p repeat thrombectomy with ivc filter retrieval 12/18    Hypercoagulable state  Pelvic hematoma  S/p thrombectomies X 2, last was 12/18  S/p ivc filter now removed  - hematology following, had APLS+ result 11/20 and given complicated vte history, they would favor further anticoag, possibly coumadin. To discuss further with vascular surgery  - for now on heparin gtt and plavix  - okay for bed to chair and PT.   - CT venogram 12/19 demonstrating no residual thrombus in ivc or iliac veins. There is still mild residual thrombus in common femoral/prox femoral veins    LE pain- presume 2/2 clot history but also has significant spinal stenosis  - plan for restarting gabapentin, she states that this worked effectively in past  - on dilaudid pca as well. Bowel regimen    Rectal sheath hematoma  Persistent mild hydroureteronephrosis of R  collecting system 2/2 distal ureteral compression from hematoma  - will consider discussion with urology        Meseret Valentino MD  [facility]  Pager: [phone]
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
## 2024-12-19 NOTE — PROGRESS NOTES
ICU  Critical Care APRN Progress Note    NAME: Carmela Bell - ROOM: [facility] - MRN: [id number] - Age: 24 year old - :2000    SUBJECTIVE:  No issues overnight.       Current Facility-Administered Medications   Medication Dose Route Frequency    potassium chloride 20 mEq/100mL IVPB premix 20 mEq  20 mEq Intravenous Once    [Transfer Hold] iodixanol (VISIPAQUE) 320 MG/ML injection 100 mL  100 mL Injection ONCE PRN    heparin (Porcine) 06122 units/250mL infusion PE/DVT/THROMBUS CONTINUOUS  200-3,000 Units/hr Intravenous Continuous    clopidogrel (Plavix) tab 75 mg  75 mg Oral Daily    sodium chloride 0.9% infusion   Intravenous Continuous    HYDROmorphone in sodium chloride 0.9% (Dilaudid) 20 mg/100mL PCA premix   Intravenous Continuous    HYDROmorphone 0.4 mg BOLUS FROM PCA  0.4 mg Intravenous Q30 Min PRN    naloxone (Narcan) 0.4 MG/ML injection 0.08 mg  0.08 mg Intravenous Q5 Min PRN    diphenhydrAMINE (Benadryl) 50 mg/mL  injection 12.5 mg  12.5 mg Intravenous Q4H PRN    ondansetron (Zofran) 4 MG/2ML injection 4 mg  4 mg Intravenous Q6H PRN    amoxicillin clavulanate (Augmentin) 875-125 MG per tab 875 mg  875 mg Oral Q12H     OBJECTIVE  Vitals:  /76 (BP Location: Left arm)   Pulse 68   Temp 97.5 °F (36.4 °C) (Temporal)   Resp 15   Ht 175.3 cm (5' 9\")   Wt 274 lb 14.6 oz (124.7 kg)   LMP  (LMP Unknown)   SpO2 100%   Breastfeeding No   BMI 40.60 kg/m²               Physical Exam:    General Appearance: Alert, cooperative, no acute distress, appears stated age  Neck: No JVD  Lungs: Clear to auscultation bilaterally, respirations unlabored  Heart: Regular rate and rhythm, S1 and S2 normal, no murmur, rub or gallop  Abdomen: Soft, non-tender, bowel sounds active   Extremities: Atraumatic, no cyanosis, bilateral LE edema wrapped in dressings,  capillary refill <3 sec.    Pulses: 2+ and symmetric all extremities, bilateral femoral, DP/PT pulses intact with dopplers  Skin: Skin color, texture,  turgor normal for ethnicity, no rashes or lesions, warm and dry  Neurologic: LE weakness--baseline recently PTA    Data this admission:  US VENOUS DOPPLER LEG BILAT - DIAG IMG (CPT=93970)    Result Date: 11/19/2024  CONCLUSION:  Extensive bilateral lower extremity DVT is present extending from the saphenofemoral junctions the distal calves bilaterally.  Please note that there is an inferior vena cava filter with extensive thrombus extending from the IVC to the calf veins.  Consider consultation for thrombectomy, or thrombolysis if the patient is a candidate.  Critical results were discussed with TEJA Awan and Dr. Biswas by Dr. Greenberg at approximately 1900 hrs on 11/19/24.  Read back was performed.    LOCATION:  [place] [id number]   Dictated by (CST): Reji Greenberg MD on 11/19/2024 at 6:57 PM     Finalized by (CST): Reji Greenberg MD on 11/19/2024 at 7:13 PM       XR CHEST AP PORTABLE  (CPT=71045)    Result Date: 11/19/2024  CONCLUSION:  Borderline heart size. No active disease seen.   LOCATION:  [place]      Dictated by (CST): Stanley Mondragon MD on 11/19/2024 at 12:38 PM     Finalized by (CST): Stanley Mondragon MD on 11/19/2024 at 12:39 PM       Labs:  Lab Results   Component Value Date    WBC 5.6 12/19/2024    HGB 8.2 12/19/2024    HCT 25.1 12/19/2024    .0 12/19/2024    CREATSERUM 0.44 12/19/2024    BUN 6 12/19/2024     12/19/2024    K 3.8 12/19/2024     12/19/2024    CO2 29.0 12/19/2024     12/19/2024    CA 7.9 12/19/2024    ALB 2.6 12/19/2024    ALKPHO 62 12/19/2024    BILT 0.3 12/19/2024    TP 5.0 12/19/2024    AST 10 12/19/2024    ALT 16 12/19/2024    .4 12/19/2024    INR 1.29 12/18/2024       Assessment/Plan:      Acute on chronic bilateral iliofemoral DVTs S/p thrombectomy 12/18. History of PE, DVTs s/p severe complications with anticoagulation s/p IVC filter and previous mechanical thrombectomy in 11/2024  -s/p 2 units RBCs intra-op. Hgb down-trending, 1 unit RBC ordered per surgery  -No signs of  active bleeding  -continue heparin gtt  -Plavix  -Vascular checks Q 4 hrs  -IVC filter removed 12/18  -Heme consulted for recs on long-term a/c given history of complications  -Repeat CTA pending post thrombectomy  -TTE pending  -Vascular surgery following    Chronic normocytic anemia  -Hgb with raul-trend, 8.2  -No signs of active bleeding  -1 RBC ordered per surgery  -Daily CBC    Chronic Back Pain  Severe Spinal stenosis at L3/L4 and L4/L5 region LE weakness  -Dilaudid PCA, consider oral agent (Was on Oxy at home but may worsen stenosis)  -Will start gabapentin   -Neurosurgery consulted--does not appear pt has been evaluated in the past when seen by neurology    History rectal sheath hematoma s/p recent pregnancy 10/2024 with post-partum   -Heme consulted given need for anticoagulation  -Monitor CBC    Ear drainage: Concern for OM, history of tympanic tubes   -Augmentin    MARYSOL, non-compliant with CPAP  -monitor    History ITP  -Plts stable        F/E/N:  -Follow lytes and replete prn  -cardiac diet    Proph:  -SCDs  -heparin gtt    Dispo:     Code Status: Full Code  -stable to transfer to floor    Remove rena  -Will place midline and remove CVC    Plan of care discussed with intensivist, Dr. Elian Sigala, ACNP-BC  ICU  Phone  [phone]   Pager [phone]    ICU attending geronimo Diaz doing well in post opperatiev state. CVC was removed midline placed for blood draws. She has a follow up CT venogram showing improvement in her thromus burden bilateralyl persisten mildy right hydro 2/2 post op hematoma.     Plan for heme consult to decide on AC given the history of lupus AC (coumadin/lovenox?)    OM on augemntin otherwise stable and transfer out of ICU    DOS 12/19/24

## 2024-12-19 NOTE — PROGRESS NOTES
Vascular Surgery Progress Note    /79   Pulse 72   Temp 97.6 °F (36.4 °C) (Temporal)   Resp 21   Ht 5' 9\" (1.753 m)   Wt 274 lb 14.6 oz (124.7 kg)   LMP  (LMP Unknown)   SpO2 99%   Breastfeeding No   BMI 40.60 kg/m²     Recent Labs   Lab 12/18/24  1347 12/19/24  0414   RBC 3.20*  3.20* 2.60*   HGB 9.9*  9.9* 8.2*   HCT 30.8*  30.8* 25.1*   MCV 96.3  96.3 96.5   MCH 30.9  30.9 31.5   MCHC 32.1  32.1 32.7   RDW 16.5  16.5 16.5   NEPRELIM 4.91 3.72   WBC 5.9  5.9 5.6   .0  241.0 211.0       Recent Labs   Lab 12/18/24  1347 12/19/24  0414   *  152* 111*   BUN 5*  5* 6*   CREATSERUM 0.57  0.57 0.44*   CA 8.1*  8.1* 7.9*     140 142   K 3.6  3.6 3.8     107 108   CO2 27.0  27.0 29.0       Patient seen and examined.  No issues overnight.  Access sites are flat with no hematoma.  Pressure device is removed off the access sites and dressings put in place.  Okay for patient to get out of bed to chair and to work with physical therapy.  Ace wrap to remain in place.  Arterial line can be removed but would like the central line to maintain attain in place due to difficult access.  Will assess her progress.  She can transfer to floor with telemetry. Will continue heparin drip and Plavix for today.    Kirby Mills MD  [facility]  Vascular Surgery

## 2024-12-19 NOTE — PLAN OF CARE
Assumed care at 1930.  Alert. See flowsheet for further assessment.  On 2L NC.   PCA for pain control -- well managed per patient.   SR/ ST. VSS. Art line present.  Heparin drip per DVT protocol.   BL groin and right neck site -- see documentation for assessment.  Soft diet.  Bedrest.

## 2024-12-19 NOTE — PLAN OF CARE
Assumed care of patient after RN report. Patient alert and oriented x4. On RA vs 2L NC. IS given. SR on monitor. A line removed. TLC removed. Cantrell removed. Up to bathroom with assistance. BLE edematous. Groin and neck sites soft, C/D/I. Minimal tenderness to palpation. Pedal pulses with doppler. Thigh high ace wrap. Family updated on POC. Tx orders. See flowsheet for full assessment.

## 2024-12-20 VITALS
SYSTOLIC BLOOD PRESSURE: 108 MMHG | RESPIRATION RATE: 24 BRPM | TEMPERATURE: 98 F | DIASTOLIC BLOOD PRESSURE: 69 MMHG | HEIGHT: 69 IN | OXYGEN SATURATION: 100 % | WEIGHT: 265.5 LBS | HEART RATE: 97 BPM | BODY MASS INDEX: 39.32 KG/M2

## 2024-12-20 LAB
ALBUMIN SERPL-MCNC: 2.8 G/DL (ref 3.2–4.8)
ALBUMIN/GLOB SERPL: 1.2 {RATIO} (ref 1–2)
ALP LIVER SERPL-CCNC: 68 U/L
ALT SERPL-CCNC: <7 U/L
ANION GAP SERPL CALC-SCNC: 4 MMOL/L (ref 0–18)
APTT PPP: 65.6 SECONDS (ref 23–36)
APTT PPP: 76.3 SECONDS (ref 23–36)
AST SERPL-CCNC: 10 U/L (ref ?–34)
B2 GLYCOPROT1 IGG SERPL IA-ACNC: 1 U/ML (ref ?–7)
B2 GLYCOPROT1 IGM SERPL IA-ACNC: <2.4 U/ML (ref ?–7)
BASOPHILS # BLD AUTO: 0.02 X10(3) UL (ref 0–0.2)
BASOPHILS NFR BLD AUTO: 0.4 %
BILIRUB SERPL-MCNC: 0.3 MG/DL (ref 0.3–1.2)
BLOOD TYPE BARCODE: 5100
BLOOD TYPE BARCODE: 5100
BUN BLD-MCNC: <5 MG/DL (ref 9–23)
CALCIUM BLD-MCNC: 8.4 MG/DL (ref 8.7–10.4)
CARDIOLIPIN IGG SERPL-ACNC: 0.9 GPL (ref ?–10)
CARDIOLIPIN IGM SERPL-ACNC: <0.9 MPL (ref ?–10)
CHLORIDE SERPL-SCNC: 105 MMOL/L (ref 98–112)
CO2 SERPL-SCNC: 33 MMOL/L (ref 21–32)
CREAT BLD-MCNC: 0.5 MG/DL
EGFRCR SERPLBLD CKD-EPI 2021: 133 ML/MIN/1.73M2 (ref 60–?)
EOSINOPHIL # BLD AUTO: 0.19 X10(3) UL (ref 0–0.7)
EOSINOPHIL NFR BLD AUTO: 3.6 %
ERYTHROCYTE [DISTWIDTH] IN BLOOD BY AUTOMATED COUNT: 16.9 %
GLOBULIN PLAS-MCNC: 2.3 G/DL (ref 2–3.5)
GLUCOSE BLD-MCNC: 100 MG/DL (ref 70–99)
HCT VFR BLD AUTO: 26.8 %
HGB BLD-MCNC: 8.7 G/DL
IMM GRANULOCYTES # BLD AUTO: 0.04 X10(3) UL (ref 0–1)
IMM GRANULOCYTES NFR BLD: 0.8 %
INR BLD: 1.04 (ref 0.85–1.16)
LA 3 SCREEN W REFLEX-IMP: NEGATIVE
LYMPHOCYTES # BLD AUTO: 1.55 X10(3) UL (ref 1–4)
LYMPHOCYTES NFR BLD AUTO: 29.7 %
MCH RBC QN AUTO: 31.1 PG (ref 26–34)
MCHC RBC AUTO-ENTMCNC: 32.5 G/DL (ref 31–37)
MCV RBC AUTO: 95.7 FL
MONOCYTES # BLD AUTO: 0.56 X10(3) UL (ref 0.1–1)
MONOCYTES NFR BLD AUTO: 10.7 %
NEUTROPHILS # BLD AUTO: 2.86 X10 (3) UL (ref 1.5–7.7)
NEUTROPHILS # BLD AUTO: 2.86 X10(3) UL (ref 1.5–7.7)
NEUTROPHILS NFR BLD AUTO: 54.8 %
PLATELET # BLD AUTO: 184 10(3)UL (ref 150–450)
POTASSIUM SERPL-SCNC: 3.9 MMOL/L (ref 3.5–5.1)
POTASSIUM SERPL-SCNC: 3.9 MMOL/L (ref 3.5–5.1)
PROT SERPL-MCNC: 5.1 G/DL (ref 5.7–8.2)
PROTHROMBIN TIME: 13.7 SECONDS (ref 11.6–14.8)
PTT (HEPZYME): 29.3 SECONDS (ref 23–36)
RBC # BLD AUTO: 2.8 X10(6)UL
SCREEN DRVVT: 1.14 S (ref 0–1.29)
SCREEN DRVVT: NEGATIVE S
SODIUM SERPL-SCNC: 142 MMOL/L (ref 136–145)
STACLOT LA DELTA: 0.4 SECONDS (ref ?–8)
UNIT VOLUME: 350 ML
UNIT VOLUME: 350 ML
WBC # BLD AUTO: 5.2 X10(3) UL (ref 4–11)

## 2024-12-20 RX ORDER — CLOPIDOGREL BISULFATE 75 MG/1
75 TABLET ORAL DAILY
Qty: 30 TABLET | Refills: 1 | Status: SHIPPED | OUTPATIENT
Start: 2024-12-21 | End: 2024-12-20

## 2024-12-20 RX ORDER — ENOXAPARIN SODIUM 150 MG/ML
1 INJECTION SUBCUTANEOUS ONCE
Status: COMPLETED | OUTPATIENT
Start: 2024-12-20 | End: 2024-12-20

## 2024-12-20 RX ORDER — ENOXAPARIN SODIUM 150 MG/ML
1 INJECTION SUBCUTANEOUS 2 TIMES DAILY
Qty: 24.6 ML | Refills: 0 | Status: ON HOLD | OUTPATIENT
Start: 2024-12-20 | End: 2025-01-04

## 2024-12-20 RX ORDER — CLOPIDOGREL BISULFATE 75 MG/1
75 TABLET ORAL DAILY
Qty: 30 TABLET | Refills: 1 | Status: ON HOLD | OUTPATIENT
Start: 2024-12-21

## 2024-12-20 RX ORDER — GABAPENTIN 100 MG/1
100 CAPSULE ORAL 3 TIMES DAILY
Qty: 90 CAPSULE | Refills: 0 | Status: ON HOLD | OUTPATIENT
Start: 2024-12-20

## 2024-12-20 RX ORDER — HYDROCODONE BITARTRATE AND ACETAMINOPHEN 5; 325 MG/1; MG/1
1 TABLET ORAL EVERY 6 HOURS PRN
Status: DISCONTINUED | OUTPATIENT
Start: 2024-12-20 | End: 2024-12-20

## 2024-12-20 RX ORDER — GABAPENTIN 100 MG/1
100 CAPSULE ORAL 3 TIMES DAILY
Qty: 90 CAPSULE | Refills: 0 | Status: SHIPPED | OUTPATIENT
Start: 2024-12-20 | End: 2024-12-20

## 2024-12-20 NOTE — PLAN OF CARE
Alert and oriented.  Denies dyspnea and cough as well as nausea.  Encouraged to continue to use incentive spirometer (states she is) 10x hourly while awake and explained how this helps prevent pneumonia and help her stay off O2.  Note she dips into upper 80's with sleep - presently with 1 L NC.  Denies nausea although I note poor appetite.  Purewick intact with adequate clear yellow urine output noted.  Bilateral neck and groin dressings with gauze under intact tegaderm, sites are all soft with no hematoma.  ACE wraps are on from feet to upper thigh.  2+ lower extremity edema noted - states has improved from when she was admitted.  Pedal pulses by doppler.  States pain is controlled at 5/10 (knees down) and 3/10 (incisions) with Dilaudid PCA. Heparin gtt per PE/DVT protocol with therapeutic PTT.  Midline intact.  Admission database completed.  See assessments and flowsheets for further data.  Awaiting CTU bed.      To [phone] via myself via bed with all belongings at 0735.  Tele NSR on 1L NC.

## 2024-12-20 NOTE — BH PROGRESS NOTE
Attempted to meet with patient at bedside for follow-up. PT in with patient. Writer not able to follow-up.

## 2024-12-20 NOTE — DISCHARGE SUMMARY
.DM Internal Medicine Discharge Summary    Patient ID:  Carmela Bell  [id number]  24 year old  7/30/2000    Admit date: 12/18/2024  Discharge date and time: 12/20/2024  Attending Physician: Meseret Valentino MD  Primary Care Physician: Deven Sharif DO     Admit Dx: inferior vena cava thrombosis, acute deep vein thrombosis of both iliofemoral veins, hematoma of         sheath, intitial encounter  Inferior vena caval thrombosis (HCC)    Primary Diagnosis at discharge from Hospital: Other: 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    Discharged Condition: good    Disposition: home    Important follow up:  Jaamal Koroma MD  [address]  [address]  [facility] [address] [phone]    Follow up in 1 week(s)      Deven Sharif DO  [address]  [facility] [address] [phone]    Follow up in 1 week(s)          Reason for admission and hospital course:   24-year-old woman with history of venous thromboembolism who had delivery of baby in October complicated by additional DVT and then later development of pelvic hematoma and IVC filter placement.  Then subsequently complicated by extensive bilateral DVTs above IVC filter requiring thrombectomy at outside hospital and now status post repeat thrombectomy with IVC filter retrieval on December 18 by vascular surgery.  Patient was monitored in ICU following the operation.  CT venogram on December 19 demonstrated no residual thrombus in IVC or iliac veins.  There is still mild residual thrombus in common femoral and proximal femoral veins.  Hematology was consulted.  Patient had positive antiphospholipid antibody result and given complicated venous thromboembolism history was  recommended to continue with anticoagulation, after discussion it was felt that Lovenox injections would be best option.  It was noted that patient's weight was 120 kg and therefore Lovenox dose was adjusted to 120 mg twice daily for discharge.    Patient also noted to have significant lower extremity pain that required Dilaudid PCA postop.  As swelling improved after the procedure, so did some of the discomfort.  The PCA was stopped and she did not seem to require significant oral pain medicine.  She was restarted on gabapentin, she had taken in the past as well, as she is noted to have significant spinal stenosis and this was believed to be contributing to some of her lower extremity pain.    Of note on imaging there was some mild hydroureteronephrosis of the right collecting system secondary to distal ureteral compression from pelvic hematoma.  This seems to be the result of the previous known rectus sheath hematoma that she had incurred.  I did discuss this with patient and once some of the clotting and hematoma issues have be gone to resolve, it would probably be an ideal time to reassess the ureter on that side with repeat imaging.  Otherwise her kidney function was normal and she was urinating without difficulty.    Patient was given Augmentin for possible otitis media in the ICU, this was continued for a course after discharge.    Day of discharge Exam  Vitals:    12/20/24 1300   BP:    Pulse: 97   Resp: 24   Temp:      Exam on day of discharge:  Gen: No acute distress, alert and oriented  CV: RRR, +s1/s2  Lungs: CTAB, good respiratory effort  Abdomen: s/nt/nd  Ext: Moves all 4 extremities, no c/c/e  Neuro: CN Intact, no focal deficits    Discharge meds     Medication List        START taking these medications      amoxicillin clavulanate 875-125 MG Tabs  Commonly known as: Augmentin  Take 1 tablet by mouth 2 (two) times daily for 5 days.     clopidogrel 75 MG Tabs  Commonly known as: Plavix  Take 1 tablet  (75 mg total) by mouth daily.  Start taking on: December 21, 2024     enoxaparin 120 MG/0.8ML Sosy  Commonly known as: Lovenox  Inject 0.82 mL (123 mg total) into the skin 2 (two) times daily for 30 doses.  Replaces: Lovenox 100 MG/ML Sosy     gabapentin 100 MG Caps  Commonly known as: Neurontin  Take 1 capsule (100 mg total) by mouth 3 (three) times daily.            CHANGE how you take these medications      Sennosides 17.2 MG Tabs  What changed: Another medication with the same name was removed. Continue taking this medication, and follow the directions you see here.            CONTINUE taking these medications      cyanocobalamin 1000 MCG Tabs  Take 1 tablet (1,000 mcg total) by mouth daily.     folic acid 1 MG Tabs  Commonly known as: Folvite  Take 1 tablet (1 mg total) by mouth daily.     lidocaine-menthol 4-1 % Ptch     oxyCODONE HCl 5 MG Caps  Commonly known as: OXY-IR     Polyethylene Glycol 3350 17 g Pack  Commonly known as: MIRALAX            STOP taking these medications      continuous dose heparin 100 Units/mL Soln  Commonly known as: Porcine     Lovenox 100 MG/ML Sosy  Generic drug: enoxaparin  Replaced by: enoxaparin 120 MG/0.8ML Sosy     vancomycin in sodium chloride 0.9% 1.75 g/500mL Soln  Commonly known as: Vancocin               Where to Get Your Medications        These medications were sent to [id number] - [place] - [address] AT [facility], [phone], [phone] [address] [place] [address]      Phone: [phone]   amoxicillin clavulanate 875-125 MG Tabs  clopidogrel 75 MG Tabs  enoxaparin 120 MG/0.8ML Sosy  gabapentin 100 MG Caps       Consults: IP CONSULT TO HOSPITALIST  IP CONSULT TO CRITICAL CARE INTENSIVIST  IP CONSULT TO HEMATOLOGY  IP CONSULT TO SOCIAL WORK  Radiology: CT VENOGRAPHY ABDOMEN PELVIS (W/ CONTRAST) (CPT=74177)    Result Date: 12/19/2024  PROCEDURE:  CT VENOGRAPHY ABDOMEN PELVIS (W/ CONTRAST) (CPT=74177)  COMPARISON:   [facility] , CT, CT ABDOMEN+PELVIS(CONTRAST ONLY)(CPT=74177), 11/18/2024, 4:49 PM.  INDICATIONS:  post thrombectomy  TECHNIQUE:  Axial helical acquisitions are obtained from through the abdomen and pelvis after bolus intravenous nonionic contrast administration.  Images of the right lower quadrant reconstructed at 2.5 mm. Coronal MPR imaging was obtained.  Dose reduction techniques were used. Dose information is transmitted to the ACR (American College of Radiology) NRDR (National Radiology Data Registry) which includes the Dose Index Registry.  PATIENT STATED HISTORY:(As transcribed by Technologist)  post thrombectomy   CONTRAST USED:  150cc of Isovue 370  FINDINGS:  The diagnostic quality of examination is degraded by suboptimal bolus timing.  Technologist reports technical difficulty with IV line resulting in delayed bolus timing.   LIVER:  No enlargement, atrophy, abnormal density, or significant focal lesion.  BILIARY:  Cholecystectomy.  Normal bile ducts. PANCREAS:  No lesion, fluid collection, ductal dilatation, or atrophy.  SPLEEN:  No enlargement or focal lesion.  KIDNEYS:  Normal renal morphology and enhancement.  Mild hydroureteronephrosis of the right collecting system secondary to distal ureteral compression from evolving hematoma of the right hemipelvis, described below.  The extent of collecting system dilation is similar compared to 11/18/2024. ADRENALS:  No mass or enlargement.  AORTA/VASCULAR:  No abdominal aortic aneurysm.  Suboptimal opacification of the IVC secondary to bolus timing.  Interval removal of IVC filter without residual intravascular thrombus of the IVC or iliac veins noted.  However, there is mild fusiform expansion and central hypodensity of the distal common femoral and proximal femoral veins bilaterally such that there is likely residual intravascular thrombus (series 6, image 34, 31) RETROPERITONEUM:  No mass or adenopathy.  BOWEL/MESENTERY:  Partial gastrectomy.  No evidence of  bowel inflammation or obstruction. ABDOMINAL WALL:  Diffuse body wall edema with small subcutaneous hematomas of the right and left groin, each measuring approximately 5 x 3 cm (series 3, image 137). URINARY BLADDER:  The bladder is displaced leftward secondary to evolving encapsulated hematoma measuring approximately 11 x 9 x 11 cm, previously 14 x 11 x 12 cm. PELVIC NODES:  No adenopathy.  PELVIC ORGANS:  No visible mass.  Pelvic organs appropriate for patient age.  BONES:  No bony lesion or fracture.  LUNG BASES:  Interstitial pulmonary edema with heterogeneous consolidation of the left lower lobe, possibly atelectatic or inflammatory.  Small right pleural effusion. OTHER:  Negative.             CONCLUSION:   1. The diagnostic quality of the examination is degraded by suboptimal bolus timing related to technical difficulties with IV line.  2. Within this limitation, there is no demonstrable residual thrombus of the IVC or iliac veins.  However, there is persistent expansion and central hypodensity of the common femoral and proximal femoral veins bilaterally consistent with a mild volume of  residual intravascular thrombus.  This is decreased in volume compared to 11/18/2024.  3. Persistent mild hydroureteronephrosis of the right collecting system secondary to distal ureteral compression from evolving hematoma of the right hemipelvis.  The hematoma is decreased in size compared to 11/18/2024, dimensions as above.   4. Small subcutaneous hematomas of the right and left groin, each measuring 5 x 3 cm.  5. Interstitial pulmonary edema with heterogeneous consolidation of the left lower lobe which may be atelectatic or inflammatory.  Correlate with upper respiratory symptoms.    LOCATION:  [id number]   Dictated by (CST): Tika Zelaya MD on 12/19/2024 at 12:21 PM     Finalized by (CST): Tika Zelaya MD on 12/19/2024 at 12:36 PM       CARD ECHO 2D DOPPLER (CPT=93306)    Result Date: 12/19/2024  Transthoracic Echocardiogram  Name:Carmela Bell Date: 2024 :  2000 Ht:  (69in)  BP: 96 / 66 MRN:  [id number]    Age:  24years    Wt:  (274lb) HR: 64bpm Loc:  [facility]       Gndr: F          BSA: 2.36m^2 Sonographer: Lulu VERNON Ordering:    Elvia Clay Consulting:  Leandro Weber ---------------------------------------------------------------------------- History/Indications:  Blood clots ---------------------------------------------------------------------------- Procedure information:  A transthoracic complete 2D study was performed. Additional evaluation included M-mode, complete spectral Doppler, and color Doppler.  Patient status:  Inpatient.  Location:  Bedside.    Comparison was made to the study of 2024.    This was a routine study. Transthoracic echocardiography for ventricular function evaluation and assessment of valvular function. Image quality was adequate. ECG rhythm:   Normal sinus ---------------------------------------------------------------------------- Conclusions: 1. Left ventricle: The cavity size was normal. Wall thickness was normal.    Systolic function was normal. The estimated ejection fraction was 60-65%,    by visual assessment. No diagnostic evidence for regional wall motion    abnormalities. Left ventricular diastolic function parameters were    normal. 2. Left atrium: The left atrial volume was normal. 3. Pulmonary arteries: Systolic pressure was at the upper limits of normal,    in the range of 30mm Hg to 35mm Hg. Estimated pulmonary artery diastolic    pressure was 7mm Hg. Impressions:  This study is compared with previous dated 2024: * ---------------------------------------------------------------------------- * Findings: Left ventricle:  The cavity size was normal. Wall thickness was normal. Systolic function was normal. The estimated ejection fraction was 60-65%, by visual assessment. No diagnostic evidence for regional wall motion abnormalities. Left ventricular  diastolic function parameters were normal. Left atrium:  The left atrial volume was normal. Right ventricle:  The cavity size was normal. Systolic function was normal. Right atrium:  The atrium was normal in size. Mitral valve:  The valve was structurally normal. Leaflet separation was normal.  Doppler:  Transvalvular velocity was within the normal range. There was no evidence for stenosis. There was trivial regurgitation. Aortic valve:  The valve was structurally normal. The valve was trileaflet. Cusp separation was normal.  Doppler:  Transvalvular velocity was within the normal range. There was no evidence for stenosis. There was no significant regurgitation. Tricuspid valve:  The valve is structurally normal. Leaflet separation was normal.  Doppler:  Transvalvular velocity was within the normal range. There was no evidence for stenosis. There was mild regurgitation. Pulmonic valve:    There was no significant valve disease.    Doppler: Transvalvular velocity was within the normal range. There was no evidence for stenosis. There was trivial regurgitation. Pericardium:   There was no pericardial effusion. Aorta: Aortic root: The aortic root was normal-sized. Ascending aorta: The ascending aorta was normal. Pulmonary arteries: Systolic pressure was at the upper limits of normal, in the range of 30mm Hg to 35mm Hg. Estimated pulmonary artery diastolic pressure was 7mm Hg. Systemic veins: Inferior vena cava: The IVC was normally collapsible and normal-sized. ---------------------------------------------------------------------------- Measurements  Left ventricle         Value        Ref       11/20/2024  IVS thickness, ED,     0.9   cm     0.6 - 0.9 1.0  PLAX  LV ID, ED, PLAX        4.0   cm     3.8 - 5.2 4.4  LV ID, ES, PLAX        2.7   cm     2.2 - 3.5 3.0  LV PW thickness,   (H) 1.0   cm     0.6 - 0.9 0.9  ED, PLAX  LV ejection            61    %      54 - 74   61  fraction  LV e', lateral         18.2  cm/sec  >=10.0    11.6  LV E/e', lateral       5            <=13      6  LV e', medial          11.9  cm/sec >=7.0     9.5  LV E/e', medial        8            --------- 8  LV e', average         15.1  cm/sec --------- 10.5  LV E/e', average       6            <=14      7  Ascending aorta        Value        Ref       11/20/2024  Ascending aorta        3.1   cm     1.9 - 3.5 3.3  ID, A-P, ED  Left atrium            Value        Ref       11/20/2024  LA volume, S           50    ml     22 - 52   39  LA volume/bsa, S       21    ml/m^2 16 - 34   16  LA volume, ES, 1-p     52    ml     22 - 52   42  A4C  LA volume, ES, 1-p     48    ml     22 - 52   31  A2C  LA volume, ES, A/L     52    ml     --------- 41  LA volume/bsa, ES,     22    ml/m^2 16 - 34   17  A/L  Mitral valve           Value        Ref       11/20/2024  Mitral E-wave peak     0.92  m/sec  --------- 0.71  velocity  Mitral A-wave peak     0.41  m/sec  --------- 0.54  velocity  Mitral peak            3     mm Hg  --------- ----------  gradient, D  Mitral E/A ratio,      2.2          --------- 1.3  peak  Pulmonary artery       Value        Ref       11/20/2024  PA pressure, S, DP     32    mm Hg  --------- ----------  PA pressure, ED,       7     mm Hg  --------- ----------  DP  Tricuspid valve        Value        Ref       11/20/2024  Tricuspid regurg       2.71  m/sec  <=2.8     1.6  peak velocity  Tricuspid peak         29    mm Hg  --------- 10  RV-RA gradient  Systemic veins         Value        Ref       11/20/2024  Estimated CVP          3     mm Hg  --------- 3  Right ventricle        Value        Ref       11/20/2024  RV pressure, S, DP     32    mm Hg  --------- 13  Pulmonic valve         Value        Ref       11/20/2024  Pulmonic regurg        1     m/sec  --------- ----------  velocity, ED  Pulmonic regurg        4     mm Hg  --------- ----------  gradient, ED Legend: (L)  and  (H)  robin values outside specified reference range.  ---------------------------------------------------------------------------- Prepared and electronically signed by Demetrio Llanes MD 12/19/2024 11:40     Operative Procedures: Procedure(s) (LRB):  PERCUTANEOUS THROMBECTOMY OF INFERIOR VENA CAVA, BILATERAL ILIOFEMORAL VEINS, BILATERAL POPLITEAL VEINS WITH RIGHT INTERNAL JUGULAR ACCESS, BILATERAL SUPERFICIAL FEMORAL VEIN ACCESS, POST TIBIAL VEIN ACCESS, CELL SAVER (Bilateral)  ANGIOGRAM ADD-ON (Bilateral)  Activity: activity as tolerated  Diet: regular diet  Wound Care: none needed  Code Status: Full Code  O2: none  Total Time Coordinating Care: 35 minutes Patient had opportunity to ask questions and state understand and agree with therapeutic plan as outlined    I reconciled current and discharge medications on day of discharge.

## 2024-12-20 NOTE — OCCUPATIONAL THERAPY NOTE
OCCUPATIONAL THERAPY EVALUATION - INPATIENT    Room Number: 7619/7619-A  Evaluation Date: 2024     Type of Evaluation: Initial  Presenting Problem: BLE thrombectomies    Physician Order: IP Consult to Occupational Therapy  Reason for Therapy:  ADL/IADL Dysfunction and Discharge Planning  History related to admission: Patient with h/o DVT , ITP admitted from home s/p  delivery with BLE pain.  Underwent thrombectomies and IVC filter removal on 24.    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, morbid obesity      Procedure Performed:   US percutaneous access left proximal superficial femoral vein - 13 and 16 fr inari sheath   US percutaneous access right distal common femoral vein - 13 and 16 fr inari sheath  US percutaneous access right internal jugular vein - 20 Slovenian protrieve sheath   Inferior vena cava filter removal  Percutaneous thrombectomy of inferior vena cava, left and right common iliac veins, left and right external iliac vein with inari clot triever devices  IVUS IVC and iliac veins        OCCUPATIONAL THERAPY ASSESSMENT   Patient is a 24 year old female admitted on 2024 with Presenting Problem: BLE thrombectomies. Co-Morbidities : post-partum, ITP, asthma, depression, MARYSOL, anxiety, DVT/PE  Patient is currently functioning below baseline with lower body dressing, bed mobility, transfers, dynamic standing balance, and performing household tasks.  Prior to admission, patient's baseline is independent, working, driving.  Patient met all OT goals at supervision to mod assist level as she was receiving assistance prior to admission.  Patient reports no further questions/concerns at this time.     Patient will benefit from continued skilled OT Services with no additional skilled  services but increased support at home    Recommendations for nursing staff:   Transfers: 1 person, no device, SBA  Toileting location: Toilet    EVALUATION SESSION:  Patient at start of session: seated on recliner    FUNCTIONAL TRANSFER ASSESSMENT  Sit to Stand: Chair  Chair: Supervision  Toilet Transfer: Supervision    BED MOBILITY     BALANCE ASSESSMENT  Static Standing: Supervision    FUNCTIONAL ADL ASSESSMENT  LB Dressing Seated: Moderate Assist  LB Dressing Standing: Modified Independent  Toileting Seated: Independent  Toileting Standing: Modified Independent    ACTIVITY TOLERANCE: stable on room air                         O2 SATURATIONS  Oxygen Therapy  SPO2% on Room Air at Rest: 99    COGNITION  Overall Cognitive Status:  WFL - within functional limits    COGNITION ASSESSMENTS     Upper Extremity:   ROM: within functional limits   Strength: is within functional limits   Coordination:  Gross motor: wfl  Fine motor: wfl  Sensation: denied UE deficits    EDUCATION PROVIDED  Patient Education : Role of Occupational Therapy; Plan of Care; DME Recommendations; Functional Transfer Techniques  Patient's Response to Education: Verbalized Understanding    Equipment used: hip kit  Demonstrates functional use    Therapist comments:   OT instructed patient on benefit of hip kit items for lower body dressing. oT demonstrated via simulation on use of dressing stick and sock aide as patient did not want to remove socks. Patient voiced understanding and was able to use reacher to thread Depends with MIN A needed due to fabric sticking on socks; pulled up pants over waist with SBA. Toileting completed with modified independence . Patient noted to have increased pain with sit to stand/transfers. OT educated patient on types of clothing to minimize irritation to incisions. Understanding voiced.     Patient End of Session: Hospital anti-slip socks;All patient questions and concerns addressed;RN aware of session/findings;Call  light within reach;Needs met    OCCUPATIONAL PROFILE    HOME SITUATION  Type of Home: House  Home Layout: Multi-level  Lives With: Family;Parent(s)    Toilet and Equipment: Comfort height toilet  Shower/Tub and Equipment: Tub-shower combo;Shower chair       Occupation/Status: surgical tech at [employer]     Drives: Yes       Prior Level of Function: Typically independent with all mobility and ADLs, works at [employer] as a surgical tech. Since delivering her baby, patient has needed assistance from family  ( brother, mother) for lower body dressing and for moving BLEs into/out of bed, car, and shower ( tub) . Patient reported that her mother will be continuing to assist at home.    SUBJECTIVE  participatory    PAIN ASSESSMENT  Ratin  Location: BLEs       OBJECTIVE  Precautions: Other (Comment) (BLE incisions)  Fall Risk: Standard fall risk    WEIGHT BEARING RESTRICTION       AM-PAC ‘6-Clicks’ Inpatient Daily Activity Short Form  -   Putting on and taking off regular lower body clothing?: A Little  -   Bathing (including washing, rinsing, drying)?: A Little  -   Toileting, which includes using toilet, bedpan or urinal? : A Little  -   Putting on and taking off regular upper body clothing?: None  -   Taking care of personal grooming such as brushing teeth?: None  -   Eating meals?: None    AM-PAC Score:  Score: 21  Approx Degree of Impairment: 32.79%  Standardized Score (AM-PAC Scale): 44.27    ADDITIONAL TESTS     NEUROLOGICAL FINDINGS      PLAN   Patient has been evaluated and presents with no skilled Occupational Therapy needs at this time.  Patient discharged from Occupational Therapy services.  Please re-order if a new functional limitation presents during this admission.       Patient Evaluation Complexity Level:   Occupational Profile/Medical History MODERATE - Expanded review of history including review of medical or therapy record   Specific performance deficits impacting engagement in  ADL/IADL MODERATE  3 - 5 performance deficits   Client Assessment/Performance Deficits MODERATE - Comorbidities and min to mod modifications of tasks    Clinical Decision Making LOW - Analysis of occupational profile, problem-focused assessments, limited treatment options    Overall Complexity LOW     OT Session Time: 28 minutes  Self-Care Home Management: 25 minutes

## 2024-12-20 NOTE — PROGRESS NOTES
Vascular Surgery Progress Note    No acute events overnight. Patient states her legs feel much better and are a lot less swollen. She is tolerating a diet. Has been walking with PT. Bilateral lower extremities are soft, remain swollen. Access sites are soft with no hematoma.   Discussed in detail with patient   - will stop PCA today and transition to oral pain medications   - will need to discharge on therapeutic lovenox and plavix   - possible home today pending pain control    Essie Bee MD  12/20/24  8:46 AM

## 2024-12-20 NOTE — PHYSICAL THERAPY NOTE
PHYSICAL THERAPY EVALUATION - INPATIENT     Room Number: 7619/7619-A  Evaluation Date: 12/20/2024  Type of Evaluation: Initial  Physician Order: PT Eval and Treat    Presenting Problem: Extensive DVT in BLE and inferior venacava, s/p thrombectomy at OSH with residual clots. Now s/p DVT thrombectomy and IVC filter removal 12/18  Co-Morbidities : chronic AC, recent pregnancy, DVT  Reason for Therapy: Mobility Dysfunction and Discharge Planning    PHYSICAL THERAPY ASSESSMENT   Patient is a 24 year old female admitted 12/18/2024 for Extensive DVT in BLE and inferior venacava, s/p thrombectomy at OSH with residual clots. Now s/p DVT thrombectomy and IVC filter removal 12/18 .  Prior to admission, patient's baseline is independent, using RW since November admission.  Patient is currently functioning near baseline with bed mobility, transfers, gait, and stair negotiation.  Patient is requiring stand-by assist as a result of the following impairments: decreased endurance/aerobic capacity, pain, impaired static and dynamic balance, impaired motor planning, decreased muscular endurance, medical status, and limited BLE ROM.  Physical Therapy will continue to follow for duration of hospitalization.    Patient will benefit from continued skilled PT Services at discharge to promote prior level of function and safety with additional support and return home with home health PT.    PLAN DURING HOSPITALIZATION  Nursing Mobility Recommendation : 1 Assist     PT Treatment Plan: Bed mobility;Body mechanics;Endurance;Energy conservation;Patient education;Family education;Gait training;Balance training;Transfer training;Strengthening  Rehab Potential : Good  Frequency (Obs): 3x/week     CURRENT GOALS    Goal #1 Patient is able to demonstrate supine - sit EOB @ level: supervision     Goal #2 Patient is able to demonstrate transfers EOB to/from Chair/Wheelchair at assistance level: supervision     Goal #3 Patient is able to ambulate 150  feet with assist device: walker - rolling at assistance level: supervision     Goal #4 Pt able to ascend and descend 8 stairs with B railings and CGA   Goal #5    Goal #6    Goal Comments: Goals established on 2024      PHYSICAL THERAPY MEDICAL/SOCIAL HISTORY  History related to current admission: Patient is a 24 year old female admitted on 2024 from home for Extensive DVT in BLE and inferior venacava, s/p thrombectomy at OSH with residual clots. Now s/p DVT thrombectomy and IVC filter removal .    HOME SITUATION  Type of Home: House  Home Layout: Multi-level  Stairs to Enter : 3   Railing: Yes    Stairs to Bedroom: 8    Railing: Yes    Lives With: Family;Parent(s)    Drives: Yes          Prior Level of Gile: Pt typically indep with ADLs and mobility. Has been using a RW since her November hospital admission. Upon that discharge her brother had to assist her into the house. Family helps with lifting her BLEs during shower and car transfers, also helps with dressing. Sleeps in a recliner. Denies falls.     Previous Admissions:   -2024: Sepsis   10/: pelvic hematoma, post partum  10/25-10/28/2024: pregnancy     SUBJECTIVE  \"It's the pain mostly\"     OBJECTIVE  Precautions: None  Fall Risk: Standard fall risk    WEIGHT BEARING RESTRICTION     PAIN ASSESSMENT  Ratin  Location: LLE  Management Techniques: Activity promotion    COGNITION  Overall Cognitive Status:  WFL - within functional limits    RANGE OF MOTION AND STRENGTH ASSESSMENT  Upper extremity ROM and strength are within functional limits via observation    Lower extremity ROM is limited bilaterally due to surgery, pain, and ace bandages.     Lower extremity strength is within functional limits; grossly 4 to 5/5 function limited by pain and ROM    BALANCE  Static Sitting: Fair +  Dynamic Sitting: Fair +  Static Standing: Fair -  Dynamic Standing: Poor +    ADDITIONAL TESTS                                     ACTIVITY TOLERANCE  Pulse: 77  Heart Rate Source: Monitor                   O2 WALK  Oxygen Therapy  SPO2% on Room Air at Rest: 98    NEUROLOGICAL FINDINGS                        AM-PAC '6-Clicks' INPATIENT SHORT FORM - BASIC MOBILITY  How much difficulty does the patient currently have...  Patient Difficulty: Turning over in bed (including adjusting bedclothes, sheets and blankets)?: A Little   Patient Difficulty: Sitting down on and standing up from a chair with arms (e.g., wheelchair, bedside commode, etc.): A Little   Patient Difficulty: Moving from lying on back to sitting on the side of the bed?: A Little   How much help from another person does the patient currently need...   Help from Another: Moving to and from a bed to a chair (including a wheelchair)?: A Little   Help from Another: Need to walk in hospital room?: A Little   Help from Another: Climbing 3-5 steps with a railing?: A Little     AM-PAC Score:  Raw Score: 18   Approx Degree of Impairment: 46.58%   Standardized Score (AM-PAC Scale): 43.63   CMS Modifier (G-Code): CK    FUNCTIONAL ABILITY STATUS  Gait Assessment   Functional Mobility/Gait Assessment  Gait Assistance: Supervision  Distance (ft): 60  Assistive Device: Rolling walker  Pattern:  (guarded, knees in extension)  Stairs: Stoop/curb  Stoop/Curb: x2 reps, railing and contra UE support from PT    Skilled Therapy Provided     Bed Mobility:  Rolling: NT  Supine to sit: CGA HOB elevated   Sit to supine: NT     Transfer Mobility:  Sit to stand: supervision   Stand to sit: supervision  Gait = supervision    Therapist's Comments: RN cleared for session. Pt agreeable for therapy, received supine. Friend present for session. Pt trained on proper LE sequencing for OOB mobility, usage of HOB elevation for assistance. Pt trained on sit<>stand with push off from bed with BUEs. Pt encouraged continued use of RW for optimal support and balance. Trained on \"up with good, down with bad\" for stairs. Pt  with increased nausea and dizziness following session, one episode of emesis with improvement of symptoms following. RN notified. Instructed to call for nursing staff for any needs and OOB mobility.     Exercise/Education Provided:  Bed mobility  Body mechanics  Energy conservation  Functional activity tolerated  Gait training  Posture  Strengthening  Transfer training    Patient End of Session: Up in chair;Needs met;Call light within reach;RN aware of session/findings;Bracing education provided per handout;All patient questions and concerns addressed;Hospital anti-slip socks;Alarm set;Family present;Discussed recommendations with /      Patient Evaluation Complexity Level:  History High - 3 or more personal factors and/or co-morbidities   Examination of body systems Low -  addressing 1-2 elements   Clinical Presentation Low- Stable   Clinical Decision Making Low Complexity       PT Session Time: 30 minutes  Gait Training: 15 minutes  Therapeutic Activity: 10 minutes

## 2024-12-20 NOTE — PLAN OF CARE
Pt Aox4  Pt complains of incisional pain    -pca dc and transitioned to PO meds  RA  NSR  Pt worked with PT and tolerated well  Heparin gtt   -turned off and transitioned to lovenox.  Discharge planning   -iv and tele removed    -went over the importance of meds and follow up   -pt has all belongings   All of pt and family question answered

## 2024-12-20 NOTE — CM/SW NOTE
12/20/24 1530   CM/SW Referral Data   Referral Source Social Work (self-referral)   Reason for Referral Discharge planning   Informant Patient   Patient Info   Patient's Current Mental Status at Time of Assessment Alert;Oriented   Patient lives with Spouse/Significant other   Patient Status Prior to Admission   Independent with ADLs and Mobility Yes   Discharge Needs   Anticipated D/C needs Home health care     CM spoke to patient before discharge for discharge planning follow up; patient evaluated by therapy and may benefit from home health PT services.  CM reviewed home health services and confirmed patient is currently homebound.  Patient agreeable to HHC and would like follow up post-discharge.  CM sent HH referral in [employer] system and will endorse to CM/SW for follow up over weekend.      Sonia Tripp RN Case Manager [id number]

## 2024-12-22 NOTE — OPERATIVE REPORT
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, morbid obesity      Procedure Performed:   US percutaneous access left proximal superficial femoral vein - 13 and 16 fr inari sheath   US percutaneous access right distal common femoral vein - 13 and 16 fr inari sheath  US percutaneous access right internal jugular vein - 20 Monegasque protrieve sheath   Inferior vena cava filter removal  Percutaneous thrombectomy of inferior vena cava, left and right common iliac veins, left and right external iliac vein with inari clot triever devices  IVUS IVC and iliac veins      Surgeons and Role:     * Jamaal Koroma MD - Primary     Assistant(s):       Surgical Findings:   Clearance of acute and chronic thrombus from vena cava and iliac veins with minimal chronic thrombus      Specimen: acute and chronic thrombus      Estimated Blood Loss: Blood Output: 600 mL (12/18/2024 12:54 PM)    Brief History: This is a 24-year-old female with significant history of panniculectomy a year ago and she had a pulmonary embolus.  She has been on chronic anticoagulation since.  She is now 7 weeks postpartum and her delivery was complicated by abdominal wall hematoma that required her anticoagulation be stopped and a vena cava filter was placed.  After the vena cava filter was placed she had complete thrombosis of her vena cava and had disabling venous claudication.  She was transferred to [facility] and a percutaneous thrombectomy attempt was performed.  Report describes trickle flow through the vena cava.  She presented to my office a few days after discharge and had ongoing swelling and disabling venous claudication.  Repeat CT venogram demonstrated complete occlusion of the vena cava and we are proceeding with complex percutaneous  thrombectomy as described below.    Details of procedure: Patient was taken to the hybrid operating room and placed under general anesthesia.  Patient was prepped and draped in the usual sterile fashion timeout performed.  As my case plan required her to remain supine and due to her habitus this was technically challenging.  Supine position was required to maintain that the jugular access safely.  Due to the size of her legs and the depth of her vein in her tissues I was not able to safely visualize the superficial femoral vein and distinguish it from the superficial femoral artery to safely percutaneously accessed.  I was able to on the left to successfully percutaneous access the proximal superficial femoral vein and placed a micropuncture kit.  On the right I had to percutaneously access the distal common femoral vein.  Venograms through the micropuncture sheath demonstrated significant thrombus and webs through both iliac veins.  Using an 035 Glidewire advantage I was able to traverse the iliac veins and enter the vena cava and successfully cross the vena cava into the filter to the patent vena cava above.  From each side I exchanged the micropuncture sheath for 8 St Helenian sheaths.  I then advanced a Watt cross catheters across the occluded veins and vena cava is and then did venograms through each left and right Watt cross to confirm I was intraluminal within the vena cava and I was.  Vena cavogram demonstrated a patent vena cava below the filter.    I then turned my attention to the right neck and using a micropuncture kit percutaneous access the right jugular vein.  I then advanced the Glidewire advantage into the inferior vena cava to the level of filter and then placed a 8 St Helenian sheath.  Using a Watt cross catheter I then exchanged the Glidewire advantage and the jugular for a short Amplatz wire.  Serial dilating the access site at the neck and then placed an 20 Maltese Inari Protrieve catheter and deployed  the temporary filter of the Protrieve catheter 3 cm below the right atrium.  Patient was systemically heparinized.    I proceeded with removing the filter to provide adequate thrombectomy as the filter was completely occluded.  Using a filter retrieval kit I advanced it through the 20 Guyanese jugular sheath to the level of the filter.  I then used the snare and snare the hook on the filter and remove the filter.  From each groin I then advanced each Glidewire advantage wire into the final and into the 20 Guyanese sheath and externalized both wires into the tip of the sheath.  I then exchanged the left and right lower extremity 8 Guyanese sheaths for 13 Guyanese Inari clot Treiver sheaths.  A clot Treiver Bold was opened and prepped and we began percutaneous thrombectomy starting on the right.  Using multiple passes we performed percutaneous thrombectomy with the Inari Bold Catheter starting on the right of the vena cava right common iliac vein right external iliac vein proximal common femoral vein.  Thrombectomy was performed from above location of the filter to the distal common femoral artery.  A total of 6 passes were performed on the right.  Catheter was rotated 90 degrees with each pass.  In similar fashion I then performed percutaneous thrombectomy on the left of the vena cava and left common and external iliac and common femoral vein.  Similarly a total of 6 passes were performed.  Repeat venogram demonstrated significant improvement but there was still thrombus in the vena cava and at the confluence.    Based on the CT venogram, a clot Treiver XL catheter was opened to completely remove the thrombus from the vena cava and confluence and right iliac common and external iliac veins.  Without wire bias from the contralateral wire now that I had created an adequate tract I withdrew the left wire and starting on the right repeated the thrombectomy with the clot Treiver XL catheter.  A total of 5 passes were performed  rotating the catheter 90 degrees each time.  Last 2 passes had minimal thrombus.  I then withdrew the right wire and then readvanced the left wire into the 20 Salvadorean sheath and then from the left repeated the percutaneous thrombectomy in similar fashion of the vena cava and the left common and external iliac veins.  A total of 4 passes were performed and the last 2 passes had minimal thrombus.  Repeat venogram through both sheaths demonstrated a widely patent external iliac veins with some mild stenosis and widely patent vena cava.    Intravascular ultrasound 035 catheter was used to evaluate both iliac veins and the vena cava.  There was minimal thrombus adherent to the walls that was not mobile and likely chronic.  The vena cava was patent and there was no thrombus in the protrieve filter.     Satisfied with this we removed all the sheaths and secured the access sites with an Ethibond suture.  Patient was awakened from anesthesia and taken to ICU in stable condition.     Jamaal Koroma MD  12/18/2024  1:14 PM

## 2024-12-23 NOTE — PAYOR COMM NOTE
--------------  DISCHARGE REVIEW----REQUESTING ADDITIONAL DAY 12/19 WITH DISCHARGE ON 12/20    Payor: MARIANELA GIBRAN  Subscriber #:  [id number]  Authorization Number: [id number]    Admit date: 12/18/24  Admit time:   6:48 AM  Discharge Date: 12/20/2024  3:46 PM     Admitting Physician: Jamaal Koroma MD  Attending Physician:  No att. providers found  Primary Care Physician: Deven Sharif DO    12/19          Date of Service: 12/19/2024  4:51 PM     Signed         .[facility] note     PCP: Deven Sharif DO     Chief Complaint:  F/u thrombectomy     SUBJECTIVE:  Pt reports discomfort in legs, bridgette on R that is alleviated with dilaudid pca  Passing gas, moving bowels, eating.  No sob     OBJECTIVE:  Temp:  [97.1 °F (36.2 °C)-97.8 °F (36.6 °C)] 97.1 °F (36.2 °C)  Pulse:  [] 74  Resp:  [5-23] 12  BP: ()/(58-79) 107/58  SpO2:  [91 %-100 %] 100 %  AO: (104-134)/(62-75) 119/70     Intake/Output:     Intake/Output Summary (Last 24 hours) at 12/19/2024 1652  Last data filed at 12/19/2024 1200      Gross per 24 hour   Intake 1165.6 ml   Output 2970 ml   Net -1804.4 ml              Last 3 Weights   12/19/24 0400 274 lb 14.6 oz (124.7 kg)   12/18/24 0721 269 lb 14.4 oz (122.4 kg)   12/13/24 1139 285 lb (129.3 kg)   11/20/24 0600 283 lb 15.2 oz (128.8 kg)   11/19/24 1602 278 lb 10.6 oz (126.4 kg)   11/19/24 1150 272 lb (123.4 kg)   11/19/24 1855 278 lb 10.6 oz (126.4 kg)         Exam  Gen: No acute distress  HEENT: anicteric sclera, MMM  Pulm: Lungs clear, normal respiratory effort  CV: Heart with regular rate and rhythm, no peripheral edema  Abd: Abdomen soft, nontender, nondistended, no organomegaly, bowel sounds present  MSK: Full range of motion in extremities, no clubbing, no cyanosis  Skin: no rashes or lesions  Neuro: A&OX3, no focal deficits     Data Review:          Labs:            Recent Labs   Lab 12/18/24  1347 12/19/24  0414 12/19/24  1237   WBC 5.9  5.9 5.6 6.7   HGB 9.9*  9.9* 8.2* 9.6*    MCV 96.3  96.3 96.5 95.5   .0  241.0 211.0 204.0   NE 4.91 3.72 4.64   LYMABS 0.75* 1.33 1.28   INR 1.29*  --   --               Recent Labs   Lab 12/18/24  1347 12/19/24  0414     140 142   K 3.6  3.6 3.8     107 108   CO2 27.0  27.0 29.0   BUN 5*  5* 6*   CREATSERUM 0.57  0.57 0.44*   CA 8.1*  8.1* 7.9*   *  152* 111*              Recent Labs   Lab 12/18/24  1347 12/19/24  0414   ALT <7* 16   AST 18 10   ALB 3.0* 2.6*           Recent Labs   Lab 12/19/24  1237   RAFITA 529*         No results for input(s): \"PGLU\" in the last 168 hours.     Meds:   Scheduled Medication:   sodium chloride   Intravenous Once    gabapentin  100 mg Oral TID    clopidogrel  75 mg Oral Daily    amoxicillin potassium and clavulanate  875 mg Oral Q12H      Continuous Infusing Medication:   continuous dose heparin Stopped (12/19/24 0700)    sodium chloride 10 mL/hr at 12/18/24 1354    HYDROmorphone in sodium chloride 0.9%         Microbiology:     No results found for this visit on 12/18/24.           Lab Results   Component Value Date     COVID19 Not Detected 11/19/2024     COVID19 Not Detected 01/04/2023     COVID19 NOT DETECTED 05/28/2021          Assessment/Plan:      25 yo woman h/o vte who had delivery of a baby in 10/24 and complicated by additional dvt and then later development of pelvic hematoma and ivc filter placement. Then complicated by extensive b/l dvt above IVC filter requiring thrombectomy at OSH and now s/p repeat thrombectomy with ivc filter retrieval 12/18     Hypercoagulable state  Pelvic hematoma  S/p thrombectomies X 2, last was 12/18  S/p ivc filter now removed  - hematology following, had APLS+ result 11/20 and given complicated vte history, they would favor further anticoag, possibly coumadin. To discuss further with vascular surgery  - for now on heparin gtt and plavix  - okay for bed to chair and PT.   - CT venogram 12/19 demonstrating no residual thrombus in ivc or iliac  veins. There is still mild residual thrombus in common femoral/prox femoral veins     LE pain- presume 2/2 clot history but also has significant spinal stenosis  - plan for restarting gabapentin, she states that this worked effectively in past  - on dilaudid pca as well. Bowel regimen     Rectal sheath hematoma  Persistent mild hydroureteronephrosis of R collecting system 2/2 distal ureteral compression from hematoma  - will consider discussion with urology           Meseret Valentino MD  [facility]                        12/20 DISCHARGED          Discharge Summary Notes        Discharge Summary signed by Meseret Valentino MD at 12/20/2024  5:04 PM       Author: Meseret Valentino MD Specialty: Internal Medicine Author Type: Physician    Filed: 12/20/2024  5:04 PM Date of Service: 12/20/2024  4:56 PM Status: Addendum    : Meseret Valentino MD (Physician)    Related Notes: Original Note by Meseret Valentino MD (Physician) filed at 12/20/2024  5:04 PM         .[facility] Discharge Summary    Patient ID:  Carmela Bell  [id number]  24 year old  7/30/2000    Admit date: 12/18/2024  Discharge date and time: 12/20/2024  Attending Physician: Meseret Valentino MD  Primary Care Physician: Deven Sharif DO     Admit Dx: inferior vena cava thrombosis, acute deep vein thrombosis of both iliofemoral veins, hematoma of         sheath, intitial encounter  Inferior vena caval thrombosis (HCC)    Primary Diagnosis at discharge from Hospital: Other: 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    Discharged Condition: good    Disposition: home    Important follow up:  Jamaal Koroma MD  [facility] [facility]  [address] [place] [id number] [phone]    Follow up  in 1 week(s)      Deven Sharif DO  [address]  [facility] [address] [phone]    Follow up in 1 week(s)          Reason for admission and hospital course:   24-year-old woman with history of venous thromboembolism who had delivery of baby in October complicated by additional DVT and then later development of pelvic hematoma and IVC filter placement.  Then subsequently complicated by extensive bilateral DVTs above IVC filter requiring thrombectomy at outside hospital and now status post repeat thrombectomy with IVC filter retrieval on December 18 by vascular surgery.  Patient was monitored in ICU following the operation.  CT venogram on December 19 demonstrated no residual thrombus in IVC or iliac veins.  There is still mild residual thrombus in common femoral and proximal femoral veins.  Hematology was consulted.  Patient had positive antiphospholipid antibody result and given complicated venous thromboembolism history was recommended to continue with anticoagulation, after discussion it was felt that Lovenox injections would be best option.  It was noted that patient's weight was 120 kg and therefore Lovenox dose was adjusted to 120 mg twice daily for discharge.    Patient also noted to have significant lower extremity pain that required Dilaudid PCA postop.  As swelling improved after the procedure, so did some of the discomfort.  The PCA was stopped and she did not seem to require significant oral pain medicine.  She was restarted on gabapentin, she had taken in the past as well, as she is noted to have significant spinal stenosis and this was believed to be contributing to some of her lower extremity pain.    Of note on imaging there was some mild hydroureteronephrosis of the right collecting system secondary to distal ureteral compression from pelvic hematoma.  This seems to be the result of the previous known rectus sheath hematoma that she had incurred.  I did discuss this with patient and  once some of the clotting and hematoma issues have be gone to resolve, it would probably be an ideal time to reassess the ureter on that side with repeat imaging.  Otherwise her kidney function was normal and she was urinating without difficulty.    Patient was given Augmentin for possible otitis media in the ICU, this was continued for a course after discharge.    Day of discharge Exam  Vitals:    12/20/24 1300   BP:    Pulse: 97   Resp: 24   Temp:      Exam on day of discharge:  Gen: No acute distress, alert and oriented  CV: RRR, +s1/s2  Lungs: CTAB, good respiratory effort  Abdomen: s/nt/nd  Ext: Moves all 4 extremities, no c/c/e  Neuro: CN Intact, no focal deficits    Discharge meds     Medication List        START taking these medications      amoxicillin clavulanate 875-125 MG Tabs  Commonly known as: Augmentin  Take 1 tablet by mouth 2 (two) times daily for 5 days.     clopidogrel 75 MG Tabs  Commonly known as: Plavix  Take 1 tablet (75 mg total) by mouth daily.  Start taking on: December 21, 2024     enoxaparin 120 MG/0.8ML Sosy  Commonly known as: Lovenox  Inject 0.82 mL (123 mg total) into the skin 2 (two) times daily for 30 doses.  Replaces: Lovenox 100 MG/ML Sosy     gabapentin 100 MG Caps  Commonly known as: Neurontin  Take 1 capsule (100 mg total) by mouth 3 (three) times daily.            CHANGE how you take these medications      Sennosides 17.2 MG Tabs  What changed: Another medication with the same name was removed. Continue taking this medication, and follow the directions you see here.            CONTINUE taking these medications      cyanocobalamin 1000 MCG Tabs  Take 1 tablet (1,000 mcg total) by mouth daily.     folic acid 1 MG Tabs  Commonly known as: Folvite  Take 1 tablet (1 mg total) by mouth daily.     lidocaine-menthol 4-1 % Ptch     oxyCODONE HCl 5 MG Caps  Commonly known as: OXY-IR     Polyethylene Glycol 3350 17 g Pack  Commonly known as: MIRALAX            STOP taking these  medications      continuous dose heparin 100 Units/mL Soln  Commonly known as: Porcine     Lovenox 100 MG/ML Sosy  Generic drug: enoxaparin  Replaced by: enoxaparin 120 MG/0.8ML Sosy     vancomycin in sodium chloride 0.9% 1.75 g/500mL Soln  Commonly known as: Vancocin               Where to Get Your Medications        These medications were sent to [id number] [place] [facility] [address] AT [facility], [phone], [phone] [address], [place] [address]      Phone: [phone]   amoxicillin clavulanate 875-125 MG Tabs  clopidogrel 75 MG Tabs  enoxaparin 120 MG/0.8ML Sosy  gabapentin 100 MG Caps       Consults: IP CONSULT TO HOSPITALIST  IP CONSULT TO CRITICAL CARE INTENSIVIST  IP CONSULT TO HEMATOLOGY  IP CONSULT TO SOCIAL WORK  Radiology: CT VENOGRAPHY ABDOMEN PELVIS (W/ CONTRAST) (CPT=74177)    Result Date: 12/19/2024  PROCEDURE:  CT VENOGRAPHY ABDOMEN PELVIS (W/ CONTRAST) (CPT=74177)  COMPARISON:  [facility] , CT, CT ABDOMEN+PELVIS(CONTRAST ONLY)(CPT=74177), 11/18/2024, 4:49 PM.  INDICATIONS:  post thrombectomy  TECHNIQUE:  Axial helical acquisitions are obtained from through the abdomen and pelvis after bolus intravenous nonionic contrast administration.  Images of the right lower quadrant reconstructed at 2.5 mm. Coronal MPR imaging was obtained.  Dose reduction techniques were used. Dose information is transmitted to the ACR (American College of Radiology) NRDR (National Radiology Data Registry) which includes the Dose Index Registry.  PATIENT STATED HISTORY:(As transcribed by Technologist)  post thrombectomy   CONTRAST USED:  150cc of Isovue 370  FINDINGS:  The diagnostic quality of examination is degraded by suboptimal bolus timing.  Technologist reports technical difficulty with IV line resulting in delayed bolus timing.   LIVER:  No enlargement, atrophy, abnormal density, or significant focal lesion.  BILIARY:  Cholecystectomy.  Normal bile ducts. PANCREAS:  No  lesion, fluid collection, ductal dilatation, or atrophy.  SPLEEN:  No enlargement or focal lesion.  KIDNEYS:  Normal renal morphology and enhancement.  Mild hydroureteronephrosis of the right collecting system secondary to distal ureteral compression from evolving hematoma of the right hemipelvis, described below.  The extent of collecting system dilation is similar compared to 11/18/2024. ADRENALS:  No mass or enlargement.  AORTA/VASCULAR:  No abdominal aortic aneurysm.  Suboptimal opacification of the IVC secondary to bolus timing.  Interval removal of IVC filter without residual intravascular thrombus of the IVC or iliac veins noted.  However, there is mild fusiform expansion and central hypodensity of the distal common femoral and proximal femoral veins bilaterally such that there is likely residual intravascular thrombus (series 6, image 34, 31) RETROPERITONEUM:  No mass or adenopathy.  BOWEL/MESENTERY:  Partial gastrectomy.  No evidence of bowel inflammation or obstruction. ABDOMINAL WALL:  Diffuse body wall edema with small subcutaneous hematomas of the right and left groin, each measuring approximately 5 x 3 cm (series 3, image 137). URINARY BLADDER:  The bladder is displaced leftward secondary to evolving encapsulated hematoma measuring approximately 11 x 9 x 11 cm, previously 14 x 11 x 12 cm. PELVIC NODES:  No adenopathy.  PELVIC ORGANS:  No visible mass.  Pelvic organs appropriate for patient age.  BONES:  No bony lesion or fracture.  LUNG BASES:  Interstitial pulmonary edema with heterogeneous consolidation of the left lower lobe, possibly atelectatic or inflammatory.  Small right pleural effusion. OTHER:  Negative.             CONCLUSION:   1. The diagnostic quality of the examination is degraded by suboptimal bolus timing related to technical difficulties with IV line.  2. Within this limitation, there is no demonstrable residual thrombus of the IVC or iliac veins.  However, there is persistent  expansion and central hypodensity of the common femoral and proximal femoral veins bilaterally consistent with a mild volume of  residual intravascular thrombus.  This is decreased in volume compared to 2024.  3. Persistent mild hydroureteronephrosis of the right collecting system secondary to distal ureteral compression from evolving hematoma of the right hemipelvis.  The hematoma is decreased in size compared to 2024, dimensions as above.   4. Small subcutaneous hematomas of the right and left groin, each measuring 5 x 3 cm.  5. Interstitial pulmonary edema with heterogeneous consolidation of the left lower lobe which may be atelectatic or inflammatory.  Correlate with upper respiratory symptoms.    LOCATION:  [place] [id number]   Dictated by (CST): Tika Zelaya MD on 2024 at 12:21 PM     Finalized by (CST): Tika Zelaya MD on 2024 at 12:36 PM       CARD ECHO 2D DOPPLER (CPT=93306)    Result Date: 2024  Transthoracic Echocardiogram Name:Carmela Bell Date: 2024 :  2000 Ht:  (69in)  BP: 96 / 66 MRN:  [id number]    Age:  24years    Wt:  (274lb) HR: 64bpm Loc:  [facility]       Gndr: F          BSA: 2.36m^2 Sonographer: Lulu VERNON Ordering:    Elvia Clay Consulting:  Leandro Weber ---------------------------------------------------------------------------- History/Indications:  Blood clots ---------------------------------------------------------------------------- Procedure information:  A transthoracic complete 2D study was performed. Additional evaluation included M-mode, complete spectral Doppler, and color Doppler.  Patient status:  Inpatient.  Location:  Bedside.    Comparison was made to the study of 2024.    This was a routine study. Transthoracic echocardiography for ventricular function evaluation and assessment of valvular function. Image quality was adequate. ECG rhythm:   Normal sinus  ---------------------------------------------------------------------------- Conclusions: 1. Left ventricle: The cavity size was normal. Wall thickness was normal.    Systolic function was normal. The estimated ejection fraction was 60-65%,    by visual assessment. No diagnostic evidence for regional wall motion    abnormalities. Left ventricular diastolic function parameters were    normal. 2. Left atrium: The left atrial volume was normal. 3. Pulmonary arteries: Systolic pressure was at the upper limits of normal,    in the range of 30mm Hg to 35mm Hg. Estimated pulmonary artery diastolic    pressure was 7mm Hg. Impressions:  This study is compared with previous dated 11/20/2024: * ---------------------------------------------------------------------------- * Findings: Left ventricle:  The cavity size was normal. Wall thickness was normal. Systolic function was normal. The estimated ejection fraction was 60-65%, by visual assessment. No diagnostic evidence for regional wall motion abnormalities. Left ventricular diastolic function parameters were normal. Left atrium:  The left atrial volume was normal. Right ventricle:  The cavity size was normal. Systolic function was normal. Right atrium:  The atrium was normal in size. Mitral valve:  The valve was structurally normal. Leaflet separation was normal.  Doppler:  Transvalvular velocity was within the normal range. There was no evidence for stenosis. There was trivial regurgitation. Aortic valve:  The valve was structurally normal. The valve was trileaflet. Cusp separation was normal.  Doppler:  Transvalvular velocity was within the normal range. There was no evidence for stenosis. There was no significant regurgitation. Tricuspid valve:  The valve is structurally normal. Leaflet separation was normal.  Doppler:  Transvalvular velocity was within the normal range. There was no evidence for stenosis. There was mild regurgitation. Pulmonic valve:    There was no  significant valve disease.    Doppler: Transvalvular velocity was within the normal range. There was no evidence for stenosis. There was trivial regurgitation. Pericardium:   There was no pericardial effusion. Aorta: Aortic root: The aortic root was normal-sized. Ascending aorta: The ascending aorta was normal. Pulmonary arteries: Systolic pressure was at the upper limits of normal, in the range of 30mm Hg to 35mm Hg. Estimated pulmonary artery diastolic pressure was 7mm Hg. Systemic veins: Inferior vena cava: The IVC was normally collapsible and normal-sized. ---------------------------------------------------------------------------- Measurements  Left ventricle         Value        Ref       11/20/2024  IVS thickness, ED,     0.9   cm     0.6 - 0.9 1.0  PLAX  LV ID, ED, PLAX        4.0   cm     3.8 - 5.2 4.4  LV ID, ES, PLAX        2.7   cm     2.2 - 3.5 3.0  LV PW thickness,   (H) 1.0   cm     0.6 - 0.9 0.9  ED, PLAX  LV ejection            61    %      54 - 74   61  fraction  LV e', lateral         18.2  cm/sec >=10.0    11.6  LV E/e', lateral       5            <=13      6  LV e', medial          11.9  cm/sec >=7.0     9.5  LV E/e', medial        8            --------- 8  LV e', average         15.1  cm/sec --------- 10.5  LV E/e', average       6            <=14      7  Ascending aorta        Value        Ref       11/20/2024  Ascending aorta        3.1   cm     1.9 - 3.5 3.3  ID, A-P, ED  Left atrium            Value        Ref       11/20/2024  LA volume, S           50    ml     22 - 52   39  LA volume/bsa, S       21    ml/m^2 16 - 34   16  LA volume, ES, 1-p     52    ml     22 - 52   42  A4C  LA volume, ES, 1-p     48    ml     22 - 52   31  A2C  LA volume, ES, A/L     52    ml     --------- 41  LA volume/bsa, ES,     22    ml/m^2 16 - 34   17  A/L  Mitral valve           Value        Ref       11/20/2024  Mitral E-wave peak     0.92  m/sec  --------- 0.71  velocity  Mitral A-wave peak     0.41  m/sec   --------- 0.54  velocity  Mitral peak            3     mm Hg  --------- ----------  gradient, D  Mitral E/A ratio,      2.2          --------- 1.3  peak  Pulmonary artery       Value        Ref       11/20/2024  PA pressure, S, DP     32    mm Hg  --------- ----------  PA pressure, ED,       7     mm Hg  --------- ----------  DP  Tricuspid valve        Value        Ref       11/20/2024  Tricuspid regurg       2.71  m/sec  <=2.8     1.6  peak velocity  Tricuspid peak         29    mm Hg  --------- 10  RV-RA gradient  Systemic veins         Value        Ref       11/20/2024  Estimated CVP          3     mm Hg  --------- 3  Right ventricle        Value        Ref       11/20/2024  RV pressure, S, DP     32    mm Hg  --------- 13  Pulmonic valve         Value        Ref       11/20/2024  Pulmonic regurg        1     m/sec  --------- ----------  velocity, ED  Pulmonic regurg        4     mm Hg  --------- ----------  gradient, ED Legend: (L)  and  (H)  robin values outside specified reference range. ---------------------------------------------------------------------------- Prepared and electronically signed by Demetrio Llanes MD 12/19/2024 11:40     Operative Procedures: Procedure(s) (LRB):  PERCUTANEOUS THROMBECTOMY OF INFERIOR VENA CAVA, BILATERAL ILIOFEMORAL VEINS, BILATERAL POPLITEAL VEINS WITH RIGHT INTERNAL JUGULAR ACCESS, BILATERAL SUPERFICIAL FEMORAL VEIN ACCESS, POST TIBIAL VEIN ACCESS, CELL SAVER (Bilateral)  ANGIOGRAM ADD-ON (Bilateral)  Activity: activity as tolerated  Diet: regular diet  Wound Care: none needed  Code Status: Full Code  O2: none  Total Time Coordinating Care: 35 minutes Patient had opportunity to ask questions and state understand and agree with therapeutic plan as outlined    I reconciled current and discharge medications on day of discharge.        Electronically signed by Meseret Valentino MD on 12/20/2024  5:04 PM         REVIEWER COMMENTS

## 2024-12-27 ENCOUNTER — EKG ENCOUNTER (OUTPATIENT)
Dept: LAB | Age: 24
End: 2024-12-27
Attending: SURGERY
Payer: COMMERCIAL

## 2024-12-27 ENCOUNTER — LAB ENCOUNTER (OUTPATIENT)
Dept: LAB | Age: 24
End: 2024-12-27
Attending: SURGERY
Payer: COMMERCIAL

## 2024-12-27 DIAGNOSIS — Z01.818 PRE-OP TESTING: ICD-10-CM

## 2024-12-27 DIAGNOSIS — I82.220 INFERIOR VENA CAVAL THROMBOSIS (HCC): ICD-10-CM

## 2024-12-27 DIAGNOSIS — I82.423 ACUTE DEEP VEIN THROMBOSIS (DVT) OF BOTH ILIOFEMORAL VEINS (HCC): ICD-10-CM

## 2024-12-27 LAB
ALBUMIN SERPL-MCNC: 3.3 G/DL (ref 3.2–4.8)
ALBUMIN/GLOB SERPL: 1.1 {RATIO} (ref 1–2)
ALP LIVER SERPL-CCNC: 82 U/L
ALT SERPL-CCNC: <7 U/L
ANION GAP SERPL CALC-SCNC: 3 MMOL/L (ref 0–18)
ANTIBODY SCREEN: NEGATIVE
APTT PPP: 32.1 SECONDS (ref 23–36)
AST SERPL-CCNC: 20 U/L (ref ?–34)
B-HCG SERPL-ACNC: <2.6 MIU/ML
BASOPHILS # BLD AUTO: 0.02 X10(3) UL (ref 0–0.2)
BASOPHILS NFR BLD AUTO: 0.4 %
BILIRUB SERPL-MCNC: 0.4 MG/DL (ref 0.3–1.2)
BUN BLD-MCNC: <5 MG/DL (ref 9–23)
CALCIUM BLD-MCNC: 8.9 MG/DL (ref 8.7–10.4)
CHLORIDE SERPL-SCNC: 104 MMOL/L (ref 98–112)
CO2 SERPL-SCNC: 30 MMOL/L (ref 21–32)
CREAT BLD-MCNC: 0.44 MG/DL
EGFRCR SERPLBLD CKD-EPI 2021: 137 ML/MIN/1.73M2 (ref 60–?)
EOSINOPHIL # BLD AUTO: 0.15 X10(3) UL (ref 0–0.7)
EOSINOPHIL NFR BLD AUTO: 2.8 %
ERYTHROCYTE [DISTWIDTH] IN BLOOD BY AUTOMATED COUNT: 15.3 %
FASTING STATUS PATIENT QL REPORTED: YES
GLOBULIN PLAS-MCNC: 3.1 G/DL (ref 2–3.5)
GLUCOSE BLD-MCNC: 97 MG/DL (ref 70–99)
HCT VFR BLD AUTO: 29.4 %
HGB BLD-MCNC: 9.2 G/DL
IMM GRANULOCYTES # BLD AUTO: 0.03 X10(3) UL (ref 0–1)
IMM GRANULOCYTES NFR BLD: 0.6 %
INR BLD: 0.97 (ref 0.8–1.2)
LYMPHOCYTES # BLD AUTO: 1.47 X10(3) UL (ref 1–4)
LYMPHOCYTES NFR BLD AUTO: 27.7 %
MCH RBC QN AUTO: 30.8 PG (ref 26–34)
MCHC RBC AUTO-ENTMCNC: 31.3 G/DL (ref 31–37)
MCV RBC AUTO: 98.3 FL
MONOCYTES # BLD AUTO: 0.69 X10(3) UL (ref 0.1–1)
MONOCYTES NFR BLD AUTO: 13 %
NEUTROPHILS # BLD AUTO: 2.95 X10 (3) UL (ref 1.5–7.7)
NEUTROPHILS # BLD AUTO: 2.95 X10(3) UL (ref 1.5–7.7)
NEUTROPHILS NFR BLD AUTO: 55.5 %
PLATELET # BLD AUTO: 244 10(3)UL (ref 150–450)
POTASSIUM SERPL-SCNC: 3.6 MMOL/L (ref 3.5–5.1)
PROT SERPL-MCNC: 6.4 G/DL (ref 5.7–8.2)
PROTHROMBIN TIME: 12.7 SECONDS (ref 11.6–14.8)
RBC # BLD AUTO: 2.99 X10(6)UL
RH BLOOD TYPE: POSITIVE
SODIUM SERPL-SCNC: 137 MMOL/L (ref 136–145)
WBC # BLD AUTO: 5.3 X10(3) UL (ref 4–11)

## 2024-12-27 PROCEDURE — 85025 COMPLETE CBC W/AUTO DIFF WBC: CPT

## 2024-12-27 PROCEDURE — 84702 CHORIONIC GONADOTROPIN TEST: CPT

## 2024-12-27 PROCEDURE — 86900 BLOOD TYPING SEROLOGIC ABO: CPT

## 2024-12-27 PROCEDURE — 80053 COMPREHEN METABOLIC PANEL: CPT

## 2024-12-27 PROCEDURE — 86850 RBC ANTIBODY SCREEN: CPT

## 2024-12-27 PROCEDURE — 86920 COMPATIBILITY TEST SPIN: CPT

## 2024-12-27 PROCEDURE — 85610 PROTHROMBIN TIME: CPT

## 2024-12-27 PROCEDURE — 86901 BLOOD TYPING SEROLOGIC RH(D): CPT

## 2024-12-27 PROCEDURE — 93010 ELECTROCARDIOGRAM REPORT: CPT | Performed by: INTERNAL MEDICINE

## 2024-12-27 PROCEDURE — 85730 THROMBOPLASTIN TIME PARTIAL: CPT

## 2024-12-27 PROCEDURE — 36415 COLL VENOUS BLD VENIPUNCTURE: CPT

## 2024-12-27 PROCEDURE — 93005 ELECTROCARDIOGRAM TRACING: CPT

## 2024-12-27 RX ORDER — HYDROCODONE BITARTRATE AND ACETAMINOPHEN 10; 325 MG/1; MG/1
1 TABLET ORAL EVERY 6 HOURS PRN
COMMUNITY

## 2024-12-27 NOTE — PAT NURSING NOTE
Pre-Surgical Instructions for 12/30/24 with Dr. Koroma       Visitor Instructions    -2 visitors are welcome to accompany you the day of surgery.   -Visiting hours are 7:00 am-8:00 pm.      Pre-Op Instructions    Scheduled Surgery: You are scheduled for Vascular Surgery      Date of Procedure: Monday, 12/30/24      Time of arrival: 11:00 am     -This is going to be a tentative time of arrival for surgery.   -We will call you the buisness day prior to your surgery in the late afternoon to reconfirm your arrival.   -Please check your voicemail for a message.    -Park in the [employer] garage at [facility].    -Check in at the [facility] reception desk in the [facility].  -Our  will be there to check you in for your procedure.   -Complimentary [employer] parking is available starting at 6:00 am.      Diet Instructions:    -Do not eat or drink after midnight. This includes water, gum, candy and food.      Medication Instructions:     CONTINUE to take all of your prescribed medications as directed EXCEPT:    -The morning of surgery ONLY take Plavix with a sip of water.       Medications to Stop:     -The last dose of your Lovenox injection will be Sunday, 12/29 evening dose.     -Stop all vitamins, supplements, and NSAID's (ex. Ibuprofen, Excederin, Aleve, Diclofenac, and any gels having steroids) starting today, 12/27.         Skin Prep:     -Shower with Dial Soap the morning of your surgery (or any antibacterial soap).      Admit/Discharge Status:     -You will be admitted to the hospital after surgery.      Discharge Teaching:     -Your nurse will give you specific instructions before discharge.    -Any questions, please call the surgeon's office.      Additional Instructions:     -Keep personal possessions to a minimum: bring insurance card(s) and picture ID, toiletries, wear comfortable clothing, bring glasses/eye glass case.    -Leave all valuables at home, including jewelry.    If you  are scheduled to arrive early for 5:30 am, the [facility] reception desk does not open till 5:30 am. It may be dark, but you are in the correct location.     We are open M-F from 8am- 5pm. We are closed on holidays and weekends. We can be reached at [phone].         Thank you

## 2024-12-29 ENCOUNTER — ANESTHESIA EVENT (OUTPATIENT)
Dept: CARDIAC SURGERY | Facility: HOSPITAL | Age: 24
End: 2024-12-29
Payer: COMMERCIAL

## 2024-12-30 ENCOUNTER — HOSPITAL ENCOUNTER (INPATIENT)
Facility: HOSPITAL | Age: 24
LOS: 2 days | Discharge: HOME | DRG: 272 | End: 2025-01-01
Attending: SURGERY | Admitting: SURGERY
Payer: COMMERCIAL

## 2024-12-30 ENCOUNTER — ANESTHESIA (OUTPATIENT)
Dept: CARDIAC SURGERY | Facility: HOSPITAL | Age: 24
End: 2024-12-30
Payer: COMMERCIAL

## 2024-12-30 DIAGNOSIS — I82.220 INFERIOR VENA CAVAL THROMBOSIS (HCC): ICD-10-CM

## 2024-12-30 DIAGNOSIS — I82.423 ACUTE DEEP VEIN THROMBOSIS (DVT) OF BOTH ILIOFEMORAL VEINS (HCC): Primary | ICD-10-CM

## 2024-12-30 DIAGNOSIS — Z01.818 PRE-OP TESTING: ICD-10-CM

## 2024-12-30 DIAGNOSIS — G89.18 POSTOPERATIVE PAIN: ICD-10-CM

## 2024-12-30 LAB
ANION GAP SERPL CALC-SCNC: 6 MMOL/L (ref 0–18)
APTT PPP: 82.3 SECONDS (ref 23–36)
APTT PPP: >240 SECONDS (ref 23–36)
ATRIAL RATE: 75 BPM
B-HCG UR QL: NEGATIVE
BUN BLD-MCNC: <5 MG/DL (ref 9–23)
CALCIUM BLD-MCNC: 8.4 MG/DL (ref 8.7–10.4)
CHLORIDE SERPL-SCNC: 107 MMOL/L (ref 98–112)
CO2 SERPL-SCNC: 26 MMOL/L (ref 21–32)
CREAT BLD-MCNC: 0.45 MG/DL
EGFRCR SERPLBLD CKD-EPI 2021: 136 ML/MIN/1.73M2 (ref 60–?)
ERYTHROCYTE [DISTWIDTH] IN BLOOD BY AUTOMATED COUNT: 15.3 %
GLUCOSE BLD-MCNC: 116 MG/DL (ref 70–99)
HCT VFR BLD AUTO: 27.4 %
HGB BLD-MCNC: 9.1 G/DL
INR BLD: 1.25 (ref 0.8–1.2)
MCH RBC QN AUTO: 31.7 PG (ref 26–34)
MCHC RBC AUTO-ENTMCNC: 33.2 G/DL (ref 31–37)
MCV RBC AUTO: 95.5 FL
P AXIS: 29 DEGREES
P-R INTERVAL: 174 MS
PLATELET # BLD AUTO: 251 10(3)UL (ref 150–450)
POTASSIUM SERPL-SCNC: 3.7 MMOL/L (ref 3.5–5.1)
PROTHROMBIN TIME: 15.8 SECONDS (ref 11.6–14.8)
Q-T INTERVAL: 366 MS
QRS DURATION: 84 MS
QTC CALCULATION (BEZET): 408 MS
R AXIS: 26 DEGREES
RBC # BLD AUTO: 2.87 X10(6)UL
SODIUM SERPL-SCNC: 139 MMOL/L (ref 136–145)
T AXIS: 8 DEGREES
VENTRICULAR RATE: 75 BPM
WBC # BLD AUTO: 5.7 X10(3) UL (ref 4–11)

## 2024-12-30 PROCEDURE — 04CC3ZZ EXTIRPATION OF MATTER FROM RIGHT COMMON ILIAC ARTERY, PERCUTANEOUS APPROACH: ICD-10-PCS | Performed by: SURGERY

## 2024-12-30 PROCEDURE — 06CG3ZZ EXTIRPATION OF MATTER FROM LEFT EXTERNAL ILIAC VEIN, PERCUTANEOUS APPROACH: ICD-10-PCS | Performed by: SURGERY

## 2024-12-30 PROCEDURE — 06CN3ZZ EXTIRPATION OF MATTER FROM LEFT FEMORAL VEIN, PERCUTANEOUS APPROACH: ICD-10-PCS | Performed by: SURGERY

## 2024-12-30 PROCEDURE — 06CD3ZZ EXTIRPATION OF MATTER FROM LEFT COMMON ILIAC VEIN, PERCUTANEOUS APPROACH: ICD-10-PCS | Performed by: SURGERY

## 2024-12-30 PROCEDURE — B5191ZZ FLUOROSCOPY OF INFERIOR VENA CAVA USING LOW OSMOLAR CONTRAST: ICD-10-PCS | Performed by: SURGERY

## 2024-12-30 PROCEDURE — B51D1ZA FLUOROSCOPY OF BILATERAL LOWER EXTREMITY VEINS USING LOW OSMOLAR CONTRAST, GUIDANCE: ICD-10-PCS | Performed by: SURGERY

## 2024-12-30 PROCEDURE — 067F3ZZ DILATION OF RIGHT EXTERNAL ILIAC VEIN, PERCUTANEOUS APPROACH: ICD-10-PCS | Performed by: SURGERY

## 2024-12-30 PROCEDURE — 067C3ZZ DILATION OF RIGHT COMMON ILIAC VEIN, PERCUTANEOUS APPROACH: ICD-10-PCS | Performed by: SURGERY

## 2024-12-30 PROCEDURE — 04CH3ZZ EXTIRPATION OF MATTER FROM RIGHT EXTERNAL ILIAC ARTERY, PERCUTANEOUS APPROACH: ICD-10-PCS | Performed by: SURGERY

## 2024-12-30 PROCEDURE — 04CK3ZZ EXTIRPATION OF MATTER FROM RIGHT FEMORAL ARTERY, PERCUTANEOUS APPROACH: ICD-10-PCS | Performed by: SURGERY

## 2024-12-30 PROCEDURE — 067M3ZZ DILATION OF RIGHT FEMORAL VEIN, PERCUTANEOUS APPROACH: ICD-10-PCS | Performed by: SURGERY

## 2024-12-30 RX ORDER — HYDROMORPHONE HYDROCHLORIDE 1 MG/ML
0.2 INJECTION, SOLUTION INTRAMUSCULAR; INTRAVENOUS; SUBCUTANEOUS EVERY 5 MIN PRN
Status: DISCONTINUED | OUTPATIENT
Start: 2024-12-30 | End: 2024-12-30 | Stop reason: HOSPADM

## 2024-12-30 RX ORDER — DEXAMETHASONE SODIUM PHOSPHATE 4 MG/ML
VIAL (ML) INJECTION AS NEEDED
Status: DISCONTINUED | OUTPATIENT
Start: 2024-12-30 | End: 2024-12-30 | Stop reason: SURG

## 2024-12-30 RX ORDER — HEPARIN SODIUM 1000 [USP'U]/ML
INJECTION, SOLUTION INTRAVENOUS; SUBCUTANEOUS AS NEEDED
Status: DISCONTINUED | OUTPATIENT
Start: 2024-12-30 | End: 2024-12-30 | Stop reason: SURG

## 2024-12-30 RX ORDER — FOLIC ACID 1 MG/1
1 TABLET ORAL NIGHTLY
Status: DISCONTINUED | OUTPATIENT
Start: 2024-12-30 | End: 2025-01-01

## 2024-12-30 RX ORDER — HYDROMORPHONE HYDROCHLORIDE 1 MG/ML
0.4 INJECTION, SOLUTION INTRAMUSCULAR; INTRAVENOUS; SUBCUTANEOUS EVERY 2 HOUR PRN
Status: DISCONTINUED | OUTPATIENT
Start: 2024-12-30 | End: 2025-01-01

## 2024-12-30 RX ORDER — ONDANSETRON 2 MG/ML
4 INJECTION INTRAMUSCULAR; INTRAVENOUS EVERY 6 HOURS PRN
Status: DISCONTINUED | OUTPATIENT
Start: 2024-12-30 | End: 2024-12-30 | Stop reason: HOSPADM

## 2024-12-30 RX ORDER — MIDAZOLAM HYDROCHLORIDE 1 MG/ML
INJECTION INTRAMUSCULAR; INTRAVENOUS AS NEEDED
Status: DISCONTINUED | OUTPATIENT
Start: 2024-12-30 | End: 2024-12-30 | Stop reason: SURG

## 2024-12-30 RX ORDER — LABETALOL HYDROCHLORIDE 5 MG/ML
5 INJECTION, SOLUTION INTRAVENOUS EVERY 5 MIN PRN
Status: DISCONTINUED | OUTPATIENT
Start: 2024-12-30 | End: 2024-12-30 | Stop reason: HOSPADM

## 2024-12-30 RX ORDER — WARFARIN SODIUM 5 MG/1
5 TABLET ORAL
Status: COMPLETED | OUTPATIENT
Start: 2024-12-30 | End: 2024-12-30

## 2024-12-30 RX ORDER — ACETAMINOPHEN 500 MG
1000 TABLET ORAL ONCE AS NEEDED
Status: DISCONTINUED | OUTPATIENT
Start: 2024-12-30 | End: 2024-12-30 | Stop reason: HOSPADM

## 2024-12-30 RX ORDER — HYDROMORPHONE HYDROCHLORIDE 1 MG/ML
INJECTION, SOLUTION INTRAMUSCULAR; INTRAVENOUS; SUBCUTANEOUS
Status: COMPLETED
Start: 2024-12-30 | End: 2024-12-30

## 2024-12-30 RX ORDER — HYDROMORPHONE HYDROCHLORIDE 1 MG/ML
0.8 INJECTION, SOLUTION INTRAMUSCULAR; INTRAVENOUS; SUBCUTANEOUS EVERY 2 HOUR PRN
Status: DISCONTINUED | OUTPATIENT
Start: 2024-12-30 | End: 2025-01-01

## 2024-12-30 RX ORDER — MIDAZOLAM HYDROCHLORIDE 1 MG/ML
1 INJECTION INTRAMUSCULAR; INTRAVENOUS EVERY 5 MIN PRN
Status: DISCONTINUED | OUTPATIENT
Start: 2024-12-30 | End: 2024-12-30 | Stop reason: HOSPADM

## 2024-12-30 RX ORDER — ROCURONIUM BROMIDE 10 MG/ML
INJECTION, SOLUTION INTRAVENOUS AS NEEDED
Status: DISCONTINUED | OUTPATIENT
Start: 2024-12-30 | End: 2024-12-30 | Stop reason: SURG

## 2024-12-30 RX ORDER — HEPARIN SODIUM AND DEXTROSE 10000; 5 [USP'U]/100ML; G/100ML
INJECTION INTRAVENOUS CONTINUOUS
Status: DISCONTINUED | OUTPATIENT
Start: 2024-12-30 | End: 2024-12-31

## 2024-12-30 RX ORDER — GABAPENTIN 100 MG/1
100 CAPSULE ORAL 3 TIMES DAILY
Status: DISCONTINUED | OUTPATIENT
Start: 2024-12-30 | End: 2025-01-01

## 2024-12-30 RX ORDER — MEPERIDINE HYDROCHLORIDE 25 MG/ML
12.5 INJECTION INTRAMUSCULAR; INTRAVENOUS; SUBCUTANEOUS AS NEEDED
Status: DISCONTINUED | OUTPATIENT
Start: 2024-12-30 | End: 2024-12-30 | Stop reason: HOSPADM

## 2024-12-30 RX ORDER — HYDROCODONE BITARTRATE AND ACETAMINOPHEN 10; 325 MG/1; MG/1
1 TABLET ORAL EVERY 4 HOURS PRN
Status: DISCONTINUED | OUTPATIENT
Start: 2024-12-30 | End: 2025-01-01

## 2024-12-30 RX ORDER — HYDROMORPHONE HYDROCHLORIDE 1 MG/ML
0.4 INJECTION, SOLUTION INTRAMUSCULAR; INTRAVENOUS; SUBCUTANEOUS EVERY 5 MIN PRN
Status: DISCONTINUED | OUTPATIENT
Start: 2024-12-30 | End: 2024-12-30 | Stop reason: HOSPADM

## 2024-12-30 RX ORDER — HYDROMORPHONE HYDROCHLORIDE 1 MG/ML
0.6 INJECTION, SOLUTION INTRAMUSCULAR; INTRAVENOUS; SUBCUTANEOUS EVERY 5 MIN PRN
Status: DISCONTINUED | OUTPATIENT
Start: 2024-12-30 | End: 2024-12-30 | Stop reason: HOSPADM

## 2024-12-30 RX ORDER — HEPARIN SODIUM AND DEXTROSE 10000; 5 [USP'U]/100ML; G/100ML
18 INJECTION INTRAVENOUS ONCE
Status: COMPLETED | OUTPATIENT
Start: 2024-12-30 | End: 2024-12-30

## 2024-12-30 RX ORDER — CLOPIDOGREL BISULFATE 75 MG/1
75 TABLET ORAL NIGHTLY
Status: DISCONTINUED | OUTPATIENT
Start: 2024-12-30 | End: 2024-12-31

## 2024-12-30 RX ORDER — HYDROMORPHONE HYDROCHLORIDE 1 MG/ML
0.2 INJECTION, SOLUTION INTRAMUSCULAR; INTRAVENOUS; SUBCUTANEOUS EVERY 2 HOUR PRN
Status: DISCONTINUED | OUTPATIENT
Start: 2024-12-30 | End: 2025-01-01

## 2024-12-30 RX ORDER — PROCHLORPERAZINE EDISYLATE 5 MG/ML
5 INJECTION INTRAMUSCULAR; INTRAVENOUS EVERY 8 HOURS PRN
Status: DISCONTINUED | OUTPATIENT
Start: 2024-12-30 | End: 2024-12-30 | Stop reason: HOSPADM

## 2024-12-30 RX ORDER — HYDROCODONE BITARTRATE AND ACETAMINOPHEN 5; 325 MG/1; MG/1
2 TABLET ORAL ONCE AS NEEDED
Status: DISCONTINUED | OUTPATIENT
Start: 2024-12-30 | End: 2024-12-30 | Stop reason: HOSPADM

## 2024-12-30 RX ORDER — NALOXONE HYDROCHLORIDE 0.4 MG/ML
80 INJECTION, SOLUTION INTRAMUSCULAR; INTRAVENOUS; SUBCUTANEOUS AS NEEDED
Status: DISCONTINUED | OUTPATIENT
Start: 2024-12-30 | End: 2024-12-30 | Stop reason: HOSPADM

## 2024-12-30 RX ORDER — SODIUM CHLORIDE, SODIUM LACTATE, POTASSIUM CHLORIDE, CALCIUM CHLORIDE 600; 310; 30; 20 MG/100ML; MG/100ML; MG/100ML; MG/100ML
INJECTION, SOLUTION INTRAVENOUS CONTINUOUS
Status: DISCONTINUED | OUTPATIENT
Start: 2024-12-30 | End: 2024-12-30 | Stop reason: HOSPADM

## 2024-12-30 RX ORDER — HYDROCODONE BITARTRATE AND ACETAMINOPHEN 5; 325 MG/1; MG/1
1 TABLET ORAL ONCE AS NEEDED
Status: DISCONTINUED | OUTPATIENT
Start: 2024-12-30 | End: 2024-12-30 | Stop reason: HOSPADM

## 2024-12-30 RX ORDER — ONDANSETRON 2 MG/ML
INJECTION INTRAMUSCULAR; INTRAVENOUS AS NEEDED
Status: DISCONTINUED | OUTPATIENT
Start: 2024-12-30 | End: 2024-12-30 | Stop reason: SURG

## 2024-12-30 RX ADMIN — MIDAZOLAM HYDROCHLORIDE 2 MG: 1 INJECTION INTRAMUSCULAR; INTRAVENOUS at 13:37:00

## 2024-12-30 RX ADMIN — ONDANSETRON 4 MG: 2 INJECTION INTRAMUSCULAR; INTRAVENOUS at 14:28:00

## 2024-12-30 RX ADMIN — HEPARIN SODIUM 10000 UNITS: 1000 INJECTION, SOLUTION INTRAVENOUS; SUBCUTANEOUS at 14:31:00

## 2024-12-30 RX ADMIN — DEXAMETHASONE SODIUM PHOSPHATE 4 MG: 4 MG/ML VIAL (ML) INJECTION at 14:28:00

## 2024-12-30 RX ADMIN — ROCURONIUM BROMIDE 50 MG: 10 INJECTION, SOLUTION INTRAVENOUS at 13:38:00

## 2024-12-30 RX ADMIN — HEPARIN SODIUM 4000 UNITS: 1000 INJECTION, SOLUTION INTRAVENOUS; SUBCUTANEOUS at 14:45:00

## 2024-12-30 RX ADMIN — DEXAMETHASONE SODIUM PHOSPHATE 4 MG: 4 MG/ML VIAL (ML) INJECTION at 13:47:00

## 2024-12-30 NOTE — PROGRESS NOTES
Discussed operative findings with family    Mother notified me patient having compliance issues with lovenox    Will need hematology evaluation to determine if patient can go to eliquis instead of warfarin  Patient may need to be in hospital on heparin until therapeutic on warfarin if warfarin is recommended plan

## 2024-12-30 NOTE — ANESTHESIA PROCEDURE NOTES
Arterial Line    Performed by: Gus Brown MD  Authorized by: Gus Brown MD    General Information and Staff    Procedure Start:   Anesthesiologist:  Gus Brown MD  Performed By:  Anesthesiologist  Patient Location:  OR  Indication: continuous blood pressure monitoring and blood sampling needed    Site Identification: real time ultrasound guided    Preanesthetic Checklist: 2 patient identifiers, IV checked, risks and benefits discussed, monitors and equipment checked, pre-op evaluation, timeout performed, anesthesia consent and sterile technique used    Procedure Details    Catheter Size:  20 G  Catheter Length:  5 inch  Catheter Type:  Arrow  Seldinger Technique?: Yes    Laterality:  Right  Site:  Radial artery  Site Prep: chlorhexidine    Line Secured:  Tape and Tegaderm    Assessment    Events: patient tolerated procedure well with no complications      Medications      Additional Comments    Left rena attempted to be wired and long catheter placed, failed attempt to regain access of left arm, right arm radial placed and 5 inch catheter inserted.

## 2024-12-30 NOTE — ANESTHESIA PROCEDURE NOTES
Arterial Line    Performed by: Gus Brown MD  Authorized by: Gus Brown MD    General Information and Staff    Procedure Start:   Anesthesiologist:  Gus Brown MD  Performed By:  Anesthesiologist  Patient Location:  OR  Indication: continuous blood pressure monitoring and blood sampling needed    Site Identification: real time ultrasound guided    Preanesthetic Checklist: 2 patient identifiers, IV checked, risks and benefits discussed, monitors and equipment checked, pre-op evaluation, timeout performed, anesthesia consent and sterile technique used    Procedure Details    Catheter Size:  20 G  Catheter Length:  1 and 3/4 inch  Catheter Type:  Arrow  Seldinger Technique?: Yes    Laterality:  Left  Site:  Radial artery  Site Prep: chlorhexidine    Line Secured:  Tape and Tegaderm    Assessment    Events: patient tolerated procedure well with no complications      Medications      Additional Comments

## 2024-12-30 NOTE — ANESTHESIA POSTPROCEDURE EVALUATION
[facility]    Carmela Bell Patient Status:  Inpatient   Age/Gender 24 year old female MRN [id number]   Location [facility] CARDIOVASCULAR SURGERY Attending Jamaal Koroma MD   Hosp Day # 0 PCP Deven Sharif DO       Anesthesia Post-op Note    BILATERAL LOWER EXTREMITY PERCUTANEOUS THROMBECTOMY WITH POSSIBLE ANGIOPLASTY OF BILATERAL EXTERNAL ILIAC VEINS AND COMMON FEMORAL VEINS; SEE CATH LAB CHARTING FOR ADDITIONAL DETAILS    Procedure Summary       Date: 12/30/24 Room / Location:  [facility] 03 HYBRID /  [facility]    Anesthesia Start: 1335 Anesthesia Stop: 1546    Procedure: BILATERAL LOWER EXTREMITY PERCUTANEOUS THROMBECTOMY WITH POSSIBLE ANGIOPLASTY OF BILATERAL EXTERNAL ILIAC VEINS AND COMMON FEMORAL VEINS; SEE CATH LAB CHARTING FOR ADDITIONAL DETAILS (Bilateral) Diagnosis: (Acute Deep Vein Thrombosis of both Iliofemoral Veins; Inferior Vena Cavial Thrombosis)    Surgeons: Jamaal Koroma MD Anesthesiologist: Gus Brown MD    Anesthesia Type: general ASA Status: 3            Anesthesia Type: general    Vitals Value Taken Time   /69 12/30/24 1550   Temp 97.5 12/30/24 1552   Pulse 92 12/30/24 1551   Resp 19 12/30/24 1551   SpO2 99 % 12/30/24 1551   Vitals shown include unfiled device data.    Patient Location: PACU    Anesthesia Type: general    Airway Patency: patent    Postop Pain Control: adequate    Mental Status: mildly sedated but able to meaningfully participate in the post-anesthesia evaluation    Nausea/Vomiting: none    Cardiopulmonary/Hydration status: stable euvolemic    Complications: no apparent anesthesia related complications    Postop vital signs: stable    Dental Exam: Unchanged from Preop    Patient to be discharged from PACU when criteria met.

## 2024-12-30 NOTE — BRIEF OP NOTE
Pre-Operative Diagnosis: Acute Deep Vein Thrombosis of both Iliofemoral Veins; Inferior Vena Cavial Thrombosis     Post-Operative Diagnosis: Acute Deep Vein Thrombosis of both Iliofemoral Veins; Inferior Vena Cavial Thrombosis     Procedure Performed:   US percutaneous access left and right popliteal vein   Venogram and venacavagram   Percutaneous thrombectomy with inari clottriever of right common iliac vein, external iliac vein and femoral vein   Balloon angioplasty of right common iliac vein, external iliac vein and femoral vein  Percutaneous thrombectomy with inari clottriever of left common iliac vein, external iliac vein and femoral vein     Surgeons and Role:     * Jamaal Koroma MD - Primary    Assistant(s):  Surgical Assistant.: Jacinta Lai, RSA     Surgical Findings:   Vena cava patent   Residual thrombus in common iliac veins,, external iliac and femoral veins treated with percutaneous thrombectomy   Right external iliac vein stenosis treated with angioplasty      Specimen: none      Estimated Blood Loss: 200 mL     Jamaal Koroma MD  12/30/2024  3:14 PM

## 2024-12-30 NOTE — ANESTHESIA PREPROCEDURE EVALUATION
PRE-OP EVALUATION    Patient Name: Carmela Bell    Admit Diagnosis: Thrombus    Pre-op Diagnosis: Acute Deep Vein Thrombosis of both Iliofemoral Veins; Inferior Vena Cavial Thrombosis    BILATERAL LOWER EXTREMITY PERCUTANEOUS THROMBECTOMY WITH POSSIBLE ANGIOPLASTY OF BILATERAL EXTERNAL ILIAC VEINS AND COMMON FEMORAL VEINS    Anesthesia Procedure: BILATERAL LOWER EXTREMITY PERCUTANEOUS THROMBECTOMY WITH POSSIBLE ANGIOPLASTY OF BILATERAL EXTERNAL ILIAC VEINS AND COMMON FEMORAL VEINS (Bilateral)    Surgeons and Role:     * Jamaal Koroma MD - Primary    Pre-op vitals reviewed.  Temp: 98.1 °F (36.7 °C)  Pulse: 90  Resp: 20  BP: 131/90  SpO2: 100 %  Body mass index is 35.62 kg/m².    Current medications reviewed.  Hospital Medications:  No current facility-administered medications on file as of 12/30/2024.       Outpatient Medications:   Prescriptions Prior to Admission[1]    Allergies: Mastisol adhesive and Nystatin      Anesthesia Evaluation    Patient summary reviewed.    Anesthetic Complications           GI/Hepatic/Renal                                 Cardiovascular                (+) obesity     (+) hyperlipidemia                                  Endo/Other                                  Pulmonary      (+) asthma              (+) sleep apnea       Neuro/Psych      (+) depression           (+) neuromuscular disease             Patient Active Problem List:     BMI (body mass index), pediatric 95-99% for age, obese child structured weight management/multidisciplinary intervention category     PTSD (post-traumatic stress disorder)     Episodic mood disorder (HCC)     Anxiety state     Subclinical hypothyroidism     Vitamin D deficiency     Contusion of right wrist, sequela     Chronic wrist pain, right     Ulnar neuropathy of right upper extremity     Acute pain of left knee     Contusion of left knee, subsequent encounter     Hip pain     History of MRSA infection     Encounter for therapeutic drug  monitoring     Morbid obesity with BMI of 40.0-44.9, adult (HCC)     Thrombocytopenia (HCC)     Jaundice     Pregnancy (HCC)     Venous thromboembolism     Hyponatremia     Anemia     Pelvic hematoma, postpartum (HCC)     Hypotension due to hypovolemia     Acute blood loss anemia     Post-op pain     Sepsis due to undetermined organism (HCC)     Acute deep vein thrombosis (DVT) of distal vein of both lower extremities (HCC)     Weakness of both lower extremities     Cellulitis of right lower extremity     Inferior vena caval thrombosis (HCC)     Hypercoagulable state (HCC)            Past Surgical History:   Procedure Laterality Date    Abdominoplasty      Adenoidectomy             delivery+postpartum care  2018    Cholecystectomy  2018    Lap sleeve gastrectomy  2019    Myringotomy, laser-assisted Bilateral     Other surgical history Bilateral     2018- ankle surgery    Tonsillectomy       Social History     Socioeconomic History    Marital status: Single   Tobacco Use    Smoking status: Never     Passive exposure: Never    Smokeless tobacco: Never   Vaping Use    Vaping status: Never Used   Substance and Sexual Activity    Alcohol use: Not Currently    Drug use: No    Sexual activity: Yes     Partners: Male     Birth control/protection: I.U.D.     Comment: Kyleena     History   Drug Use No     Available pre-op labs reviewed.  Lab Results   Component Value Date    WBC 5.3 2024    RBC 2.99 (L) 2024    HGB 9.2 (L) 2024    HCT 29.4 (L) 2024    MCV 98.3 2024    MCH 30.8 2024    MCHC 31.3 2024    RDW 15.3 2024    .0 2024     Lab Results   Component Value Date     2024    K 3.6 2024     2024    CO2 30.0 2024    BUN <5 (L) 2024    CREATSERUM 0.44 (L) 2024    GLU 97 2024    CA 8.9 2024     Lab Results   Component Value Date    INR 0.97 2024         Airway      Mallampati:  II  Mouth opening: >3 FB  TM distance: > 6 cm  Neck ROM: full Cardiovascular    Cardiovascular exam normal.         Dental    Dentition appears grossly intact         Pulmonary    Pulmonary exam normal.                 Other findings              ASA: 3   Plan: general  NPO status verified and patient meets guidelines.        Comment:  I explained intrinsic risks of general anesthesia, including nausea, dental damage, sore throat, mouth injury,and hoarseness from airway management.  All questions were answered and understanding was demonstrated of risks.  Informed permission was obtained to proceed as documented in the signed consent form.        Plan/risks discussed with: patient                Present on Admission:  **None**             [1]   Medications Prior to Admission   Medication Sig Dispense Refill Last Dose/Taking    HYDROcodone-acetaminophen  MG Oral Tab Take 1 tablet by mouth every 6 (six) hours as needed for Pain.   12/29/2024    enoxaparin 120 MG/0.8ML Injection Solution Prefilled Syringe Inject 0.82 mL (123 mg total) into the skin 2 (two) times daily for 30 doses. 24.6 mL 0 12/29/2024    gabapentin 100 MG Oral Cap Take 1 capsule (100 mg total) by mouth 3 (three) times daily. 90 capsule 0 12/29/2024    clopidogrel 75 MG Oral Tab Take 1 tablet (75 mg total) by mouth daily. 30 tablet 1 12/29/2024    lidocaine-menthol 4-1 % External Patch Place 1 patch onto the skin daily as needed.   Past Month    cyanocobalamin 1000 MCG Oral Tab Take 1 tablet (1,000 mcg total) by mouth daily. 90 tablet 0 Past Week    folic acid 1 MG Oral Tab Take 1 tablet (1 mg total) by mouth daily. 90 tablet 0 12/29/2024

## 2024-12-30 NOTE — ANESTHESIA PROCEDURE NOTES
Peripheral IV  Date/Time: 12/30/2024 1:48 PM  Inserted by: Gus Brown MD    Placement  Needle size: 16 G  Laterality: left  Location: forearm  Local anesthetic: none  Site prep: chlorhexidine  Technique: ultrasound guided  Attempts: 1

## 2024-12-30 NOTE — ANESTHESIA PROCEDURE NOTES
Airway  Date/Time: 12/30/2024 1:40 PM  Urgency: elective      General Information and Staff    Patient location during procedure: OR  Anesthesiologist: Gus Brown MD  Performed: anesthesiologist   Performed by: Gus Brown MD  Authorized by: Gus Brown MD      Indications and Patient Condition  Indications for airway management: anesthesia  Spontaneous Ventilation: absent  Sedation level: deep  Preoxygenated: yes  Patient position: sniffing  Mask difficulty assessment: 1 - vent by mask    Final Airway Details  Final airway type: endotracheal airway      Successful airway: ETT  Cuffed: yes   Successful intubation technique: direct laryngoscopy  Endotracheal tube insertion site: oral  Blade: Rodger  Blade size: #3  ETT size (mm): 7.5    Cormack-Lehane Classification: grade I - full view of glottis  Placement verified by: capnometry   Measured from: lips  ETT to lips (cm): 21  Number of attempts at approach: 1

## 2024-12-31 LAB
APTT PPP: 170.2 SECONDS (ref 23–36)
APTT PPP: 79.1 SECONDS (ref 23–36)
BASOPHILS # BLD AUTO: 0.02 X10(3) UL (ref 0–0.2)
BASOPHILS NFR BLD AUTO: 0.3 %
BLOOD TYPE BARCODE: 5100
EOSINOPHIL # BLD AUTO: 0.13 X10(3) UL (ref 0–0.7)
EOSINOPHIL NFR BLD AUTO: 2.2 %
ERYTHROCYTE [DISTWIDTH] IN BLOOD BY AUTOMATED COUNT: 15.6 %
HCT VFR BLD AUTO: 26.8 %
HGB BLD-MCNC: 8.6 G/DL
IMM GRANULOCYTES # BLD AUTO: 0.03 X10(3) UL (ref 0–1)
IMM GRANULOCYTES NFR BLD: 0.5 %
INR BLD: 1.11 (ref 0.8–1.2)
LYMPHOCYTES # BLD AUTO: 1.64 X10(3) UL (ref 1–4)
LYMPHOCYTES NFR BLD AUTO: 27.5 %
MCH RBC QN AUTO: 31.2 PG (ref 26–34)
MCHC RBC AUTO-ENTMCNC: 32.1 G/DL (ref 31–37)
MCV RBC AUTO: 97.1 FL
MONOCYTES # BLD AUTO: 0.61 X10(3) UL (ref 0.1–1)
MONOCYTES NFR BLD AUTO: 10.2 %
NEUTROPHILS # BLD AUTO: 3.54 X10 (3) UL (ref 1.5–7.7)
NEUTROPHILS # BLD AUTO: 3.54 X10(3) UL (ref 1.5–7.7)
NEUTROPHILS NFR BLD AUTO: 59.3 %
PLATELET # BLD AUTO: 239 10(3)UL (ref 150–450)
PLATELET # BLD AUTO: 258 10(3)UL (ref 150–450)
PROTHROMBIN TIME: 14.5 SECONDS (ref 11.6–14.8)
RBC # BLD AUTO: 2.76 X10(6)UL
UNIT VOLUME: 350 ML
WBC # BLD AUTO: 6 X10(3) UL (ref 4–11)

## 2024-12-31 PROCEDURE — 99254 IP/OBS CNSLTJ NEW/EST MOD 60: CPT | Performed by: NURSE PRACTITIONER

## 2024-12-31 RX ORDER — WARFARIN SODIUM 5 MG/1
5 TABLET ORAL
Status: DISCONTINUED | OUTPATIENT
Start: 2024-12-31 | End: 2024-12-31

## 2024-12-31 RX ORDER — POLYETHYLENE GLYCOL 3350 17 G/17G
17 POWDER, FOR SOLUTION ORAL DAILY
Status: DISCONTINUED | OUTPATIENT
Start: 2024-12-31 | End: 2025-01-01

## 2024-12-31 RX ORDER — HYDROCODONE BITARTRATE AND ACETAMINOPHEN 10; 325 MG/1; MG/1
1 TABLET ORAL EVERY 4 HOURS PRN
Qty: 30 TABLET | Refills: 0 | Status: SHIPPED | OUTPATIENT
Start: 2024-12-31 | End: 2025-01-01

## 2024-12-31 RX ORDER — DOCUSATE SODIUM 100 MG/1
100 CAPSULE, LIQUID FILLED ORAL 2 TIMES DAILY
Status: DISCONTINUED | OUTPATIENT
Start: 2024-12-31 | End: 2025-01-01

## 2024-12-31 NOTE — PLAN OF CARE
Problem: Patient/Family Goals  Goal: Patient/Family Long Term Goal  Description: Patient's Long Term Goal: stay out of hospital 12-31    Interventions:  - med compliance, follow ups    - See additional Care Plan goals for specific interventions  Outcome: Progressing  Goal: Patient/Family Short Term Goal  Description: Patient's Short Term Goal: no pain 12-31    Interventions:   - PRN pain meds, hot/cold packs, tell nurse when in pain     - See additional Care Plan goals for specific interventions  Outcome: Progressing     Problem: PAIN - ADULT  Goal: Verbalizes/displays adequate comfort level or patient's stated pain goal  Description: INTERVENTIONS:  - Encourage pt to monitor pain and request assistance  - Assess pain using appropriate pain scale  - Administer analgesics based on type and severity of pain and evaluate response  - Implement non-pharmacological measures as appropriate and evaluate response  - Consider cultural and social influences on pain and pain management  - Manage/alleviate anxiety  - Utilize distraction and/or relaxation techniques  - Monitor for opioid side effects  - Notify MD/LIP if interventions unsuccessful or patient reports new pain  - Anticipate increased pain with activity and pre-medicate as appropriate  Outcome: Progressing  Received in bed. Pt A&Ox4. Denies any cp or sob. L popliteal. puncture site with gauze and tape D/I/C, no oozing, no hematom a noted. R popliteal puncture site with oozing, no hematoma; R leg dressing changed, quick clot and manual pressure x10 min applied-will continue to monitor. Fall precautions continued.  Labs and meds as ordered. Monitor B legs popliteal puncture sites for any oozing or hematoma. Pain meds as ordered prn. Continue fall precautions.
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
## 2024-12-31 NOTE — CM/SW NOTE
SW received order for wafarin clinic/management at discharge from Vascular Surgery. Per Hem/Onc note today: \"ok for patient to transition to eliquis once patient is deemed appropriate to do so from vascular surgery perspective. She does have plenty of eliquis at home, per her report\" order acknowledged.     Desi MAYER, LSW  Discharge Planner

## 2024-12-31 NOTE — PLAN OF CARE
Recieved pt at 0730.   Pt is A&Ox4, pain at R&L popliteal incision sites- PRN dilaudid given and heat packs applied. On room air, lungs are clear, no coughing. NSR, no chest pain. Purewick in place, last BM 12-30. Pt updated with plan of care.     Approx 0800- Dr Martínez at bedside, R&L popliteal sutures removed.     Approx 0830- R&L popliteal incision sites bleeding, notified Dr Martínez- per MD normal- gauze and heat packs applied.     Approx 1245- pt R popliteal incision bleed through 2 gauze/ Tegaderm dressing x2. Notified Dr Martínez, per MD normal- quick clot, gauze, and Tegaderm reapplied. Light ace bandage applied and pillow for elevation per MD.     Approx 1445- pt continuous to bleed form R popliteal incision- dr martínez notified. Stop heparin, plavix, coumadin. Start eliquis. R popliteal incision redressed and elevated.     POC  - heparin gtt  - heme to decide: warfarin vs eliquis  - ambulate    Problem: Patient/Family Goals  Goal: Patient/Family Long Term Goal  Description: Patient's Long Term Goal: stay out of hospital 12-31    Interventions:  - med compliance, follow ups    - See additional Care Plan goals for specific interventions  Outcome: Progressing  Goal: Patient/Family Short Term Goal  Description: Patient's Short Term Goal: no pain 12-31    Interventions:   - PRN pain meds, hot/cold packs, tell nurse when in pain     - See additional Care Plan goals for specific interventions  Outcome: Progressing     Problem: PAIN - ADULT  Goal: Verbalizes/displays adequate comfort level or patient's stated pain goal  Description: INTERVENTIONS:  - Encourage pt to monitor pain and request assistance  - Assess pain using appropriate pain scale  - Administer analgesics based on type and severity of pain and evaluate response  - Implement non-pharmacological measures as appropriate and evaluate response  - Consider cultural and social influences on pain and pain management  - Manage/alleviate anxiety  - Utilize  distraction and/or relaxation techniques  - Monitor for opioid side effects  - Notify MD/LIP if interventions unsuccessful or patient reports new pain  - Anticipate increased pain with activity and pre-medicate as appropriate  Outcome: Progressing

## 2024-12-31 NOTE — PLAN OF CARE
Assumed care for pt at 19:30 on 12/30    A&Ox4. Prn iv dilaudid and norco to manage pain. BP low, [facility] hospitalist notified, improved after 500cc bolus. Other VSS on NC, weaned to RA by end of shift. NSR on tele. Both legs wrapped in ACE, Flowstasis device palpated behind knees. Both legs +2 pedal pulse, sensation intact without tingling or numbness. Heparin infusing PE/DVT protocol. Initial INR = 1.25, Coumadin  given.Phlebotomy with difficulty drawing PTT at original time, drawn at 05:12. Pt declined to get OOB, stating R ear hearing impairment and vertigo. Purewick placed.     Plan: Flowstasis devices to be removed by Dr. Koroma. Hematology to speak with pt regarding AC. Potential dc?     Bed alarm on, call light in reach, able to make needs known.

## 2024-12-31 NOTE — OPERATIVE REPORT
Pre-Operative Diagnosis: Acute Deep Vein Thrombosis of both Iliofemoral Veins; Inferior Vena Caval Thrombosis, morbid obesity     Post-Operative Diagnosis: Acute Deep Vein Thrombosis of both Iliofemoral Veins; Inferior Vena Caval Thrombosis, morbid obesity     Procedure Performed:   US percutaneous access left and right popliteal vein   Venogram and venacavagram   Percutaneous thrombectomy with inari clottriever of right common iliac vein, external iliac vein and femoral vein   Balloon angioplasty of right common iliac vein, external iliac vein and femoral vein  Percutaneous thrombectomy with inari clottriever of left common iliac vein, external iliac vein and femoral vein      Surgeons and Role:     * Jamaal Koroma MD - Primary     Assistant(s):  Surgical Assistant.: Jacinta Lai, RSA     Surgical Findings:   Vena cava patent   Residual thrombus in common iliac veins,, external iliac and femoral veins treated with percutaneous thrombectomy   Right external iliac vein stenosis treated with angioplasty      Specimen: none      Estimated Blood Loss: 200 mL     Brief history: This is a 24-year-old female with significant history of a caval thrombosis after filter placement.  She underwent a complex percutaneous thrombectomy just over a week ago and is still having symptoms associated with residual thrombus in her common femoral veins.  Will proceed with percutaneous thrombectomy as described below.    Details of procedure: Patient was taken to the hybrid operating room and placed under general anesthesia.  Patient was positioned in the prone position with appropriate padding.  Timeout performed.  Patient was prepped and draped in usual sterile fashion.  Timeout performed.  Using ultrasound I percutaneously accessed the left and right popliteal veins with micropuncture kits.  Through the micropuncture sheath I confirmed successful venous cannulation with injecting contrast into both popliteal veins.  I then  advanced a Glidewire advantage wires into the vena cava from the left and the right across residual thrombus in the distal external iliac veins and common femoral veins after patient was heparinized.  I then placed 8 [place] sheath on both sides.  I then used a Watt cross on each side to advance the wire into the right internal jugular vein position the tip of the wire in the right internal jugular vein.  Starting on the patient's right I then exchanged the 8 [place] sheath for the 13 [place] Inari sheath and an Inari clot triever device was opened and prepped.  I then performed percutaneous thrombectomy of the right common iliac external iliac and common femoral vein and superficial femoral vein with a series of 4 passes.  First 3 passes yielded a significant amount of thrombus and the fourth pass was empty with no thrombus.  I then performed a venogram through the 13 [place] sheath and there was no evidence of any residual thrombus but there was some webs consistent with a chronic DVT and there was moderate narrowing of the external iliac vein that could be compression from the padding that was on her abdominal wall with her in the prone position.  Given the patency and her petite veins at baseline I did not want to place a stent.  I performed angioplasty with a 12 x 6 [place] balloon of the right common iliac vein and external iliac vein and proximal common femoral vein with a series of inflations to nominal.  Repeat angiogram showed some mild improvement.  Satisfied with the patency of the vein I then turned my attention the left side.  A second 13 [place]ian sheath was opened and prepped and the 8 [place] sheath on the left was exchanged for the 13 [place] Inari sheath.  Using the clot Treiver I then did a series of passes through the left common iliac, external iliac common femoral vein and femoral vein.  Last 2 passes were negative with no thrombus.  Repeat venogram showed wide patency.  There was some residual thrombus  at the ostium of the saphenous vein but no significant occlusive thrombus remained in the common femoral vein.  Satisfied with this I proceeded to stop the procedure and all devices were removed and temporary stitches were applied to each access site and patient was returned to the supine position and extubated.  She was taken to recovery in the PACU in stable condition.

## 2024-12-31 NOTE — PROGRESS NOTES
Assumed care of pt approx 1625. Alert and oriented x4. Maintains o2 sat on RA. NSR on tele monitor. Bedrest until specified. BLE sites w/ flowstasis intact CHUNG per Dr. Koroma will be assessed in the morning. Pedal pulses palpable. Personal possessions and call light within reach. Bed locked, in lowest position. Updated on plan of care.

## 2024-12-31 NOTE — PROGRESS NOTES
Doing well   Sutures removed     Continue heparin for at least 24hours   Await hematology recs - if recommend eliquis can switch tomorrow, if recommend warfarin, patient will need to be in hospital until therapeutic due to compliance with lovenox     Can shower   ambulate

## 2024-12-31 NOTE — PROGRESS NOTES
Formerly [facility], [facility]–1983 Pharmacy Dosing Service  Warfarin (Coumadin) Initial Dosing    Carmela Bell is a 24 year old patient for whom pharmacy has been consulted to dose warfarin (COUMADIN) for Prophylaxis of venous thrombosis by Dr. Koroma.  Based on this indication, goal INR is 2-3.    Pertinent Patient Medical History:  bilateral LE DVT s/p thrombectomy  Potential Drug Interactions:  clopidogrel    Latest INR:   Recent Labs     12/30/24  1618   INR 1.25*       Other Anticoagulants:  heparin infusion  Home regimen (if applicable):  new start   Date/Time last dose was given (if applicable): N/A    Based on above -  1.  For today, Give warfarin (COUMADIN) 5 mg at 2100 tonight    2.  PT/INR ordered daily while on warfarin    3.  Pharmacy will continue to follow.  We appreciate the opportunity to assist in the care of this patient.    Cara Villarreal, PharmD  12/30/2024  7:05 PM

## 2024-12-31 NOTE — PROGRESS NOTES
[facility] Pharmacy Dosing Service  Warfarin (Coumadin) Subsequent Dosing    Carmela Bell is a 24 year old patient for whom pharmacy is dosing warfarin (Coumadin). Goal INR is 2-3    Recent Labs   Lab 12/27/24  1218 12/30/24  1618 12/31/24  0512   INR 0.97 1.25* 1.11       Consulted by:  Dr Koroma  Indication:  prophylaxis of VTE  Potential Drug Interactions:  clopidogrel (stable home med) - can increase risk of bleeding  Other Anticoagulants:  heparin gtt  Home regimen (if applicable):  n/a -- new start    Inpatient Dosing History:    Date 12/30 12/31       INR 1.25 1.11       Coumadin dose 5mg                 Based on above -  1.  For today, Give warfarin (COUMADIN) 5 mg at 2100 tonight  2   PT/INR ordered daily while on warfarin  3.  Pharmacy will continue to follow.  We appreciate the opportunity to assist in the care of this patient.    Naye Cota, PharmD  12/31/2024  11:18 AM

## 2025-01-01 VITALS
RESPIRATION RATE: 18 BRPM | HEART RATE: 81 BPM | OXYGEN SATURATION: 96 % | SYSTOLIC BLOOD PRESSURE: 101 MMHG | TEMPERATURE: 98 F | DIASTOLIC BLOOD PRESSURE: 66 MMHG | HEIGHT: 69 IN | WEIGHT: 251.13 LBS | BODY MASS INDEX: 37.2 KG/M2

## 2025-01-01 RX ORDER — HYDROCODONE BITARTRATE AND ACETAMINOPHEN 10; 325 MG/1; MG/1
1 TABLET ORAL EVERY 4 HOURS PRN
Qty: 30 TABLET | Refills: 0 | Status: SHIPPED | OUTPATIENT
Start: 2025-01-01
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
## 2025-01-01 NOTE — PROGRESS NOTES
Vascular Surgery Progress Note    /66   Pulse 81   Temp 97.7 °F (36.5 °C) (Oral)   Resp 18   Ht 5' 9\" (1.753 m)   Wt 251 lb 1.7 oz (113.9 kg)   LMP  (LMP Unknown)   SpO2 96%   BMI 37.08 kg/m²     Recent Labs   Lab 12/27/24  1218 12/30/24  1620 12/31/24  0512 12/31/24  1758   RBC 2.99* 2.87*  --  2.76*   HGB 9.2* 9.1*  --  8.6*   HCT 29.4* 27.4*  --  26.8*   MCV 98.3 95.5  --  97.1   MCH 30.8 31.7  --  31.2   MCHC 31.3 33.2  --  32.1   RDW 15.3 15.3  --  15.6   NEPRELIM 2.95  --   --  3.54   WBC 5.3 5.7  --  6.0   .0 251.0 239.0 258.0       Recent Labs   Lab 12/27/24  1218 12/30/24  1620   GLU 97 116*   BUN <5* <5*   CREATSERUM 0.44* 0.45*   CA 8.9 8.4*    139   K 3.6 3.7    107   CO2 30.0 26.0       Patient seen and examined.  No further swelling.  Tolerating p.o., ambulating and voiding without issue.  No further bleeding issues.  She is okay for discharge home.  I informed her that should she have any strikethrough on her access sites she should hold pressure and place gauze.  She should wear compression stockings and elevate during the day and take off at night.  She will need follow-up in 2 to 3 weeks.  She will continue her Eliquis.    Kirby Mills MD  [facility]  Vascular Surgery

## 2025-01-01 NOTE — PLAN OF CARE
Pt is ok to discharge per primary and consults. Discharge instructions including meds & follow ups given. Patient verbalizes understanding & questions answered. IV removed, tele monitor discontinued, all belongings taken with patient. Pt transported off unit via wheelchair to [facility] with mom.

## 2025-01-01 NOTE — PLAN OF CARE
Received pt at 0730.   Pt is A&Ox4, pain at R&L popliteal incision sites, PRN norco available. On room air, lungs are clear/diminished, no coughing. NSR, no chest pain. Purewick in place, last BM 12-30, pt refusing stool softeners. R&L popliteal incisions in place, no active drainage, dressings in place. Pt updated with plan of care.     POC  - walk in rocha  - round with MD's    Problem: Patient/Family Goals  Goal: Patient/Family Long Term Goal  Description: Patient's Long Term Goal: stay out of hospital 12-31    Interventions:  - med compliance, follow ups    - See additional Care Plan goals for specific interventions  Outcome: Progressing  Goal: Patient/Family Short Term Goal  Description: Patient's Short Term Goal: no pain 12-31  Go home 1-1    Interventions:   - PRN pain meds, hot/cold packs, tell nurse when in pain   - round with MD's, walk in rocha    - See additional Care Plan goals for specific interventions  Outcome: Progressing     Problem: PAIN - ADULT  Goal: Verbalizes/displays adequate comfort level or patient's stated pain goal  Description: INTERVENTIONS:  - Encourage pt to monitor pain and request assistance  - Assess pain using appropriate pain scale  - Administer analgesics based on type and severity of pain and evaluate response  - Implement non-pharmacological measures as appropriate and evaluate response  - Consider cultural and social influences on pain and pain management  - Manage/alleviate anxiety  - Utilize distraction and/or relaxation techniques  - Monitor for opioid side effects  - Notify MD/LIP if interventions unsuccessful or patient reports new pain  - Anticipate increased pain with activity and pre-medicate as appropriate  Outcome: Progressing

## 2025-01-01 NOTE — PLAN OF CARE
Assumed care at 2300. Patient is alert, oriented x4. On room air maintaining SPO2 >90%. Post thrombectomy. Left leg incision is covered with gauze- c/d/I. Right leg incision is bleeding. Provider aware per AM nurse. Dressing changed and closely monitored. 0100- bleeding controled.  Norco given for operative pain- partially effective. Vitally stable. Tele showing NSR. All concern and question addressed.      Problem: Patient/Family Goals  Goal: Patient/Family Long Term Goal  Description: Patient's Long Term Goal: stay out of hospital 12-31    Interventions:  - med compliance, follow ups  - control bleeding    - See additional Care Plan goals for specific interventions  Outcome: Progressing     Problem: PAIN - ADULT  Goal: Verbalizes/displays adequate comfort level or patient's stated pain goal  Description: INTERVENTIONS:  - Encourage pt to monitor pain and request assistance  - Assess pain using appropriate pain scale  - Administer analgesics based on type and severity of pain and evaluate response  - Implement non-pharmacological measures as appropriate and evaluate response  - Consider cultural and social influences on pain and pain management  - Manage/alleviate anxiety  - Utilize distraction and/or relaxation techniques  - Monitor for opioid side effects  - Notify MD/LIP if interventions unsuccessful or patient reports new pain  - Anticipate increased pain with activity and pre-medicate as appropriate  Outcome: Not Progressing

## 2025-01-01 NOTE — DISCHARGE INSTRUCTIONS
You may shower with soap and water starting on 1/01/2025 .  No soaking in bath or pool for 2 weeks.    You should resume all home medications as previously.    Take Norco as needed for pain. Take stool softeners while taking norco to avoid constipation.    You will need to take eliquis as previously.    No heavy lifting or straining for 2 weeks. You may then resume to normal activity.    Drink plenty of fluids to stay well hydrated.     You will need to followup with Dr. Koroma in the vascular clinic in 2-3 weeks. Please call [phone] to make an appt.    Please wear compression stockings during the day and take off at night.     If you have any fevers, chills, chest pain, shortness of breath, drainage, swelling or bleeding, please call the office in order to return to clinic sooner for evaluation.

## 2025-01-02 LAB
ISTAT ACTIVATED CLOTTING TIME: 245 SECONDS (ref 74–137)
ISTAT ACTIVATED CLOTTING TIME: 268 SECONDS (ref 74–137)

## 2025-01-06 NOTE — PAYOR COMM NOTE
--------------  DISCHARGE   Payor: MARIANELA LEARY  Subscriber #:  [id number]  Authorization Number: [id number]    Admit date: 12/30/24  Admit time:  11:14 AM  Discharge Date: 1/1/2025  3:29 PM

## 2025-01-14 ENCOUNTER — OFFICE VISIT (OUTPATIENT)
Age: 25
End: 2025-01-14
Attending: INTERNAL MEDICINE
Payer: COMMERCIAL

## 2025-01-14 VITALS
HEART RATE: 80 BPM | DIASTOLIC BLOOD PRESSURE: 73 MMHG | BODY MASS INDEX: 36 KG/M2 | RESPIRATION RATE: 16 BRPM | SYSTOLIC BLOOD PRESSURE: 125 MMHG | WEIGHT: 245 LBS | TEMPERATURE: 97 F | OXYGEN SATURATION: 99 %

## 2025-01-14 DIAGNOSIS — I82.90 VENOUS THROMBOEMBOLISM: ICD-10-CM

## 2025-01-14 DIAGNOSIS — D64.9 ANEMIA, UNSPECIFIED TYPE: Primary | ICD-10-CM

## 2025-01-14 PROBLEM — D64.89 OTHER SPECIFIED ANEMIAS: Status: ACTIVE | Noted: 2025-01-14

## 2025-01-14 LAB
ALBUMIN SERPL-MCNC: 3.9 G/DL (ref 3.2–4.8)
ALBUMIN/GLOB SERPL: 1.4 {RATIO} (ref 1–2)
ALP LIVER SERPL-CCNC: 78 U/L
ALT SERPL-CCNC: 9 U/L
ANION GAP SERPL CALC-SCNC: 5 MMOL/L (ref 0–18)
AST SERPL-CCNC: 16 U/L (ref ?–34)
BASOPHILS # BLD AUTO: 0.02 X10(3) UL (ref 0–0.2)
BASOPHILS NFR BLD AUTO: 0.4 %
BILIRUB SERPL-MCNC: 0.4 MG/DL (ref 0.3–1.2)
BUN BLD-MCNC: 8 MG/DL (ref 9–23)
CALCIUM BLD-MCNC: 9.7 MG/DL (ref 8.7–10.6)
CHLORIDE SERPL-SCNC: 108 MMOL/L (ref 98–112)
CO2 SERPL-SCNC: 27 MMOL/L (ref 21–32)
CREAT BLD-MCNC: 0.52 MG/DL
DEPRECATED HBV CORE AB SER IA-ACNC: 258 NG/ML
EGFRCR SERPLBLD CKD-EPI 2021: 131 ML/MIN/1.73M2 (ref 60–?)
EOSINOPHIL # BLD AUTO: 0.13 X10(3) UL (ref 0–0.7)
EOSINOPHIL NFR BLD AUTO: 2.3 %
ERYTHROCYTE [DISTWIDTH] IN BLOOD BY AUTOMATED COUNT: 15 %
FOLATE SERPL-MCNC: 7.7 NG/ML (ref 5.4–?)
GLOBULIN PLAS-MCNC: 2.7 G/DL (ref 2–3.5)
GLUCOSE BLD-MCNC: 94 MG/DL (ref 70–99)
HCT VFR BLD AUTO: 32.5 %
HGB BLD-MCNC: 10.7 G/DL
IMM GRANULOCYTES # BLD AUTO: 0.01 X10(3) UL (ref 0–1)
IMM GRANULOCYTES NFR BLD: 0.2 %
IRON SATN MFR SERPL: 23 %
IRON SERPL-MCNC: 61 UG/DL
LYMPHOCYTES # BLD AUTO: 1.6 X10(3) UL (ref 1–4)
LYMPHOCYTES NFR BLD AUTO: 28.8 %
MCH RBC QN AUTO: 31.3 PG (ref 26–34)
MCHC RBC AUTO-ENTMCNC: 32.9 G/DL (ref 31–37)
MCV RBC AUTO: 95 FL
MONOCYTES # BLD AUTO: 0.54 X10(3) UL (ref 0.1–1)
MONOCYTES NFR BLD AUTO: 9.7 %
NEUTROPHILS # BLD AUTO: 3.25 X10 (3) UL (ref 1.5–7.7)
NEUTROPHILS # BLD AUTO: 3.25 X10(3) UL (ref 1.5–7.7)
NEUTROPHILS NFR BLD AUTO: 58.6 %
OSMOLALITY SERPL CALC.SUM OF ELEC: 288 MOSM/KG (ref 275–295)
PLATELET # BLD AUTO: 180 10(3)UL (ref 150–450)
POTASSIUM SERPL-SCNC: 4.1 MMOL/L (ref 3.5–5.1)
PROT SERPL-MCNC: 6.6 G/DL (ref 5.7–8.2)
RBC # BLD AUTO: 3.42 X10(6)UL
SODIUM SERPL-SCNC: 140 MMOL/L (ref 136–145)
TOTAL IRON BINDING CAPACITY: 271 UG/DL (ref 250–425)
TRANSFERRIN SERPL-MCNC: 199 MG/DL (ref 250–380)
VIT B12 SERPL-MCNC: 221 PG/ML (ref 211–911)
WBC # BLD AUTO: 5.6 X10(3) UL (ref 4–11)

## 2025-01-14 NOTE — PROGRESS NOTES
Patient here for post hospital follow-up.  Still experiencing leg pain and swelling. Has a hardened area to her right groin since having a vascular procedure 12/30/24. Will see surgeon tomorrow. Energy levels are low. She is lightheaded and experiencing headaches. Continues on Eliquis 5 mg BID.
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
## 2025-01-16 ENCOUNTER — APPOINTMENT (OUTPATIENT)
Age: 25
End: 2025-01-16
Attending: INTERNAL MEDICINE

## 2025-01-19 NOTE — DISCHARGE SUMMARY
Vascular Surgery  Surgical Discharge Summary    Admission Date: 2024   D/C Date: 2025  Discharge Diagnosis: dvt  Discharge Condition: good      HISTORY OF PRESENT ILLNESS: Carmela Bell is a 24 year old  female who was admitted for Acute Deep Vein Thrombosis of both Iliofemoral Veins; Inferior Vena Cavial Thrombosis.    PROCEDURES PERFORMED DURING THIS HOSPITALIZATION:   Procedure(s):  VENOGRAM AND VENACAVAGRAM, BILATERAL LOWER EXTREMITY PERCUTANEOUS THROMBECTOMY WITH ANGIOPLASTY OF BILATERAL EXTERNAL ILIAC VEINS AND COMMON FEMORAL VEINS; SEE CATH LAB CHARTING FOR ADDITIONAL DETAILS    MEDICAL HISTORY:  Patient Active Problem List   Diagnosis    BMI (body mass index), pediatric 95-99% for age, obese child structured weight management/multidisciplinary intervention category    PTSD (post-traumatic stress disorder)    Episodic mood disorder (HCC)    Anxiety state    Subclinical hypothyroidism    Vitamin D deficiency    Contusion of right wrist, sequela    Chronic wrist pain, right    Ulnar neuropathy of right upper extremity    Acute pain of left knee    Contusion of left knee, subsequent encounter    Hip pain    History of MRSA infection    Encounter for therapeutic drug monitoring    Morbid obesity with BMI of 40.0-44.9, adult (HCC)    Thrombocytopenia (HCC)    Jaundice    Pregnancy (HCC)    Venous thromboembolism    Hyponatremia    Anemia    Pelvic hematoma, postpartum (HCC)    Hypotension due to hypovolemia    Acute blood loss anemia    Post-op pain    Sepsis due to undetermined organism (HCC)    Acute deep vein thrombosis (DVT) of distal vein of both lower extremities (HCC)    Weakness of both lower extremities    Cellulitis of right lower extremity    Inferior vena caval thrombosis (HCC)    Hypercoagulable state (HCC)    Other specified anemias        SURGICAL HISTORY:  Past Surgical History:   Procedure Laterality Date    Abdominoplasty      Adenoidectomy              delivery+postpartum care  2018    Cholecystectomy  03/23/2018    Lap sleeve gastrectomy  04/2019    Myringotomy, laser-assisted Bilateral     Other surgical history Bilateral     2018- ankle surgery    Tonsillectomy          CURRENT MEDICATIONS:  Current Outpatient Medications   Medication Sig Dispense Refill    HYDROcodone-acetaminophen  MG Oral Tab Take 1 tablet by mouth every 4 (four) hours as needed. 30 tablet 0    apixaban 5 MG Oral Tab Take 1 tablet (5 mg total) by mouth 2 (two) times daily. 60 tablet 0    HYDROcodone-acetaminophen  MG Oral Tab Take 1 tablet by mouth every 6 (six) hours as needed for Pain.      gabapentin 100 MG Oral Cap Take 1 capsule (100 mg total) by mouth 3 (three) times daily. 90 capsule 0    lidocaine-menthol 4-1 % External Patch Place 1 patch onto the skin daily as needed. (Patient not taking: Reported on 1/14/2025)      cyanocobalamin 1000 MCG Oral Tab Take 1 tablet (1,000 mcg total) by mouth daily. (Patient not taking: Reported on 1/14/2025) 90 tablet 0    folic acid 1 MG Oral Tab Take 1 tablet (1 mg total) by mouth daily. (Patient not taking: Reported on 1/14/2025) 90 tablet 0        LABS AT DISCHARGE:    BMG:   Lab Results   Component Value Date    GLU 94 01/14/2025     01/14/2025    K 4.1 01/14/2025     01/14/2025    CO2 27.0 01/14/2025    BUN 8 (L) 01/14/2025    CA 9.7 01/14/2025       CBC:  Lab Results   Component Value Date    WBC 5.6 01/14/2025    RBC 3.42 (L) 01/14/2025    HGB 10.7 (L) 01/14/2025    HCT 32.5 (L) 01/14/2025    .0 01/14/2025   ]    Coags:  Lab Results   Component Value Date    PTT 79.1 (H) 12/31/2024    INR 1.11 12/31/2024       HOSPITAL COURSE: The patient underwent a percutaneous thrombectom on 12/30/2024   for .    EXAM ON DISCHARGE:  /66   Pulse 81   Temp 97.7 °F (36.5 °C) (Oral)   Resp 18   Ht 5' 9\" (1.753 m)   Wt 251 lb 1.7 oz (113.9 kg)   LMP  (LMP Unknown)   SpO2 96%   BMI 37.08 kg/m²           DISCHARGE  INSTRUCTIONS: Discharge instructions were reviewed with the patient including follow up within 2 weeks

## 2025-02-25 ENCOUNTER — TELEPHONE (OUTPATIENT)
Age: 25
End: 2025-02-25

## 2025-02-25 NOTE — TELEPHONE ENCOUNTER
Jonna RN with anaesthesia at [facility] called and advised the pt is having ear surgery tomorrow and they would like the most recent progress not from Dr. Fernández    Please fax to [phone] or call [phone]

## 2025-03-17 ENCOUNTER — HOSPITAL ENCOUNTER (EMERGENCY)
Facility: HOSPITAL | Age: 25
Discharge: HOME | End: 2025-03-17
Attending: EMERGENCY MEDICINE
Payer: COMMERCIAL

## 2025-03-17 ENCOUNTER — APPOINTMENT (OUTPATIENT)
Dept: CT IMAGING | Facility: HOSPITAL | Age: 25
End: 2025-03-17
Attending: EMERGENCY MEDICINE
Payer: COMMERCIAL

## 2025-03-17 VITALS
BODY MASS INDEX: 36.29 KG/M2 | RESPIRATION RATE: 16 BRPM | HEART RATE: 71 BPM | TEMPERATURE: 97 F | DIASTOLIC BLOOD PRESSURE: 76 MMHG | OXYGEN SATURATION: 99 % | SYSTOLIC BLOOD PRESSURE: 96 MMHG | HEIGHT: 69 IN | WEIGHT: 245 LBS

## 2025-03-17 DIAGNOSIS — R18.8 PELVIC FLUID COLLECTION: ICD-10-CM

## 2025-03-17 DIAGNOSIS — N39.0 URINARY TRACT INFECTION WITH HEMATURIA, SITE UNSPECIFIED: ICD-10-CM

## 2025-03-17 DIAGNOSIS — K43.9 VENTRAL HERNIA WITHOUT OBSTRUCTION OR GANGRENE: Primary | ICD-10-CM

## 2025-03-17 DIAGNOSIS — R31.9 URINARY TRACT INFECTION WITH HEMATURIA, SITE UNSPECIFIED: ICD-10-CM

## 2025-03-17 LAB
ALBUMIN SERPL-MCNC: 4.1 G/DL (ref 3.2–4.8)
ALBUMIN/GLOB SERPL: 1.9 {RATIO} (ref 1–2)
ALP LIVER SERPL-CCNC: 102 U/L
ALT SERPL-CCNC: 15 U/L
ANION GAP SERPL CALC-SCNC: 4 MMOL/L (ref 0–18)
AST SERPL-CCNC: 21 U/L (ref ?–34)
B-HCG UR QL: NEGATIVE
BASOPHILS # BLD AUTO: 0.02 X10(3) UL (ref 0–0.2)
BASOPHILS NFR BLD AUTO: 0.5 %
BILIRUB SERPL-MCNC: 0.4 MG/DL (ref 0.3–1.2)
BILIRUB UR QL CFM: NEGATIVE
BUN BLD-MCNC: 6 MG/DL (ref 9–23)
CALCIUM BLD-MCNC: 8.9 MG/DL (ref 8.7–10.6)
CHLORIDE SERPL-SCNC: 105 MMOL/L (ref 98–112)
CO2 SERPL-SCNC: 32 MMOL/L (ref 21–32)
CREAT BLD-MCNC: 0.46 MG/DL
EGFRCR SERPLBLD CKD-EPI 2021: 135 ML/MIN/1.73M2 (ref 60–?)
EOSINOPHIL # BLD AUTO: 0.11 X10(3) UL (ref 0–0.7)
EOSINOPHIL NFR BLD AUTO: 2.7 %
ERYTHROCYTE [DISTWIDTH] IN BLOOD BY AUTOMATED COUNT: 12.7 %
GLOBULIN PLAS-MCNC: 2.2 G/DL (ref 2–3.5)
GLUCOSE BLD-MCNC: 90 MG/DL (ref 70–99)
GLUCOSE UR STRIP.AUTO-MCNC: NORMAL MG/DL
HCT VFR BLD AUTO: 38.8 %
HGB BLD-MCNC: 13.1 G/DL
IMM GRANULOCYTES # BLD AUTO: 0 X10(3) UL (ref 0–1)
IMM GRANULOCYTES NFR BLD: 0 %
KETONES UR STRIP.AUTO-MCNC: NEGATIVE MG/DL
LEUKOCYTE ESTERASE UR QL STRIP.AUTO: 500
LYMPHOCYTES # BLD AUTO: 1.42 X10(3) UL (ref 1–4)
LYMPHOCYTES NFR BLD AUTO: 35.3 %
MCH RBC QN AUTO: 30.8 PG (ref 26–34)
MCHC RBC AUTO-ENTMCNC: 33.8 G/DL (ref 31–37)
MCV RBC AUTO: 91.3 FL
MONOCYTES # BLD AUTO: 0.41 X10(3) UL (ref 0.1–1)
MONOCYTES NFR BLD AUTO: 10.2 %
NEUTROPHILS # BLD AUTO: 2.06 X10 (3) UL (ref 1.5–7.7)
NEUTROPHILS # BLD AUTO: 2.06 X10(3) UL (ref 1.5–7.7)
NEUTROPHILS NFR BLD AUTO: 51.3 %
OSMOLALITY SERPL CALC.SUM OF ELEC: 289 MOSM/KG (ref 275–295)
PH UR STRIP.AUTO: 7 [PH] (ref 5–8)
PLATELET # BLD AUTO: 153 10(3)UL (ref 150–450)
POTASSIUM SERPL-SCNC: 4.4 MMOL/L (ref 3.5–5.1)
PROT SERPL-MCNC: 6.3 G/DL (ref 5.7–8.2)
RBC # BLD AUTO: 4.25 X10(6)UL
SODIUM SERPL-SCNC: 141 MMOL/L (ref 136–145)
SP GR UR STRIP.AUTO: 1.02 (ref 1–1.03)
UROBILINOGEN UR STRIP.AUTO-MCNC: 4 MG/DL
WBC # BLD AUTO: 4 X10(3) UL (ref 4–11)

## 2025-03-17 PROCEDURE — 99285 EMERGENCY DEPT VISIT HI MDM: CPT

## 2025-03-17 PROCEDURE — 87086 URINE CULTURE/COLONY COUNT: CPT | Performed by: EMERGENCY MEDICINE

## 2025-03-17 PROCEDURE — 81025 URINE PREGNANCY TEST: CPT

## 2025-03-17 PROCEDURE — 85025 COMPLETE CBC W/AUTO DIFF WBC: CPT | Performed by: EMERGENCY MEDICINE

## 2025-03-17 PROCEDURE — 81001 URINALYSIS AUTO W/SCOPE: CPT | Performed by: EMERGENCY MEDICINE

## 2025-03-17 PROCEDURE — 51798 US URINE CAPACITY MEASURE: CPT

## 2025-03-17 PROCEDURE — 74176 CT ABD & PELVIS W/O CONTRAST: CPT | Performed by: EMERGENCY MEDICINE

## 2025-03-17 PROCEDURE — 96365 THER/PROPH/DIAG IV INF INIT: CPT

## 2025-03-17 PROCEDURE — 80053 COMPREHEN METABOLIC PANEL: CPT | Performed by: EMERGENCY MEDICINE

## 2025-03-17 RX ORDER — NITROFURANTOIN 25; 75 MG/1; MG/1
100 CAPSULE ORAL 2 TIMES DAILY
COMMUNITY

## 2025-03-17 RX ORDER — CEFPODOXIME PROXETIL 200 MG/1
400 TABLET, FILM COATED ORAL 2 TIMES DAILY
Qty: 28 TABLET | Refills: 0 | Status: SHIPPED | OUTPATIENT
Start: 2025-03-17 | End: 2025-03-24

## 2025-03-17 RX ORDER — CEPHALEXIN 500 MG/1
500 CAPSULE ORAL ONCE
Status: DISCONTINUED | OUTPATIENT
Start: 2025-03-17 | End: 2025-03-17
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
## 2025-03-17 NOTE — ED INITIAL ASSESSMENT (HPI)
Pt presents to ED with c/o pain and burning with urination since Thursday. Pt reports urinary frequency. Pt states she does not feel like she is retaining urine. Pt also states she has had some abdominal discomfort since Thursday, unsure if related to bladder issues. Pt awake and alert, skin w/d,resps reg/unlabored.

## 2025-03-17 NOTE — ED QUICK NOTES
Pt voided approx 100 ml ash colored urine. Pt does report she has been taking azo at home.    Pt with bladder scan post void residual of 82 ml.

## 2025-03-17 NOTE — ED QUICK NOTES
Rounding Completed    Plan of Care reviewed. Waiting for lab results/CT.  Elimination needs assessed.    Bed is locked and in lowest position. Call light within reach.

## 2025-03-17 NOTE — ED QUICK NOTES
Pt awake and alert, skin w/d,resps reg/unlabored. Pt denies complaints at this time. Pt aware awaiting CT.

## 2025-06-19 ENCOUNTER — PATIENT MESSAGE (OUTPATIENT)
Age: 25
End: 2025-06-19

## (undated) DEVICE — PERCUTANEOUS ENTRY THINWALL NEEDLE  ONE-PART: Brand: COOK

## (undated) DEVICE — PACK ANGIOGRAPHY CUSTOM

## (undated) DEVICE — GUIDEWIRE WITH ICE™ HYDROPHILIC COATING: Brand: V-18™ CONTROL WIRE™

## (undated) DEVICE — RADIFOCUS GLIDEWIRE ADVANTAGE GUIDEWIRE: Brand: GLIDEWIRE ADVANTAGE

## (undated) DEVICE — SUT PROL 6-0 24IN C-1 NABSRB BLU 13MM 3/8 CIR

## (undated) DEVICE — PINNACLE INTRODUCER SHEATH: Brand: PINNACLE

## (undated) DEVICE — KIT HEMSTAT MTRX 8ML PORCINE GEL HUM THROM

## (undated) DEVICE — SUT MCRYL 4-0 18IN PS-2 ABSRB UD 19MM 3/8 CIR

## (undated) DEVICE — 3M™ BAIR HUGGER® UNDERBODY BLANKET, FULL ACCESS, 10 PER CASE 63500: Brand: BAIR HUGGER™

## (undated) DEVICE — SUT PROL 5-0 36IN C-1 NABSRB BLU 13MM 3/8 CIR

## (undated) DEVICE — SUT VICRYL 2-0 UR-6 J602H

## (undated) DEVICE — Device

## (undated) DEVICE — GUIDEWIRE: Brand: AMPLATZ SUPER STIFF™

## (undated) DEVICE — RADIFOCUS GLIDEWIRE: Brand: GLIDEWIRE

## (undated) DEVICE — GYN CDS: Brand: MEDLINE INDUSTRIES, INC.

## (undated) DEVICE — Device: Brand: VASCULAR DILATOR KIT

## (undated) DEVICE — TRAY CATH 16FR F INCL BARDX IC COMPLT CARE

## (undated) DEVICE — TUBING SUCTION COLLECTION SET

## (undated) DEVICE — SUT PERMA- 3-0 30IN NABSRB BLK TIE SILK

## (undated) DEVICE — CLOTTRIEVER SHEATH, 13 FR, 15 CM: Brand: CLOTTRIEVER SHEATH, 13 FR

## (undated) DEVICE — KENDALL SCD EXPRESS SLEEVES, KNEE LENGTH, MEDIUM: Brand: KENDALL SCD

## (undated) DEVICE — SLEEVE KENDALL SCD EXPRESS MED

## (undated) DEVICE — ADHESIVE SKIN TOP FOR WND CLSR DERMBND ADV

## (undated) DEVICE — KIT INFL DEV 20ML W/ INSRT TOOL 3 W STPCOCK

## (undated) DEVICE — INTENDED TO BE USED TO OCCLUDE, RETRACT AND IDENTIFY ARTERIES, VEINS, TENDONS AND NERVES IN SURGICAL PROCEDURES: Brand: STERION®  VESSEL LOOP

## (undated) DEVICE — CLOVERSNARE 4-LOOP VASCULAR RETRIEVER: Brand: CLOVERSNARE

## (undated) DEVICE — NAVICROSS SUPPORT CATHETER: Brand: NAVICROSS

## (undated) DEVICE — 3M™ TEGADERM™ TRANSPARENT FILM DRESSING FRAME STYLE, 1626W, 4 IN X 4-3/4 IN (10 CM X 12 CM), 50/CT 4CT/CASE: Brand: 3M™ TEGADERM™

## (undated) DEVICE — PERIPHERAL IMAGING CATHETER: Brand: OPTICROSS 35

## (undated) DEVICE — FLOWSTASIS SINGLE-PACK: Brand: FLOWSTASIS SINGLE-PACK

## (undated) DEVICE — FIXED CORE WIRE GUIDE SAFE-T-J, CURVED: Brand: COOK

## (undated) DEVICE — PROTRIEVE™ SHEATH (SHEATH): Brand: PROTRIEVE SHEATH

## (undated) DEVICE — SYSTEM TRNSF 39X81IN MOB AIR / MAT AND M2

## (undated) DEVICE — CURRETTE 7MM CVD

## (undated) DEVICE — ATLAS®  PTA BALLOON DILATATION CATHETER 12 MM X 60 MM, 75 CM CATHETER: Brand: ATLAS®

## (undated) DEVICE — CLOTTRIEVER BOLD CATHETER (CLOTTRIEVER THROMBECTOMY SYSTEM): Brand: CLOTTRIEVER BOLD CATHETER

## (undated) DEVICE — STERILE POLYISOPRENE POWDER-FREE SURGICAL GLOVES: Brand: PROTEXIS

## (undated) DEVICE — FLOWSTASIS SUTURE RETENTION DEVICE: Brand: FLOWSTASIS

## (undated) DEVICE — SUT PERMA- 3-0 18IN SH NABSRB BLK CR 26MM

## (undated) DEVICE — ROSEN CURVED WIRE GUIDE: Brand: ROSEN

## (undated) DEVICE — CLOTTRIEVER SHEATH, 16 FR, 15 CM LENGTH: Brand: CLOTTRIEVER SHEATH,  16 FR

## (undated) DEVICE — SOLUTION IV 500ML 0.9% NACL FLX CONT

## (undated) DEVICE — STANDARD HYPODERMIC NEEDLE,POLYPROPYLENE HUB: Brand: MONOJECT

## (undated) DEVICE — CATHETER ANGIO 5FR L65CM GWIRE 0.038IN KMP

## (undated) DEVICE — SUT ETHBND XL 2 30IN V-37 NABSRB GRN 40MM 1/2

## (undated) DEVICE — Device: Brand: INTELLICART™

## (undated) DEVICE — GLIDESHEATH SLENDER STAINLESS STEEL KIT: Brand: GLIDESHEATH SLENDER

## (undated) DEVICE — PACK CV CUSTOM

## (undated) DEVICE — 3M™ IOBAN™ 2 ANTIMICROBIAL INCISE DRAPE 6651EZ: Brand: IOBAN™ 2

## (undated) DEVICE — LARGE, DISPOSABLE ALEXIS O C-SECTION PROTECTOR - RETRACTOR: Brand: ALEXIS ® O C-SECTION PROTECTOR - RETRACTOR

## (undated) DEVICE — SOL NACL IRRIG 0.9% 1000ML BTL

## (undated) DEVICE — CANISTER SAFETOUCH SYST DISP

## (undated) DEVICE — SUT PROL 6-0 30IN C-1 NABSRB BLU 13MM 3/8 CIR

## (undated) DEVICE — SOLUTION IRRIG 1000ML 0.9% NACL USP BTL

## (undated) DEVICE — CLOTTRIEVER CATHETER, 16 MM, 115 CM, VARIABLE: Brand: CLOTTRIEVER CATHETER, VARIABLE LENGTH

## (undated) DEVICE — KIT NEG PRSS PREVENA DRAIN 20CM INCIS MGMT PEEL PALACE

## (undated) DEVICE — TRAXI RETRACTOR

## (undated) DEVICE — SUT COAT VCRL 3-0 18IN SH ABSRB UD CR 26MM

## (undated) NOTE — LETTER
OUTSIDE TESTING RESULT REQUEST     IMPORTANT: FOR YOUR IMMEDIATE ATTENTION  Please FAX all test results listed below to: [phone]     Testing already done on or about: 22     * * * * If testing is NOT complete, arrange with patient A.S.A.P. * * * *      Patient Name: Anne Marie Naranjo  Surgery Date: 2023  CSN: [id number]  Medical Record: [id number]   : 2000 - A: 23 y      Sex: female  Surgeon(s):  Daniela Unger MD  Procedure: SUCTION DILATION AND CURETTAGE  Anesthesia Type: MAC     Surgeon: Daniela Unger MD     The following Testing and Time Line are REQUIRED PER ANESTHESIA     CBC, Platelet; NO Differential within  30 days      Thank You,   Sent by: Dax Duran RN

## (undated) NOTE — LETTER
Date & Time: 4/29/2023, 8:05 PM  Patient: Sue Holland  Encounter Provider(s):    Filemon Mehta MD       To Whom It May Concern:    Justin Brown was seen and treated in our department on 4/29/2023. She can return to work.     If you have any questions or concerns, please do not hesitate to call.        _____________________________  Physician/APC Signature

## (undated) NOTE — LETTER
OUTSIDE TESTING RESULT REQUEST     IMPORTANT: FOR YOUR IMMEDIATE ATTENTION  Please FAX all test results listed below to: [phone]     Testing already done on or about: ***     * * * * If testing is NOT complete, arrange with patient A.S.A.P. * * * *      Patient Name: Lavetta Bumpers  Surgery Date: 2023  CSN: [id number]  Medical Record: [id number]   : 2000 - A: 23 y      Sex: female  Surgeon(s):  Raissa Duvall MD  Procedure: SUCTION DILATION AND CURETTAGE  Anesthesia Type: MAC     Surgeon: Raissa Duvall MD     The following Testing and Time Line are REQUIRED PER ANESTHESIA     {EDW [facility] [id number]}      Thank You,   Sent by:***

## (undated) NOTE — LETTER
[facility]  [address] [address]  [phone]    Blood Transfusion Consent    In the course of your treatment, it may become necessary to administer a transfusion of blood or blood components. This form provides basic information concerning this procedure and, if signed by you, authorizes its administration. By signing this form, you agree that all of your questions about the administration of blood or blood products have been answered by the ordering medical professional or designee.    Description of Procedure  Blood is introduced into one of your veins, commonly in the arm, using a sterilized disposable needle. The amount of blood transfused, and whether the transfusion will be of blood or blood components is a judgement the physician will make based on your particular needs.    Risks  The transfusion is a common procedure of low risk.  MINOR AND TEMPORARY REACTIONS ARE NOT UNCOMMON, including a slight bruise, swelling or local reaction in the area where the needle pierces your skin, or a nonserious reaction to the transfused material itself, including headache, fever or mild skin reaction, such as rash.  Serious reactions are possible, though very unlikely, and include severe allergic reaction (shock) and destruction (hemolysis) of transfused blood cells.  Infectious diseases which are known to be transmitted by blood transfusion include certain types of viral Hepatitis(liver infection from a virus), Human Immunodeficiency Virus (HIV-1,2) infection, a viral infection known to cause Acquired Immunodeficiency Syndrome (AIDS), as well as certain other bacterial, viral, and parasitic diseases. While a minimal risk of acquiring an infectious disease from transfused blood exists, in accordance with the Federal and State law, all due care has been taken in donor selection and testing to avoid transmission of disease.    Alternatives  If loss of blood poses serious threats during your  treatment, THERE IS NO EFFECTIVE ALTERNATIVE TO BLOOD TRANSFUSION. However, if you have any further questions on this matter, your provider will fully explain the alternatives to you if it has not already been done.    I, ______________________________, have read/had read to me the above. I understand the matters bearing on the decision whether or not to authorize a transfusion of blood or blood components. I have no questions which have not been answered to my full satisfaction. I hereby consent to such transfusion as my physician may deem necessary or advisable in the course of my treatment.    ______________________________________________                    ___________________________  (Signature of Patient or Responsible party in case of minor,                 (Printed Name of Patient or incompetent, or unconscious patient)              Responsible Party)    ___________________________               _____________________  (Relationship to Patient if not self)                                    (Date and Time)    __________________________                                                           ______________________              (Signature of Witness)               (Printed Name of Witness)     Language line ()    Telephone/Verbal/Video Consent    __________________________                     ____________________  (Signature of 2nd Witness           (Printed Name of 2nd  Telephone/Verbal/Video Consent)           Witness)    Patient Name: Carmela Bell     : 2000                 Printed: 2024     Medical Record #: [id number]      Rev: 2023

## (undated) NOTE — ED AVS SNAPSHOT
Hodan Travis   MRN: [id number]    Department:  [facility] Emergency Department in [place]   Date of Visit:  8/3/2017           Disclosure     Insurance plans vary and the physician(s) referred by the ER may not be covered by your plan.  Please cont If you have been prescribed any medication(s), please fill your prescription right away and begin taking the medication(s) as directed    If the emergency physician has read X-rays, these will be re-interpreted by a radiologist.  If there is a significant

## (undated) NOTE — LETTER
[facility]  Judy Lew 59 [address] [facility], [address] [place] [address]    Consent for Operation    Date: __________________    Time: _______________    1.  I authorize the performance upon Jose Roads the following operation:                              Prim videotape. The [facility] will not be responsible for storage or maintenance of this tape. 6. For the purpose of advancing medical education, I consent to the admittance of observers to the Operating Room.     7. I authorize the use of any specimen, organs Signature of Patient:   ___________________________    When the patient is a minor or mentally incompetent to give consent:  Signature of person authorized to consent for patient: ___________________________   Relationship to patient: _____________________ · If I am allergic to anything or have had a reaction to anesthesia before. 3. I understand how the anesthesia medicine will help me (benefits). 4. I understand that with any type of anesthesia medicine there are risks:  a.  The most common risks are: their representative has agreed to have anesthesia services.     _____________________________________________________________________________  Witness        Date   Time  I have verified that the signature is that of the patient or patient’s representative

## (undated) NOTE — ED AVS SNAPSHOT
Sarah Garcia   MRN: [id number]    Department:  [facility] Emergency Department   Date of Visit:  8/15/2017           Disclosure     Insurance plans vary and the physician(s) referred by the ER may not be covered by your plan.  Please contact If you have been prescribed any medication(s), please fill your prescription right away and begin taking the medication(s) as directed    If the emergency physician has read X-rays, these will be re-interpreted by a radiologist.  If there is a significant

## (undated) NOTE — LETTER
12/20/24    Edilma Bell      To Whom It May Concern:    Ms. Bell's daughter Carmela had surgery at this hospital on 12/18/24 and has needed continuous care and assistance. Ms. Bell has been caring for her during this time and moving forward. Please excuse her work absence.      Sincerely,      Meseret Valentino MD  12/20/24, 1:15 PM

## (undated) NOTE — ED AVS SNAPSHOT
Deven Zamora   MRN: [id number]    Department:  [facility] Emergency Department   Date of Visit:  3/6/2018           Disclosure     Insurance plans vary and the physician(s) referred by the ER may not be covered by your plan.  Please contact tell this physician (or your personal doctor if your instructions are to return to your personal doctor) about any new or lasting problems. The primary care or specialist physician will see patients referred from the [facility] Emergency Department.  Jack Cowan

## (undated) NOTE — Clinical Note
[address]  [address]  Authorization for Surgical Operation and Procedure     Date:___________                                                                                                         Time:__________  1. I hereby authorize Surgeon(s):  Jamaal Koroma MD, my physician and his/her assistants (if applicable), which may include medical students, residents, and/or fellows, to perform the following surgical operation/ procedure and administer such anesthesia as may be determined necessary by my physician:  Operation/Procedure name (s) Procedure(s):  PERCUTANEOUS THROMBECTOMY OF INFERIOR VENA CAVA, BILATERAL ILIOFEMORAL VEINS, BILATERAL POPLITEAL VEINS WITH RIGHT INTERNAL JUGULAR ACCESS, BILATERAL SUPERFICIAL FEMORAL VEIN ACCESS, POST TIBIAL VEIN ACCESS, CELL SAVER  ANGIOGRAM ADD-ON on Carmela Bell   2.   I recognize that during the surgical operation/procedure, unforeseen conditions may necessitate additional or different procedures than those listed above.  I, therefore, further authorize and request that the above-named surgeon, assistants, or designees perform such procedures as are, in their judgment, necessary and desirable.    3.   My surgeon/physician has discussed prior to my surgery the potential benefits, risks and side effects of this procedure; the likelihood of achieving goals; and potential problems that might occur during recuperation.  They also discussed reasonable alternatives to the procedure, including risks, benefits, and side effects related to the alternatives and risks related to not receiving this procedure.  I have had all my questions answered and I acknowledge that no guarantee has been made as to the result that may be obtained.    4.   Should the need arise during my operation/procedure, which includes change of level of care prior to discharge, I also consent to the administration of blood and/or blood products.  Further, I  understand that despite careful testing and screening of blood or blood products by collecting agencies, I may still be subject to ill effects as a result of receiving a blood transfusion and/or blood products.  The following are some, but not all, of the potential risks that can occur: fever and allergic reactions, hemolytic reactions, transmission of diseases such as Hepatitis, AIDS and Cytomegalovirus (CMV) and fluid overload.  In the event that I wish to have an autologous transfusion of my own blood, or a directed donor transfusion, I will discuss this with my physician.  Check only if Refusing Blood or Blood Products  I understand refusal of blood or blood products as deemed necessary by my physician may have serious consequences to my condition to include possible death. I hereby assume responsibility for my refusal and release the hospital, its personnel, and my physicians from any responsibility for the consequences of my refusal.          o  Refuse      5.   I authorize the use of any specimen, organs, tissues, body parts or foreign objects that may be removed from my body during the operation/procedure for diagnosis, research or teaching purposes and their subsequent disposal by hospital authorities.  I also authorize the release of specimen test results and/or written reports to my treating physician on the hospital medical staff or other referring or consulting physicians involved in my care, at the discretion of the Pathologist or my treating physician.    6.   I consent to the photographing or videotaping of the operations or procedures to be performed, including appropriate portions of my body for medical, scientific, or educational purposes, provided my identity is not revealed by the pictures or by descriptive texts accompanying them.  If the procedure has been photographed/videotaped, the surgeon will obtain the original picture, image, videotape or CD.  The hospital will not be responsible for  storage, release or maintenance of the picture, image, tape or CD.    7.   I consent to the presence of a  or observers in the operating room as deemed necessary by my physician or their designees.    8.   I recognize that in the event my procedure results in extended X-Ray/fluoroscopy time, I may develop a skin reaction.    9. If I have a Do Not Attempt Resuscitation (DNAR) order in place, that status will be suspended while in the operating room, procedural suite, and during the recovery period unless otherwise explicitly stated by me (or a person authorized to consent on my behalf). The surgeon or my attending physician will determine when the applicable recovery period ends for purposes of reinstating the DNAR order.  10. Patients having a sterilization procedure: I understand that if the procedure is successful the results will be permanent and it will therefore be impossible for me to inseminate, conceive, or bear children.  I also understand that the procedure is intended to result in sterility, although the result has not been guaranteed.   11. I acknowledge that my physician has explained sedation/analgesia administration to me including the risk and benefits I consent to the administration of sedation/analgesia as may be necessary or desirable in the judgment of my physician.    I CERTIFY THAT I HAVE READ AND FULLY UNDERSTAND THE ABOVE CONSENT TO OPERATION and/or OTHER PROCEDURE.    _________________________________________  __________________________________  Signature of Patient     Signature of Responsible Person         ___________________________________         Printed Name of Responsible Person           _________________________________                 Relationship to Patient  _________________________________________  ______________________________  Signature of Witness          Date  Time      Patient Name: Carmela MAYS Arabella     : 2000                 Printed:  December 19, 2024     Medical Record #: [id number]                     Page 1 of 2                                  [address]  [address]    Consent for Anesthesia    1. I, Carmela Bell agree to be cared for by an anesthesiologist, who is specially trained to monitor me and give me medicine to put me to sleep or keep me comfortable during my procedure    I understand that my anesthesiologist is not an employee or agent of [facility] or [employer]. He or she works for [employer].    2. As the patient asking for anesthesia services, I agree to:  a. Allow the anesthesiologist (anesthesia doctor) to give me medicine and do additional procedures as necessary. Some examples are: Starting or using an “IV” to give me medicine, fluids or blood during my procedure, and having a breathing tube placed to help me breathe when I’m asleep (intubation). In the event that my heart stops working properly, I understand that my anesthesiologist will make every effort to sustain my life, unless otherwise directed by [facility] Do Not Resuscitate documents.  b. Tell my anesthesia doctor before my procedure:  i. If I am pregnant.  ii. The last time that I ate or drank.  iii. All of the medicines I take (including prescriptions, herbal supplements, and pills I can buy without a prescription (including street drugs/illegal medications). Failure to inform my anesthesiologist about these medicines may increase my risk of anesthetic complications.  iv. If I am allergic to anything or have had a reaction to anesthesia before.  3. I understand how the anesthesia medicine will help me (benefits).  4. I understand that with any type of anesthesia medicine there are risks:  a. The most common risks are: nausea, vomiting, sore throat, muscle soreness, damage to my eyes, mouth, or teeth (from breathing tube placement).  b. Rare risks include: remembering what  happened during my procedure, allergic reactions to medications, injury to my airway, heart, lungs, vision, nerves, or muscles and in extremely rare instances death.  5. My doctor has explained to me other choices available to me for my care (alternatives).  6. Pregnant Patients (“epidural”):  I understand that the risks of having an epidural (medicine given into my back to help control pain during labor), include itching, low blood pressure, difficulty urinating, headache or slowing of the baby’s heart. Very rare risks include infection, bleeding, seizure, irregular heart rhythms and nerve injury.  7. Regional Anesthesia (“spinal”, “epidural”, & “nerve blocks”):  I understand that rare but potential complications include headache, bleeding, infection, seizure, irregular heart rhythms, and nerve injury.    I can change my mind about having anesthesia services at any time before I get the medicine.    _____________________________________________________________________________  Patient (or Representative) Signature/Relationship to Patient  Date   Time    _____________________________________________________________________________   Name (if used)    Language/Organization   Time    _____________________________________________________________________________  Anesthesiologist Signature     Date   Time  I have discussed the procedure and information above with the patient (or patient’s representative) and answered their questions. The patient or their representative has agreed to have anesthesia services.    _____________________________________________________________________________  Witness        Date   Time  I have verified that the signature is that of the patient or patient’s representative, and that it was signed before the procedure  Patient Name: Carmela MAYS Arabella     : 2000                 Printed: 2024     Medical Record #: [id number]                     Page 2 of 2

## (undated) NOTE — LETTER
Date & Time: 4/29/2023, 8:07 PM  Patient: Jamia Marlow  Encounter Provider(s):    Amna Guajardo MD       To Whom It May Concern:    Alex Flores was seen and treated in our department on 4/29/2023. She can return to work.     If you have any questions or concerns, please do not hesitate to call.        _____________________________  Physician/APC Signature

## (undated) NOTE — ED AVS SNAPSHOT
Sandee Moritz   MRN: [id number]    Department:  [facility] Emergency Department   Date of Visit:  9/12/2017           Disclosure     Insurance plans vary and the physician(s) referred by the ER may not be covered by your plan.  Please contac If you have been prescribed any medication(s), please fill your prescription right away and begin taking the medication(s) as directed    If the emergency physician has read X-rays, these will be re-interpreted by a radiologist.  If there is a significant

## (undated) NOTE — LETTER
[facility]  Judy Lew 59 [address] [facility], [address] [place] [address]    Consent for Operation    Date: __________________    Time: _______________    1.  I authorize the performance upon Yuval Collado the following operation:                              Prim procedure has been videotaped, the surgeon will obtain the original videotape. The hospital will not be responsible for storage or maintenance of this tape.     6. For the purpose of advancing medical education, I consent to the admittance of observers to t STATEMENTS REQUIRING INSERTION OR COMPLETION WERE FILLED IN.     Signature of Patient:   ___________________________    When the patient is a minor or mentally incompetent to give consent:  Signature of person authorized to consent for patient: ____________ these medicines may increase my risk of anesthetic complications. · If I am allergic to anything or have had a reaction to anesthesia before. 3. I understand how the anesthesia medicine will help me (benefits).     4. I understand that with any type of patient’s representative) and answered their questions. The patient or their representative has agreed to have anesthesia services.     _____________________________________________________________________________  Witness        Date   Time  I have alcira

## (undated) NOTE — Clinical Note
1.   Weekly NST at  36 wks    2.   monthly growth US 3.   check platelets monthly and at 36wks.   If < 100k at 36wks then consider treatment or if near 50k earlier in pregnancy

## (undated) NOTE — LETTER
[facility]  Joseleslee Lew 59 [address] [facility], [address] [place] [address]    Consent for Operation    Date: __________________    Time: _______________    1.  I authorize the performance upon Paula Linares the following operation:                              Prim videotape. [facility] will not be responsible for storage or maintenance of this tape. 6. For the purpose of advancing medical education, I consent to the admittance of observers to the Operating Room.     7. I authorize the use of any specimen, organs Signature of Patient:   ___________________________    When the patient is a minor or mentally incompetent to give consent:  Signature of person authorized to consent for patient: ___________________________   Relationship to patient: _____________________ · If I am allergic to anything or have had a reaction to anesthesia before. 3. I understand how the anesthesia medicine will help me (benefits). 4. I understand that with any type of anesthesia medicine there are risks:  a.  The most common risks are: their representative has agreed to have anesthesia services.     _____________________________________________________________________________  Witness        Date   Time  I have verified that the signature is that of the patient or patient’s representative

## (undated) NOTE — LETTER
Date & Time: 11/25/2022, 2:56 PM  Patient: Kimberlee Madera  Encounter Provider(s):    MD Ulises Sandoval APRN       To Whom It May Concern:    Belinda Steven was seen and treated in our department on 11/25/2022.  She should not work until fever free for 24 hrs    If you have any questions or concerns, please do not hesitate to call.        _____________________________  Physician/APC Signature

## (undated) NOTE — LETTER
[address]  [address]  Consent for Procedure/Sedation  Date: 11/1/24         Time: 1532    I hereby authorize Dr. Michael, my physician and his/her assistants (if applicable), which may include medical students, residents, and/or fellows, to perform the following surgical operation/ procedure and administer such anesthesia as may be determined necessary by my physician: Inferior vena cava filter placement on Carmela Bell  2.   I recognize that during the surgical operation/procedure, unforeseen conditions may necessitate additional or different procedures than those listed above.  I, therefore, further authorize and request that the above-named surgeon, assistants, or designees perform such procedures as are, in their judgment, necessary and desirable.    3.   My surgeon/physician has discussed prior to my surgery the potential benefits, risks and side effects of this procedure; the likelihood of achieving goals; and potential problems that might occur during recuperation.  They also discussed reasonable alternatives to the procedure, including risks, benefits, and side effects related to the alternatives and risks related to not receiving this procedure.  I have had all my questions answered and I acknowledge that no guarantee has been made as to the result that may be obtained.    4.   Should the need arise during my operation/procedure, which includes change of level of care prior to discharge, I also consent to the administration of blood and/or blood products.  Further, I understand that despite careful testing and screening of blood or blood products by collecting agencies, I may still be subject to ill effects as a result of receiving a blood transfusion and/or blood products.  The following are some, but not all, of the potential risks that can occur: fever and allergic reactions, hemolytic reactions, transmission of diseases such as Hepatitis, AIDS and  Cytomegalovirus (CMV) and fluid overload.  In the event that I wish to have an autologous transfusion of my own blood, or a directed donor transfusion, I will discuss this with my physician.   Check only if Refusing Blood or Blood Products  I understand refusal of blood or blood products as deemed necessary by my physician may have serious consequences to my condition to include possible death. I hereby assume responsibility for my refusal and release the hospital, its personnel, and my physicians from any responsibility for the consequences of my refusal.         o  Refuse         5.   I authorize the use of any specimen, organs, tissues, body parts or foreign objects that may be removed from my body during the operation/procedure for diagnosis, research or teaching purposes and their subsequent disposal by hospital authorities.  I also authorize the release of specimen test results and/or written reports to my treating physician on the hospital medical staff or other referring or consulting physicians involved in my care, at the discretion of the Pathologist or my treating physician.    6.   I consent to the photographing or videotaping of the operations or procedures to be performed, including appropriate portions of my body for medical, scientific, or educational purposes, provided my identity is not revealed by the pictures or by descriptive texts accompanying them.  If the procedure has been photographed/videotaped, the surgeon will obtain the original picture, image, videotape or CD.  The hospital will not be responsible for storage, release or maintenance of the picture, image, tape or CD.    7.   I consent to the presence of a  or observers in the operating room as deemed necessary by my physician or their designees.    8.   I recognize that in the event my procedure results in extended X-Ray/fluoroscopy time, I may develop a skin reaction.    9. If I have a Do Not Attempt Resuscitation  (DNAR) order in place, that status will be suspended while in the operating room, procedural suite, and during the recovery period unless otherwise explicitly stated by me (or a person authorized to consent on my behalf). The surgeon or my attending physician will determine when the applicable recovery period ends for purposes of reinstating the DNAR order.  10. Patients having a sterilization procedure: I understand that if the procedure is successful the results will be permanent and it will therefore be impossible for me to inseminate, conceive, or bear children.  I also understand that the procedure is intended to result in sterility, although the result has not been guaranteed.   11. I acknowledge that my physician has explained sedation/analgesia administration to me including the risk and benefits I consent to the administration of sedation/analgesia as may be necessary or desirable in the judgment of my physician.    I CERTIFY THAT I HAVE READ AND FULLY UNDERSTAND THE ABOVE CONSENT TO OPERATION and/or OTHER PROCEDURE.        ____________________________________       _________________________________      ______________________________  Signature of Patient         Signature of Responsible Person        Printed Name of Responsible Person        ____________________________________      _________________________________      ______________________________       Signature of Witness          Relationship to Patient                       Date                                       Time  Patient Name: Carmela MAYS Riannafay  : 2000    Reviewed: 2024   Printed: 2024  Medical Record #: [id number] Page 1 of 1

## (undated) NOTE — LETTER
Date & Time: 11/27/2018, 10:07 PM  Patient: Consuelo Garcia  Encounter Provider(s):    Sue Bowman MD       To Whom It May Concern:    Nikhil Muse was seen and treated in our department on 11/27/2018.  Patient must not submerge left foot in

## (undated) NOTE — ED AVS SNAPSHOT
Naheed Figueroa   MRN: [id number]    Department:  [facility] Emergency Department in [place]   Date of Visit:  11/27/2018           Disclosure     Insurance plans vary and the physician(s) referred by the ER may not be covered by your plan.  Please tell this physician (or your personal doctor if your instructions are to return to your personal doctor) about any new or lasting problems. The primary care or specialist physician will see patients referred from the [facility] Emergency Department.  Morales Clark